# Patient Record
Sex: MALE | Race: WHITE | NOT HISPANIC OR LATINO
[De-identification: names, ages, dates, MRNs, and addresses within clinical notes are randomized per-mention and may not be internally consistent; named-entity substitution may affect disease eponyms.]

---

## 2017-02-11 ENCOUNTER — RX RENEWAL (OUTPATIENT)
Age: 67
End: 2017-02-11

## 2017-12-20 ENCOUNTER — APPOINTMENT (OUTPATIENT)
Dept: UROLOGY | Facility: CLINIC | Age: 67
End: 2017-12-20
Payer: COMMERCIAL

## 2017-12-20 VITALS
HEIGHT: 69 IN | HEART RATE: 75 BPM | TEMPERATURE: 98.4 F | DIASTOLIC BLOOD PRESSURE: 76 MMHG | SYSTOLIC BLOOD PRESSURE: 138 MMHG | OXYGEN SATURATION: 98 % | WEIGHT: 210 LBS | BODY MASS INDEX: 31.1 KG/M2

## 2017-12-20 PROCEDURE — 99213 OFFICE O/P EST LOW 20 MIN: CPT

## 2018-02-23 LAB
APPEARANCE: CLEAR
BACTERIA: NEGATIVE
BILIRUBIN URINE: NEGATIVE
BLOOD URINE: ABNORMAL
COLOR: YELLOW
CORE LAB FLUID CYTOLOGY: NORMAL
GLUCOSE QUALITATIVE U: NEGATIVE MG/DL
HYALINE CASTS: 1 /LPF
KETONES URINE: NEGATIVE
LEUKOCYTE ESTERASE URINE: NEGATIVE
MICROSCOPIC-UA: NORMAL
NITRITE URINE: NEGATIVE
PH URINE: 5.5
PROTEIN URINE: NEGATIVE MG/DL
PSA SERPL-MCNC: 0.35 NG/ML
RED BLOOD CELLS URINE: 2 /HPF
SPECIFIC GRAVITY URINE: 1.02
SQUAMOUS EPITHELIAL CELLS: 1 /HPF
UROBILINOGEN URINE: NEGATIVE MG/DL
WHITE BLOOD CELLS URINE: 0 /HPF

## 2018-06-27 ENCOUNTER — TRANSCRIPTION ENCOUNTER (OUTPATIENT)
Age: 68
End: 2018-06-27

## 2018-06-27 NOTE — ASU PATIENT PROFILE, ADULT - PMH
HTN (hypertension)    Hyperlipemia    Nephrolithiasis    OA (osteoarthritis)    CHEL on CPAP HTN (hypertension)    Hyperlipemia    Morbid obesity    Nephrolithiasis    OA (osteoarthritis)    CHEL on CPAP

## 2018-06-28 ENCOUNTER — OUTPATIENT (OUTPATIENT)
Dept: OUTPATIENT SERVICES | Facility: HOSPITAL | Age: 68
LOS: 1 days | End: 2018-06-28
Payer: COMMERCIAL

## 2018-06-28 ENCOUNTER — RESULT REVIEW (OUTPATIENT)
Age: 68
End: 2018-06-28

## 2018-06-28 VITALS
HEART RATE: 61 BPM | OXYGEN SATURATION: 94 % | DIASTOLIC BLOOD PRESSURE: 61 MMHG | SYSTOLIC BLOOD PRESSURE: 104 MMHG | RESPIRATION RATE: 12 BRPM

## 2018-06-28 VITALS
HEART RATE: 67 BPM | OXYGEN SATURATION: 96 % | SYSTOLIC BLOOD PRESSURE: 133 MMHG | RESPIRATION RATE: 12 BRPM | WEIGHT: 252.65 LBS | TEMPERATURE: 98 F | HEIGHT: 68 IN | DIASTOLIC BLOOD PRESSURE: 75 MMHG

## 2018-06-28 DIAGNOSIS — D48.7 NEOPLASM OF UNCERTAIN BEHAVIOR OF OTHER SPECIFIED SITES: ICD-10-CM

## 2018-06-28 DIAGNOSIS — Z98.89 OTHER SPECIFIED POSTPROCEDURAL STATES: Chronic | ICD-10-CM

## 2018-06-28 DIAGNOSIS — Z96.659 PRESENCE OF UNSPECIFIED ARTIFICIAL KNEE JOINT: Chronic | ICD-10-CM

## 2018-06-28 PROCEDURE — 88305 TISSUE EXAM BY PATHOLOGIST: CPT | Mod: 26

## 2018-06-28 PROCEDURE — 88305 TISSUE EXAM BY PATHOLOGIST: CPT

## 2018-06-28 PROCEDURE — 68115 EXC LES CONJUNCTIVA >1 CM: CPT | Mod: 50

## 2018-06-28 NOTE — ASU DISCHARGE PLAN (ADULT/PEDIATRIC). - SPECIAL INSTRUCTIONS
tobradex ophthalmic suspension one drop both eyes four times daily  keflex 500 mg po tid for 5 days  iced sterile compresses to lids while awake  elevate head of bed 20 degrees  report active bleeding  swelling or eye pain

## 2018-09-28 ENCOUNTER — APPOINTMENT (OUTPATIENT)
Dept: UROLOGY | Facility: CLINIC | Age: 68
End: 2018-09-28
Payer: COMMERCIAL

## 2018-09-28 VITALS
TEMPERATURE: 98.9 F | SYSTOLIC BLOOD PRESSURE: 131 MMHG | DIASTOLIC BLOOD PRESSURE: 68 MMHG | HEIGHT: 69 IN | RESPIRATION RATE: 16 BRPM | BODY MASS INDEX: 35.55 KG/M2 | WEIGHT: 240 LBS | OXYGEN SATURATION: 97 % | HEART RATE: 78 BPM

## 2018-09-28 DIAGNOSIS — N32.89 OTHER SPECIFIED DISORDERS OF BLADDER: ICD-10-CM

## 2018-09-28 PROCEDURE — 99213 OFFICE O/P EST LOW 20 MIN: CPT

## 2018-10-09 RX ORDER — VARDENAFIL HYDROCHLORIDE 20 MG/1
20 TABLET, FILM COATED ORAL
Qty: 6 | Refills: 11 | Status: DISCONTINUED | COMMUNITY
Start: 2017-02-11 | End: 2018-10-09

## 2018-10-17 ENCOUNTER — FORM ENCOUNTER (OUTPATIENT)
Age: 68
End: 2018-10-17

## 2018-10-18 ENCOUNTER — OUTPATIENT (OUTPATIENT)
Dept: OUTPATIENT SERVICES | Facility: HOSPITAL | Age: 68
LOS: 1 days | End: 2018-10-18
Payer: COMMERCIAL

## 2018-10-18 ENCOUNTER — APPOINTMENT (OUTPATIENT)
Dept: CT IMAGING | Facility: CLINIC | Age: 68
End: 2018-10-18
Payer: COMMERCIAL

## 2018-10-18 DIAGNOSIS — Z00.8 ENCOUNTER FOR OTHER GENERAL EXAMINATION: ICD-10-CM

## 2018-10-18 DIAGNOSIS — Z96.659 PRESENCE OF UNSPECIFIED ARTIFICIAL KNEE JOINT: Chronic | ICD-10-CM

## 2018-10-18 DIAGNOSIS — Z98.89 OTHER SPECIFIED POSTPROCEDURAL STATES: Chronic | ICD-10-CM

## 2018-10-18 PROCEDURE — 74178 CT ABD&PLV WO CNTR FLWD CNTR: CPT

## 2018-10-18 PROCEDURE — 82565 ASSAY OF CREATININE: CPT

## 2018-10-18 PROCEDURE — 74178 CT ABD&PLV WO CNTR FLWD CNTR: CPT | Mod: 26

## 2019-02-04 LAB
ANION GAP SERPL CALC-SCNC: 13 MMOL/L
APPEARANCE: CLEAR
BACTERIA: NEGATIVE
BILIRUBIN URINE: NEGATIVE
BLOOD URINE: NEGATIVE
BUN SERPL-MCNC: 23 MG/DL
CALCIUM SERPL-MCNC: 9.2 MG/DL
CHLORIDE SERPL-SCNC: 104 MMOL/L
CO2 SERPL-SCNC: 23 MMOL/L
COLOR: YELLOW
CREAT SERPL-MCNC: 0.94 MG/DL
GLUCOSE QUALITATIVE U: NEGATIVE MG/DL
KETONES URINE: NEGATIVE
LEUKOCYTE ESTERASE URINE: NEGATIVE
MICROSCOPIC-UA: NORMAL
NITRITE URINE: NEGATIVE
PH URINE: 5.5
POTASSIUM SERPL-SCNC: 4.2 MMOL/L
PROTEIN URINE: NEGATIVE MG/DL
PSA SERPL-MCNC: 0.32 NG/ML
RED BLOOD CELLS URINE: 2 /HPF
SODIUM SERPL-SCNC: 140 MMOL/L
SPECIFIC GRAVITY URINE: 1.03
SQUAMOUS EPITHELIAL CELLS: 0 /HPF
UROBILINOGEN URINE: NEGATIVE MG/DL
WHITE BLOOD CELLS URINE: 1 /HPF

## 2019-06-14 ENCOUNTER — MEDICATION RENEWAL (OUTPATIENT)
Age: 69
End: 2019-06-14

## 2019-09-15 ENCOUNTER — INPATIENT (INPATIENT)
Facility: HOSPITAL | Age: 69
LOS: 4 days | Discharge: HOME | DRG: 444 | End: 2019-09-20
Attending: HOSPITALIST | Admitting: HOSPITALIST
Payer: COMMERCIAL

## 2019-09-15 ENCOUNTER — EMERGENCY (EMERGENCY)
Facility: HOSPITAL | Age: 69
LOS: 0 days | Discharge: ANOTHER HOSPITAL | End: 2019-09-15
Attending: EMERGENCY MEDICINE
Payer: COMMERCIAL

## 2019-09-15 VITALS
OXYGEN SATURATION: 98 % | DIASTOLIC BLOOD PRESSURE: 61 MMHG | HEIGHT: 69 IN | RESPIRATION RATE: 18 BRPM | SYSTOLIC BLOOD PRESSURE: 122 MMHG | HEART RATE: 81 BPM | TEMPERATURE: 98 F | WEIGHT: 220.02 LBS

## 2019-09-15 VITALS
HEART RATE: 67 BPM | OXYGEN SATURATION: 98 % | TEMPERATURE: 99 F | RESPIRATION RATE: 18 BRPM | DIASTOLIC BLOOD PRESSURE: 53 MMHG | SYSTOLIC BLOOD PRESSURE: 118 MMHG

## 2019-09-15 VITALS
HEART RATE: 77 BPM | RESPIRATION RATE: 18 BRPM | WEIGHT: 220.02 LBS | OXYGEN SATURATION: 95 % | SYSTOLIC BLOOD PRESSURE: 137 MMHG | DIASTOLIC BLOOD PRESSURE: 80 MMHG | TEMPERATURE: 99 F | HEIGHT: 68 IN

## 2019-09-15 DIAGNOSIS — Z96.659 PRESENCE OF UNSPECIFIED ARTIFICIAL KNEE JOINT: Chronic | ICD-10-CM

## 2019-09-15 DIAGNOSIS — Z87.442 PERSONAL HISTORY OF URINARY CALCULI: ICD-10-CM

## 2019-09-15 DIAGNOSIS — I10 ESSENTIAL (PRIMARY) HYPERTENSION: ICD-10-CM

## 2019-09-15 DIAGNOSIS — K80.50 CALCULUS OF BILE DUCT WITHOUT CHOLANGITIS OR CHOLECYSTITIS WITHOUT OBSTRUCTION: ICD-10-CM

## 2019-09-15 DIAGNOSIS — Z79.899 OTHER LONG TERM (CURRENT) DRUG THERAPY: ICD-10-CM

## 2019-09-15 DIAGNOSIS — K80.20 CALCULUS OF GALLBLADDER WITHOUT CHOLECYSTITIS WITHOUT OBSTRUCTION: ICD-10-CM

## 2019-09-15 DIAGNOSIS — E78.5 HYPERLIPIDEMIA, UNSPECIFIED: ICD-10-CM

## 2019-09-15 DIAGNOSIS — E66.01 MORBID (SEVERE) OBESITY DUE TO EXCESS CALORIES: ICD-10-CM

## 2019-09-15 DIAGNOSIS — Z98.89 OTHER SPECIFIED POSTPROCEDURAL STATES: Chronic | ICD-10-CM

## 2019-09-15 DIAGNOSIS — R10.13 EPIGASTRIC PAIN: ICD-10-CM

## 2019-09-15 DIAGNOSIS — K80.51 CALCULUS OF BILE DUCT WITHOUT CHOLANGITIS OR CHOLECYSTITIS WITH OBSTRUCTION: ICD-10-CM

## 2019-09-15 LAB
ADD ON TEST-SPECIMEN IN LAB: SIGNIFICANT CHANGE UP
ALBUMIN SERPL ELPH-MCNC: 3.8 G/DL — SIGNIFICANT CHANGE UP (ref 3.3–5)
ALBUMIN SERPL ELPH-MCNC: 4 G/DL — SIGNIFICANT CHANGE UP (ref 3.3–5.2)
ALP SERPL-CCNC: 81 U/L — SIGNIFICANT CHANGE UP (ref 40–120)
ALP SERPL-CCNC: 85 U/L — SIGNIFICANT CHANGE UP (ref 40–120)
ALT FLD-CCNC: 37 U/L — SIGNIFICANT CHANGE UP (ref 12–78)
ALT FLD-CCNC: 79 U/L — HIGH
ANION GAP SERPL CALC-SCNC: 11 MMOL/L — SIGNIFICANT CHANGE UP (ref 5–17)
ANION GAP SERPL CALC-SCNC: 17 MMOL/L — SIGNIFICANT CHANGE UP (ref 5–17)
ANISOCYTOSIS BLD QL: SLIGHT — SIGNIFICANT CHANGE UP
APPEARANCE UR: CLEAR — SIGNIFICANT CHANGE UP
APTT BLD: 28.4 SEC — SIGNIFICANT CHANGE UP (ref 27.5–36.3)
APTT BLD: 29.3 SEC — SIGNIFICANT CHANGE UP (ref 27.5–36.3)
AST SERPL-CCNC: 124 U/L — HIGH
AST SERPL-CCNC: 27 U/L — SIGNIFICANT CHANGE UP (ref 15–37)
BASOPHILS # BLD AUTO: 0 K/UL — SIGNIFICANT CHANGE UP (ref 0–0.2)
BASOPHILS NFR BLD AUTO: 0 % — SIGNIFICANT CHANGE UP (ref 0–2)
BILIRUB SERPL-MCNC: 1.3 MG/DL — HIGH (ref 0.2–1.2)
BILIRUB SERPL-MCNC: 2.1 MG/DL — HIGH (ref 0.4–2)
BILIRUB UR-MCNC: NEGATIVE — SIGNIFICANT CHANGE UP
BLD GP AB SCN SERPL QL: SIGNIFICANT CHANGE UP
BUN SERPL-MCNC: 17 MG/DL — SIGNIFICANT CHANGE UP (ref 8–20)
BUN SERPL-MCNC: 18 MG/DL — SIGNIFICANT CHANGE UP (ref 7–23)
CALCIUM SERPL-MCNC: 8.8 MG/DL — SIGNIFICANT CHANGE UP (ref 8.6–10.2)
CALCIUM SERPL-MCNC: 8.9 MG/DL — SIGNIFICANT CHANGE UP (ref 8.5–10.1)
CHLORIDE SERPL-SCNC: 100 MMOL/L — SIGNIFICANT CHANGE UP (ref 98–107)
CHLORIDE SERPL-SCNC: 105 MMOL/L — SIGNIFICANT CHANGE UP (ref 96–108)
CO2 SERPL-SCNC: 23 MMOL/L — SIGNIFICANT CHANGE UP (ref 22–29)
CO2 SERPL-SCNC: 24 MMOL/L — SIGNIFICANT CHANGE UP (ref 22–31)
COLOR SPEC: YELLOW — SIGNIFICANT CHANGE UP
CREAT SERPL-MCNC: 0.9 MG/DL — SIGNIFICANT CHANGE UP (ref 0.5–1.3)
CREAT SERPL-MCNC: 1.11 MG/DL — SIGNIFICANT CHANGE UP (ref 0.5–1.3)
DIFF PNL FLD: ABNORMAL
EOSINOPHIL # BLD AUTO: 0 K/UL — SIGNIFICANT CHANGE UP (ref 0–0.5)
EOSINOPHIL NFR BLD AUTO: 0 % — SIGNIFICANT CHANGE UP (ref 0–6)
GIANT PLATELETS BLD QL SMEAR: PRESENT — SIGNIFICANT CHANGE UP
GLUCOSE SERPL-MCNC: 135 MG/DL — HIGH (ref 70–115)
GLUCOSE SERPL-MCNC: 171 MG/DL — HIGH (ref 70–99)
GLUCOSE UR QL: NEGATIVE MG/DL — SIGNIFICANT CHANGE UP
HCT VFR BLD CALC: 41.2 % — SIGNIFICANT CHANGE UP (ref 39–50)
HCT VFR BLD CALC: 42 % — SIGNIFICANT CHANGE UP (ref 39–50)
HGB BLD-MCNC: 13.8 G/DL — SIGNIFICANT CHANGE UP (ref 13–17)
HGB BLD-MCNC: 14.4 G/DL — SIGNIFICANT CHANGE UP (ref 13–17)
HYPOCHROMIA BLD QL: SLIGHT — SIGNIFICANT CHANGE UP
INR BLD: 0.92 RATIO — SIGNIFICANT CHANGE UP (ref 0.88–1.16)
INR BLD: 1.07 RATIO — SIGNIFICANT CHANGE UP (ref 0.88–1.16)
KETONES UR-MCNC: ABNORMAL
LEUKOCYTE ESTERASE UR-ACNC: NEGATIVE — SIGNIFICANT CHANGE UP
LIDOCAIN IGE QN: 236 U/L — HIGH (ref 22–51)
LYMPHOCYTES # BLD AUTO: 1.29 K/UL — SIGNIFICANT CHANGE UP (ref 1–3.3)
LYMPHOCYTES # BLD AUTO: 7.8 % — LOW (ref 13–44)
MACROCYTES BLD QL: SLIGHT — SIGNIFICANT CHANGE UP
MAGNESIUM SERPL-MCNC: 2.1 MG/DL — SIGNIFICANT CHANGE UP (ref 1.6–2.6)
MANUAL SMEAR VERIFICATION: SIGNIFICANT CHANGE UP
MCHC RBC-ENTMCNC: 30.9 PG — SIGNIFICANT CHANGE UP (ref 27–34)
MCHC RBC-ENTMCNC: 31.3 PG — SIGNIFICANT CHANGE UP (ref 27–34)
MCHC RBC-ENTMCNC: 33.5 GM/DL — SIGNIFICANT CHANGE UP (ref 32–36)
MCHC RBC-ENTMCNC: 34.3 GM/DL — SIGNIFICANT CHANGE UP (ref 32–36)
MCV RBC AUTO: 91.3 FL — SIGNIFICANT CHANGE UP (ref 80–100)
MCV RBC AUTO: 92.4 FL — SIGNIFICANT CHANGE UP (ref 80–100)
MONOCYTES # BLD AUTO: 1.44 K/UL — HIGH (ref 0–0.9)
MONOCYTES NFR BLD AUTO: 8.7 % — SIGNIFICANT CHANGE UP (ref 2–14)
NEUTROPHILS # BLD AUTO: 13.79 K/UL — HIGH (ref 1.8–7.4)
NEUTROPHILS NFR BLD AUTO: 78.3 % — HIGH (ref 43–77)
NEUTS BAND # BLD: 5.2 % — SIGNIFICANT CHANGE UP (ref 0–8)
NITRITE UR-MCNC: NEGATIVE — SIGNIFICANT CHANGE UP
PH UR: 5 — SIGNIFICANT CHANGE UP (ref 5–8)
PLAT MORPH BLD: NORMAL — SIGNIFICANT CHANGE UP
PLATELET # BLD AUTO: 197 K/UL — SIGNIFICANT CHANGE UP (ref 150–400)
PLATELET # BLD AUTO: 210 K/UL — SIGNIFICANT CHANGE UP (ref 150–400)
POIKILOCYTOSIS BLD QL AUTO: SLIGHT — SIGNIFICANT CHANGE UP
POTASSIUM SERPL-MCNC: 3.9 MMOL/L — SIGNIFICANT CHANGE UP (ref 3.5–5.3)
POTASSIUM SERPL-MCNC: 4.3 MMOL/L — SIGNIFICANT CHANGE UP (ref 3.5–5.3)
POTASSIUM SERPL-SCNC: 3.9 MMOL/L — SIGNIFICANT CHANGE UP (ref 3.5–5.3)
POTASSIUM SERPL-SCNC: 4.3 MMOL/L — SIGNIFICANT CHANGE UP (ref 3.5–5.3)
PROT SERPL-MCNC: 6.8 G/DL — SIGNIFICANT CHANGE UP (ref 6.6–8.7)
PROT SERPL-MCNC: 7.2 GM/DL — SIGNIFICANT CHANGE UP (ref 6–8.3)
PROT UR-MCNC: NEGATIVE MG/DL — SIGNIFICANT CHANGE UP
PROTHROM AB SERPL-ACNC: 10.2 SEC — SIGNIFICANT CHANGE UP (ref 10–12.9)
PROTHROM AB SERPL-ACNC: 12.3 SEC — SIGNIFICANT CHANGE UP (ref 10–12.9)
RBC # BLD: 4.46 M/UL — SIGNIFICANT CHANGE UP (ref 4.2–5.8)
RBC # BLD: 4.6 M/UL — SIGNIFICANT CHANGE UP (ref 4.2–5.8)
RBC # FLD: 14.1 % — SIGNIFICANT CHANGE UP (ref 10.3–14.5)
RBC # FLD: 14.2 % — SIGNIFICANT CHANGE UP (ref 10.3–14.5)
RBC BLD AUTO: ABNORMAL
SCHISTOCYTES BLD QL AUTO: SLIGHT — SIGNIFICANT CHANGE UP
SODIUM SERPL-SCNC: 140 MMOL/L — SIGNIFICANT CHANGE UP (ref 135–145)
SODIUM SERPL-SCNC: 140 MMOL/L — SIGNIFICANT CHANGE UP (ref 135–145)
SP GR SPEC: 1.02 — SIGNIFICANT CHANGE UP (ref 1.01–1.02)
TROPONIN I SERPL-MCNC: <0.015 NG/ML — SIGNIFICANT CHANGE UP (ref 0.01–0.04)
TROPONIN I SERPL-MCNC: <0.015 NG/ML — SIGNIFICANT CHANGE UP (ref 0.01–0.04)
UROBILINOGEN FLD QL: NEGATIVE MG/DL — SIGNIFICANT CHANGE UP
WBC # BLD: 14.55 K/UL — HIGH (ref 3.8–10.5)
WBC # BLD: 16.52 K/UL — HIGH (ref 3.8–10.5)
WBC # FLD AUTO: 14.55 K/UL — HIGH (ref 3.8–10.5)
WBC # FLD AUTO: 16.52 K/UL — HIGH (ref 3.8–10.5)

## 2019-09-15 PROCEDURE — 84484 ASSAY OF TROPONIN QUANT: CPT

## 2019-09-15 PROCEDURE — 99223 1ST HOSP IP/OBS HIGH 75: CPT

## 2019-09-15 PROCEDURE — 87086 URINE CULTURE/COLONY COUNT: CPT

## 2019-09-15 PROCEDURE — 99285 EMERGENCY DEPT VISIT HI MDM: CPT | Mod: 25

## 2019-09-15 PROCEDURE — 85610 PROTHROMBIN TIME: CPT

## 2019-09-15 PROCEDURE — 80053 COMPREHEN METABOLIC PANEL: CPT

## 2019-09-15 PROCEDURE — 96361 HYDRATE IV INFUSION ADD-ON: CPT

## 2019-09-15 PROCEDURE — 93010 ELECTROCARDIOGRAM REPORT: CPT

## 2019-09-15 PROCEDURE — 36415 COLL VENOUS BLD VENIPUNCTURE: CPT

## 2019-09-15 PROCEDURE — 96376 TX/PRO/DX INJ SAME DRUG ADON: CPT

## 2019-09-15 PROCEDURE — 93005 ELECTROCARDIOGRAM TRACING: CPT

## 2019-09-15 PROCEDURE — 71045 X-RAY EXAM CHEST 1 VIEW: CPT

## 2019-09-15 PROCEDURE — 76705 ECHO EXAM OF ABDOMEN: CPT

## 2019-09-15 PROCEDURE — 71045 X-RAY EXAM CHEST 1 VIEW: CPT | Mod: 26

## 2019-09-15 PROCEDURE — 99285 EMERGENCY DEPT VISIT HI MDM: CPT

## 2019-09-15 PROCEDURE — 87040 BLOOD CULTURE FOR BACTERIA: CPT

## 2019-09-15 PROCEDURE — 96375 TX/PRO/DX INJ NEW DRUG ADDON: CPT

## 2019-09-15 PROCEDURE — 96365 THER/PROPH/DIAG IV INF INIT: CPT

## 2019-09-15 PROCEDURE — 81001 URINALYSIS AUTO W/SCOPE: CPT

## 2019-09-15 PROCEDURE — 85730 THROMBOPLASTIN TIME PARTIAL: CPT

## 2019-09-15 PROCEDURE — 76705 ECHO EXAM OF ABDOMEN: CPT | Mod: 26

## 2019-09-15 PROCEDURE — 93010 ELECTROCARDIOGRAM REPORT: CPT | Mod: 77

## 2019-09-15 PROCEDURE — 83735 ASSAY OF MAGNESIUM: CPT

## 2019-09-15 PROCEDURE — 74176 CT ABD & PELVIS W/O CONTRAST: CPT | Mod: 26

## 2019-09-15 PROCEDURE — 85027 COMPLETE CBC AUTOMATED: CPT

## 2019-09-15 PROCEDURE — 74176 CT ABD & PELVIS W/O CONTRAST: CPT

## 2019-09-15 PROCEDURE — 83690 ASSAY OF LIPASE: CPT

## 2019-09-15 RX ORDER — ENOXAPARIN SODIUM 100 MG/ML
40 INJECTION SUBCUTANEOUS ONCE
Refills: 0 | Status: COMPLETED | OUTPATIENT
Start: 2019-09-15 | End: 2019-09-15

## 2019-09-15 RX ORDER — SODIUM CHLORIDE 9 MG/ML
1000 INJECTION, SOLUTION INTRAVENOUS
Refills: 0 | Status: DISCONTINUED | OUTPATIENT
Start: 2019-09-15 | End: 2019-09-16

## 2019-09-15 RX ORDER — MORPHINE SULFATE 50 MG/1
2 CAPSULE, EXTENDED RELEASE ORAL EVERY 4 HOURS
Refills: 0 | Status: DISCONTINUED | OUTPATIENT
Start: 2019-09-15 | End: 2019-09-19

## 2019-09-15 RX ORDER — ONDANSETRON 8 MG/1
4 TABLET, FILM COATED ORAL ONCE
Refills: 0 | Status: COMPLETED | OUTPATIENT
Start: 2019-09-15 | End: 2019-09-15

## 2019-09-15 RX ORDER — SODIUM CHLORIDE 9 MG/ML
500 INJECTION INTRAMUSCULAR; INTRAVENOUS; SUBCUTANEOUS ONCE
Refills: 0 | Status: COMPLETED | OUTPATIENT
Start: 2019-09-15 | End: 2019-09-15

## 2019-09-15 RX ORDER — FAMOTIDINE 10 MG/ML
20 INJECTION INTRAVENOUS ONCE
Refills: 0 | Status: COMPLETED | OUTPATIENT
Start: 2019-09-15 | End: 2019-09-15

## 2019-09-15 RX ORDER — MORPHINE SULFATE 50 MG/1
4 CAPSULE, EXTENDED RELEASE ORAL ONCE
Refills: 0 | Status: DISCONTINUED | OUTPATIENT
Start: 2019-09-15 | End: 2019-09-15

## 2019-09-15 RX ORDER — PANTOPRAZOLE SODIUM 20 MG/1
40 TABLET, DELAYED RELEASE ORAL
Refills: 0 | Status: DISCONTINUED | OUTPATIENT
Start: 2019-09-15 | End: 2019-09-19

## 2019-09-15 RX ORDER — METOCLOPRAMIDE HCL 10 MG
10 TABLET ORAL ONCE
Refills: 0 | Status: COMPLETED | OUTPATIENT
Start: 2019-09-15 | End: 2019-09-15

## 2019-09-15 RX ORDER — ONDANSETRON 8 MG/1
4 TABLET, FILM COATED ORAL EVERY 6 HOURS
Refills: 0 | Status: DISCONTINUED | OUTPATIENT
Start: 2019-09-15 | End: 2019-09-19

## 2019-09-15 RX ORDER — ONDANSETRON 8 MG/1
8 TABLET, FILM COATED ORAL ONCE
Refills: 0 | Status: COMPLETED | OUTPATIENT
Start: 2019-09-15 | End: 2019-09-15

## 2019-09-15 RX ORDER — MORPHINE SULFATE 50 MG/1
6 CAPSULE, EXTENDED RELEASE ORAL ONCE
Refills: 0 | Status: DISCONTINUED | OUTPATIENT
Start: 2019-09-15 | End: 2019-09-15

## 2019-09-15 RX ORDER — PIPERACILLIN AND TAZOBACTAM 4; .5 G/20ML; G/20ML
3.38 INJECTION, POWDER, LYOPHILIZED, FOR SOLUTION INTRAVENOUS ONCE
Refills: 0 | Status: COMPLETED | OUTPATIENT
Start: 2019-09-15 | End: 2019-09-15

## 2019-09-15 RX ADMIN — PIPERACILLIN AND TAZOBACTAM 200 GRAM(S): 4; .5 INJECTION, POWDER, LYOPHILIZED, FOR SOLUTION INTRAVENOUS at 11:20

## 2019-09-15 RX ADMIN — MORPHINE SULFATE 2 MILLIGRAM(S): 50 CAPSULE, EXTENDED RELEASE ORAL at 17:29

## 2019-09-15 RX ADMIN — ONDANSETRON 8 MILLIGRAM(S): 8 TABLET, FILM COATED ORAL at 13:39

## 2019-09-15 RX ADMIN — MORPHINE SULFATE 6 MILLIGRAM(S): 50 CAPSULE, EXTENDED RELEASE ORAL at 09:46

## 2019-09-15 RX ADMIN — ENOXAPARIN SODIUM 40 MILLIGRAM(S): 100 INJECTION SUBCUTANEOUS at 16:53

## 2019-09-15 RX ADMIN — SODIUM CHLORIDE 500 MILLILITER(S): 9 INJECTION INTRAMUSCULAR; INTRAVENOUS; SUBCUTANEOUS at 10:40

## 2019-09-15 RX ADMIN — SODIUM CHLORIDE 125 MILLILITER(S): 9 INJECTION, SOLUTION INTRAVENOUS at 16:54

## 2019-09-15 RX ADMIN — FAMOTIDINE 20 MILLIGRAM(S): 10 INJECTION INTRAVENOUS at 05:00

## 2019-09-15 RX ADMIN — ONDANSETRON 4 MILLIGRAM(S): 8 TABLET, FILM COATED ORAL at 17:29

## 2019-09-15 RX ADMIN — MORPHINE SULFATE 4 MILLIGRAM(S): 50 CAPSULE, EXTENDED RELEASE ORAL at 13:55

## 2019-09-15 RX ADMIN — ONDANSETRON 4 MILLIGRAM(S): 8 TABLET, FILM COATED ORAL at 09:46

## 2019-09-15 RX ADMIN — SODIUM CHLORIDE 500 MILLILITER(S): 9 INJECTION INTRAMUSCULAR; INTRAVENOUS; SUBCUTANEOUS at 11:20

## 2019-09-15 RX ADMIN — PIPERACILLIN AND TAZOBACTAM 3.38 GRAM(S): 4; .5 INJECTION, POWDER, LYOPHILIZED, FOR SOLUTION INTRAVENOUS at 12:02

## 2019-09-15 RX ADMIN — MORPHINE SULFATE 2 MILLIGRAM(S): 50 CAPSULE, EXTENDED RELEASE ORAL at 22:30

## 2019-09-15 RX ADMIN — ONDANSETRON 4 MILLIGRAM(S): 8 TABLET, FILM COATED ORAL at 05:00

## 2019-09-15 RX ADMIN — MORPHINE SULFATE 2 MILLIGRAM(S): 50 CAPSULE, EXTENDED RELEASE ORAL at 22:00

## 2019-09-15 RX ADMIN — MORPHINE SULFATE 6 MILLIGRAM(S): 50 CAPSULE, EXTENDED RELEASE ORAL at 10:01

## 2019-09-15 RX ADMIN — MORPHINE SULFATE 4 MILLIGRAM(S): 50 CAPSULE, EXTENDED RELEASE ORAL at 13:40

## 2019-09-15 RX ADMIN — Medication 10 MILLIGRAM(S): at 06:46

## 2019-09-15 NOTE — H&P ADULT - NSICDXPASTMEDICALHX_GEN_ALL_CORE_FT
PAST MEDICAL HISTORY:  HTN (hypertension)     Hyperlipemia     Morbid obesity     Nephrolithiasis     OA (osteoarthritis)     CHEL on CPAP

## 2019-09-15 NOTE — H&P ADULT - NSHPLABSRESULTS_GEN_ALL_CORE
13.8   16.52 )-----------( 197      ( 15 Sep 2019 14:14 )             41.2   09-15    140  |  100  |  17.0  ----------------------------<  135<H>  4.3   |  23.0  |  0.90    Ca    8.8      15 Sep 2019 14:14  Mg     2.1     09-15    TPro  6.8  /  Alb  4.0  /  TBili  2.1<H>  /  DBili  x   /  AST  124<H>  /  ALT  79<H>  /  AlkPhos  85  09-15      < from: CT Abdomen and Pelvis No Cont (09.15.19 @ 06:13) >      Cholelithiasis.    < end of copied text >

## 2019-09-15 NOTE — ED ADULT NURSE NOTE - OBJECTIVE STATEMENT
Pt was sent to the ED from [facility], [place] for ERCP, c/o abd pain 7/10

## 2019-09-15 NOTE — CONSULT NOTE ADULT - ASSESSMENT
67yo male transferred from OSH for evaluation of possible choledocholithiasis. Surgery consulted for possible lap cholecystectomy this admission following biliary clearance.     - agree w/ GI recs for MRCP.   - Surgery to continue to follow.

## 2019-09-15 NOTE — ED PROVIDER NOTE - CLINICAL SUMMARY MEDICAL DECISION MAKING FREE TEXT BOX
Pt p/w diffuse abdominal pain, vomiting and TTP.  DDx includes appendicitis vs cholecystitis vs ureterolithiasis vs SBO.  Plan for IV fluids, pain control, labs and CTAP

## 2019-09-15 NOTE — H&P ADULT - NSHPREVIEWOFSYSTEMS_GEN_ALL_CORE
69 yo male obese with h/o HTN tx ed from [facility]  for evaluation of possible choledocholithiasis. He is with epigastric pain started last night associated with nausea and vomiting . Pt had epigastric pain radiating to right upper quadrant and back severe , had went to  last night , CT of abd showed gallstones , worsening LFT's with suspected CBD stone . Pt is with epigastric pain 5/10 at present .

## 2019-09-15 NOTE — ED PROVIDER NOTE - OBJECTIVE STATEMENT
67 y/o M with h/o HTN, HLD, obesity, and nephrolithiasis p/w severe sudden onset of midepigastric abdominal pain radiating to the back and chest associated with nausea and dry heaving that began about 2 hours PTA. Pain is severe but he is unable to describe it.

## 2019-09-15 NOTE — ED ADULT TRIAGE NOTE - BMI (KG/M2)
Chart reviewed. Awating PFTS, pulmonary consult awaiting staffing. Myasthenic antibodies not drawn yet. Will continue to follow.    Elvia Bukrs MD FAAN  Department of Neurology  Pager [phone]         33.5

## 2019-09-15 NOTE — H&P ADULT - NSICDXFAMILYHX_GEN_ALL_CORE_FT
FAMILY HISTORY:  Family history of gallstones, mother   she  at age last 80's  FH: heart disease, dad  at age high 70's

## 2019-09-15 NOTE — ED ADULT NURSE NOTE - PMH
HTN (hypertension)    Hyperlipemia    Morbid obesity    Nephrolithiasis    OA (osteoarthritis)    CHEL on CPAP

## 2019-09-15 NOTE — H&P ADULT - NSICDXPASTSURGICALHX_GEN_ALL_CORE_FT
PAST SURGICAL HISTORY:  S/P arthroscopic knee surgery     S/P TKR (total knee replacement) bilateral

## 2019-09-15 NOTE — H&P ADULT - ASSESSMENT
69 yo male obese with HTN , nephrolithiasis , tx ed from  for suspected choledocholithiases , he has been having abd pain nausea since last night with vomiting per GI note reported US in the ED at [place]  showed CBD stone ? and gallstones   He is with leukocytosis , elevated LFT's     1- Abd pain / elevated LFT's cholethiasis   r/o CBD stone   MRCP   keep NPO , iv fluid   monitor wbc     2- HTN - resume home meds     3- CHEL on CIPAP

## 2019-09-15 NOTE — ED PROVIDER NOTE - CLINICAL SUMMARY MEDICAL DECISION MAKING FREE TEXT BOX
Pt with symptomatic cholelithiasis and likely choledocolithiasis, will admit for mrcp and likely ercp

## 2019-09-15 NOTE — ED PROVIDER NOTE - OBJECTIVE STATEMENT
68 year old male with PMh HTN, HLD, OA presents as transfer form  fro choledocolithiasis. pt states that yesterday he began to have a severe, constant, non-radiating epigastric abd pain. Associated nausea and vomiting, He went to , where he was found to choledocolithiasis and was transferred to [facility] for ERCP.

## 2019-09-15 NOTE — ED ADULT NURSE NOTE - NSIMPLEMENTINTERV_GEN_ALL_ED
Implemented All Fall Risk Interventions:  [place] to call system. Call bell, personal items and telephone within reach. Instruct patient to call for assistance. Room bathroom lighting operational. Non-slip footwear when patient is off stretcher. Physically safe environment: no spills, clutter or unnecessary equipment. Stretcher in lowest position, wheels locked, appropriate side rails in place. Provide visual cue, wrist band, yellow gown, etc. Monitor gait and stability. Monitor for mental status changes and reorient to person, place, and time. Review medications for side effects contributing to fall risk. Reinforce activity limits and safety measures with patient and family.

## 2019-09-16 DIAGNOSIS — K80.50 CALCULUS OF BILE DUCT WITHOUT CHOLANGITIS OR CHOLECYSTITIS WITHOUT OBSTRUCTION: ICD-10-CM

## 2019-09-16 LAB
ALBUMIN SERPL ELPH-MCNC: 3.5 G/DL — SIGNIFICANT CHANGE UP (ref 3.3–5.2)
ALBUMIN SERPL ELPH-MCNC: 3.5 G/DL — SIGNIFICANT CHANGE UP (ref 3.3–5.2)
ALP SERPL-CCNC: 70 U/L — SIGNIFICANT CHANGE UP (ref 40–120)
ALP SERPL-CCNC: 70 U/L — SIGNIFICANT CHANGE UP (ref 40–120)
ALT FLD-CCNC: 62 U/L — HIGH
ALT FLD-CCNC: 62 U/L — HIGH
ANION GAP SERPL CALC-SCNC: 14 MMOL/L — SIGNIFICANT CHANGE UP (ref 5–17)
ANION GAP SERPL CALC-SCNC: 14 MMOL/L — SIGNIFICANT CHANGE UP (ref 5–17)
AST SERPL-CCNC: 74 U/L — HIGH
AST SERPL-CCNC: 74 U/L — HIGH
BASOPHILS # BLD AUTO: 0.04 K/UL — SIGNIFICANT CHANGE UP (ref 0–0.2)
BASOPHILS NFR BLD AUTO: 0.2 % — SIGNIFICANT CHANGE UP (ref 0–2)
BILIRUB DIRECT SERPL-MCNC: 0.3 MG/DL — SIGNIFICANT CHANGE UP (ref 0–0.3)
BILIRUB INDIRECT FLD-MCNC: 3.1 MG/DL — HIGH (ref 0.2–1)
BILIRUB SERPL-MCNC: 3.4 MG/DL — HIGH (ref 0.4–2)
BILIRUB SERPL-MCNC: 3.4 MG/DL — HIGH (ref 0.4–2)
BUN SERPL-MCNC: 15 MG/DL — SIGNIFICANT CHANGE UP (ref 8–20)
BUN SERPL-MCNC: 15 MG/DL — SIGNIFICANT CHANGE UP (ref 8–20)
CALCIUM SERPL-MCNC: 8.7 MG/DL — SIGNIFICANT CHANGE UP (ref 8.6–10.2)
CALCIUM SERPL-MCNC: 8.7 MG/DL — SIGNIFICANT CHANGE UP (ref 8.6–10.2)
CHLORIDE SERPL-SCNC: 100 MMOL/L — SIGNIFICANT CHANGE UP (ref 98–107)
CHLORIDE SERPL-SCNC: 100 MMOL/L — SIGNIFICANT CHANGE UP (ref 98–107)
CO2 SERPL-SCNC: 24 MMOL/L — SIGNIFICANT CHANGE UP (ref 22–29)
CO2 SERPL-SCNC: 24 MMOL/L — SIGNIFICANT CHANGE UP (ref 22–29)
CREAT SERPL-MCNC: 0.9 MG/DL — SIGNIFICANT CHANGE UP (ref 0.5–1.3)
CREAT SERPL-MCNC: 0.9 MG/DL — SIGNIFICANT CHANGE UP (ref 0.5–1.3)
CULTURE RESULTS: NO GROWTH — SIGNIFICANT CHANGE UP
EOSINOPHIL # BLD AUTO: 0.01 K/UL — SIGNIFICANT CHANGE UP (ref 0–0.5)
EOSINOPHIL NFR BLD AUTO: 0.1 % — SIGNIFICANT CHANGE UP (ref 0–6)
GLUCOSE SERPL-MCNC: 116 MG/DL — HIGH (ref 70–115)
GLUCOSE SERPL-MCNC: 116 MG/DL — HIGH (ref 70–115)
HAV IGM SER-ACNC: SIGNIFICANT CHANGE UP
HBV CORE IGM SER-ACNC: SIGNIFICANT CHANGE UP
HBV SURFACE AG SER-ACNC: SIGNIFICANT CHANGE UP
HCT VFR BLD CALC: 39.6 % — SIGNIFICANT CHANGE UP (ref 39–50)
HCV AB S/CO SERPL IA: 0.06 S/CO — SIGNIFICANT CHANGE UP (ref 0–0.99)
HCV AB SERPL-IMP: SIGNIFICANT CHANGE UP
HGB BLD-MCNC: 13.3 G/DL — SIGNIFICANT CHANGE UP (ref 13–17)
IMM GRANULOCYTES NFR BLD AUTO: 0.8 % — SIGNIFICANT CHANGE UP (ref 0–1.5)
LACTATE BLDV-MCNC: 1.9 MMOL/L — SIGNIFICANT CHANGE UP (ref 0.5–2)
LIDOCAIN IGE QN: 27 U/L — SIGNIFICANT CHANGE UP (ref 22–51)
LYMPHOCYTES # BLD AUTO: 1.27 K/UL — SIGNIFICANT CHANGE UP (ref 1–3.3)
LYMPHOCYTES # BLD AUTO: 7.7 % — LOW (ref 13–44)
MCHC RBC-ENTMCNC: 30.8 PG — SIGNIFICANT CHANGE UP (ref 27–34)
MCHC RBC-ENTMCNC: 33.6 GM/DL — SIGNIFICANT CHANGE UP (ref 32–36)
MCV RBC AUTO: 91.7 FL — SIGNIFICANT CHANGE UP (ref 80–100)
MONOCYTES # BLD AUTO: 1.54 K/UL — HIGH (ref 0–0.9)
MONOCYTES NFR BLD AUTO: 9.4 % — SIGNIFICANT CHANGE UP (ref 2–14)
NEUTROPHILS # BLD AUTO: 13.42 K/UL — HIGH (ref 1.8–7.4)
NEUTROPHILS NFR BLD AUTO: 81.8 % — HIGH (ref 43–77)
PHOSPHATE SERPL-MCNC: 1.7 MG/DL — LOW (ref 2.4–4.7)
PLATELET # BLD AUTO: 171 K/UL — SIGNIFICANT CHANGE UP (ref 150–400)
POTASSIUM SERPL-MCNC: 3.7 MMOL/L — SIGNIFICANT CHANGE UP (ref 3.5–5.3)
POTASSIUM SERPL-MCNC: 3.7 MMOL/L — SIGNIFICANT CHANGE UP (ref 3.5–5.3)
POTASSIUM SERPL-SCNC: 3.7 MMOL/L — SIGNIFICANT CHANGE UP (ref 3.5–5.3)
POTASSIUM SERPL-SCNC: 3.7 MMOL/L — SIGNIFICANT CHANGE UP (ref 3.5–5.3)
PROT SERPL-MCNC: 6.4 G/DL — LOW (ref 6.6–8.7)
PROT SERPL-MCNC: 6.4 G/DL — LOW (ref 6.6–8.7)
RBC # BLD: 4.32 M/UL — SIGNIFICANT CHANGE UP (ref 4.2–5.8)
RBC # FLD: 14.5 % — SIGNIFICANT CHANGE UP (ref 10.3–14.5)
SODIUM SERPL-SCNC: 138 MMOL/L — SIGNIFICANT CHANGE UP (ref 135–145)
SODIUM SERPL-SCNC: 138 MMOL/L — SIGNIFICANT CHANGE UP (ref 135–145)
SPECIMEN SOURCE: SIGNIFICANT CHANGE UP
WBC # BLD: 16.33 K/UL — HIGH (ref 3.8–10.5)
WBC # FLD AUTO: 16.33 K/UL — HIGH (ref 3.8–10.5)

## 2019-09-16 PROCEDURE — 74181 MRI ABDOMEN W/O CONTRAST: CPT | Mod: 26

## 2019-09-16 PROCEDURE — 99233 SBSQ HOSP IP/OBS HIGH 50: CPT | Mod: 25

## 2019-09-16 PROCEDURE — 71045 X-RAY EXAM CHEST 1 VIEW: CPT | Mod: 26

## 2019-09-16 PROCEDURE — 99232 SBSQ HOSP IP/OBS MODERATE 35: CPT

## 2019-09-16 PROCEDURE — 99233 SBSQ HOSP IP/OBS HIGH 50: CPT

## 2019-09-16 RX ORDER — SODIUM CHLORIDE 9 MG/ML
1000 INJECTION, SOLUTION INTRAVENOUS
Refills: 0 | Status: DISCONTINUED | OUTPATIENT
Start: 2019-09-16 | End: 2019-09-18

## 2019-09-16 RX ORDER — ACETAMINOPHEN 500 MG
650 TABLET ORAL EVERY 6 HOURS
Refills: 0 | Status: DISCONTINUED | OUTPATIENT
Start: 2019-09-16 | End: 2019-09-19

## 2019-09-16 RX ORDER — METOPROLOL TARTRATE 50 MG
50 TABLET ORAL DAILY
Refills: 0 | Status: DISCONTINUED | OUTPATIENT
Start: 2019-09-16 | End: 2019-09-19

## 2019-09-16 RX ORDER — PIPERACILLIN AND TAZOBACTAM 4; .5 G/20ML; G/20ML
3.38 INJECTION, POWDER, LYOPHILIZED, FOR SOLUTION INTRAVENOUS ONCE
Refills: 0 | Status: COMPLETED | OUTPATIENT
Start: 2019-09-16 | End: 2019-09-16

## 2019-09-16 RX ORDER — HYDROCHLOROTHIAZIDE 25 MG
25 TABLET ORAL DAILY
Refills: 0 | Status: DISCONTINUED | OUTPATIENT
Start: 2019-09-16 | End: 2019-09-19

## 2019-09-16 RX ORDER — PIPERACILLIN AND TAZOBACTAM 4; .5 G/20ML; G/20ML
3.38 INJECTION, POWDER, LYOPHILIZED, FOR SOLUTION INTRAVENOUS EVERY 8 HOURS
Refills: 0 | Status: COMPLETED | OUTPATIENT
Start: 2019-09-16 | End: 2019-09-19

## 2019-09-16 RX ORDER — INDOMETHACIN 50 MG
100 CAPSULE ORAL ONCE
Refills: 0 | Status: DISCONTINUED | OUTPATIENT
Start: 2019-09-16 | End: 2019-09-19

## 2019-09-16 RX ORDER — ALLOPURINOL 300 MG
100 TABLET ORAL DAILY
Refills: 0 | Status: DISCONTINUED | OUTPATIENT
Start: 2019-09-16 | End: 2019-09-19

## 2019-09-16 RX ORDER — ATORVASTATIN CALCIUM 80 MG/1
10 TABLET, FILM COATED ORAL AT BEDTIME
Refills: 0 | Status: DISCONTINUED | OUTPATIENT
Start: 2019-09-16 | End: 2019-09-19

## 2019-09-16 RX ADMIN — Medication 100 MILLIGRAM(S): at 17:37

## 2019-09-16 RX ADMIN — SODIUM CHLORIDE 150 MILLILITER(S): 9 INJECTION, SOLUTION INTRAVENOUS at 16:10

## 2019-09-16 RX ADMIN — PIPERACILLIN AND TAZOBACTAM 25 GRAM(S): 4; .5 INJECTION, POWDER, LYOPHILIZED, FOR SOLUTION INTRAVENOUS at 17:37

## 2019-09-16 RX ADMIN — SODIUM CHLORIDE 125 MILLILITER(S): 9 INJECTION, SOLUTION INTRAVENOUS at 00:53

## 2019-09-16 RX ADMIN — PIPERACILLIN AND TAZOBACTAM 25 GRAM(S): 4; .5 INJECTION, POWDER, LYOPHILIZED, FOR SOLUTION INTRAVENOUS at 10:08

## 2019-09-16 RX ADMIN — PIPERACILLIN AND TAZOBACTAM 200 GRAM(S): 4; .5 INJECTION, POWDER, LYOPHILIZED, FOR SOLUTION INTRAVENOUS at 02:30

## 2019-09-16 RX ADMIN — Medication 650 MILLIGRAM(S): at 00:50

## 2019-09-16 RX ADMIN — ATORVASTATIN CALCIUM 10 MILLIGRAM(S): 80 TABLET, FILM COATED ORAL at 22:09

## 2019-09-16 RX ADMIN — Medication 650 MILLIGRAM(S): at 16:38

## 2019-09-16 RX ADMIN — Medication 25 MILLIGRAM(S): at 17:37

## 2019-09-16 RX ADMIN — Medication 50 MILLIGRAM(S): at 17:36

## 2019-09-16 RX ADMIN — Medication 650 MILLIGRAM(S): at 01:58

## 2019-09-16 RX ADMIN — SODIUM CHLORIDE 125 MILLILITER(S): 9 INJECTION, SOLUTION INTRAVENOUS at 10:08

## 2019-09-16 RX ADMIN — Medication 0.5 MILLIGRAM(S): at 14:08

## 2019-09-16 RX ADMIN — Medication 650 MILLIGRAM(S): at 16:08

## 2019-09-16 NOTE — CHART NOTE - NSCHARTNOTEFT_GEN_A_CORE
Asked to see pt for temp, surgery recommending abx    Noted to have T 102.2 Asked to see pt for temp, surgery recommending abx    Noted to have T 102.2 @2158, surgical service notified and evaluated patient who recommended antibiotics.    Pt seen @ 0011. Nursing also reports need to start O2 for SpO2 86% on room air.   C/o ongoing epigastric pain but denies chest pain, cough, dyspnea. No new c/o reported.     PHYSICAL EXAM:    Vital Signs Last 24 Hrs  T(C): 39 (15 Sep 2019 21:58), Max: 39 (15 Sep 2019 21:58)  T(F): 102.2 (15 Sep 2019 21:58), Max: 102.2 (15 Sep 2019 21:58)  HR: 85 (15 Sep 2019 21:58) (67 - 85)  BP: 102/50 (15 Sep 2019 21:58) (102/50 - 137/80)  BP(mean): --  RR: 18 (15 Sep 2019 21:58) (17 - 18)  SpO2: 93% (15 Sep 2019 21:58) (86% - 98%)    GENERAL: Pt on stretcher, HOB elevated  ENMT: PERRL, +EOMI.  NECK: soft, Supple, No JVD,   CHEST/LUNG: Clear to auscultation bilaterally, good aeration  HEART: S1S2+, Regular rate and rhythm; No murmurs.  ABDOMEN: Soft, , tender RUQ and epigastric Nondistended; Bowel sounds present.  SKIN: warm, dry  EXT: cap refill <3sec, strong distal pulses bilat  NEURO: AAOX3    MEDICATIONS  (STANDING):  lactated ringers. 1000 milliLiter(s) (125 mL/Hr) IV Continuous <Continuous>  pantoprazole    Tablet 40 milliGRAM(s) Oral before breakfast    MEDICATIONS  (PRN):  morphine  - Injectable 2 milliGRAM(s) IV Push every 4 hours PRN Moderate Pain (4 - 6)  ondansetron Injectable 4 milliGRAM(s) IV Push every 6 hours PRN Nausea and/or Vomiting    LABS:                        13.8   16.52 )-----------( 197      ( 15 Sep 2019 14:14 )             41.2     09-15    140  |  100  |  17.0  ----------------------------<  135<H>  4.3   |  23.0  |  0.90    Ca    8.8      15 Sep 2019 14:14  Mg     2.1     -15    TPro  6.8  /  Alb  4.0  /  TBili  2.1<H>  /  DBili  x   /  AST  124<H>  /  ALT  79<H>  /  AlkPhos  85  -15    PT/INR - ( 15 Sep 2019 14:14 )   PT: 12.3 sec;   INR: 1.07 ratio    PTT - ( 15 Sep 2019 14:14 )  PTT:28.4 sec    Urinalysis Basic - ( 15 Sep 2019 09:52 )    Color: Yellow / Appearance: Clear / S.025 / pH: x  Gluc: x / Ketone: Trace  / Bili: Negative / Urobili: Negative mg/dL   Blood: x / Protein: Negative mg/dL / Nitrite: Negative   Leuk Esterase: Negative / RBC: 0-5 /HPF / WBC 3-5   Sq Epi: x / Non Sq Epi: Few / Bacteria: Occasional    A/P  67 yo male obese with HTN , nephrolithiasis , tx ed from [facility] for suspected choledocholithiases , he has been having abd pain nausea since last night with vomiting per GI note reported US in the ED at [place]  showed CBD stone ? and gallstones   He is with leukocytosis , elevated LFT's     1-  fever and leukocytosis  - likely 2/2 cholangitis  - APAP for fever  - will check serum lactate, BCx2 (other sepsis labs completed)  - CXR  - continue current IVF pending lactate  - will start IV zosyn empirically  - Reviewed w Dr Barney    2- CHEL, hypoxia  - on CPAP 9cmH2O @ home per pt, will order here  - O2 PRN to maint SpO2 >92%

## 2019-09-16 NOTE — CHART NOTE - NSCHARTNOTEFT_GEN_A_CORE
ACS Resident was notified by RN that patient was febrile to 102.2. Evaluated patient at bedside. other vitals remain stable. abdominal exam was moderately tender in RUQ slightly worsened from earlier today.     - recommend primary team begin IV Abx  - plan for MRCP tomorrow  - Will continue to follow closely

## 2019-09-16 NOTE — PROGRESS NOTE ADULT - SUBJECTIVE AND OBJECTIVE BOX
Chief Complaint: This is a 68y old Male patient being seen for follow-up consultation for ?choledocholithiasis.    HPI / 24H events:  Patient still awaiting MRCP.  Febrile overnight to 102.2F.  Denies nausea, reports ongoing epigastric pain.    ROS: A 14-point review of systems was reviewed and was otherwise negative save what was reported in the HPI.    PAST MEDICAL/SURGICAL HISTORY:  Morbid obesity  Hyperlipemia  HTN (hypertension)  Nephrolithiasis  OA (osteoarthritis)  CHEL on CPAP  S/P arthroscopic knee surgery  S/P TKR (total knee replacement): bilateral    MEDICATIONS  (STANDING):  lactated ringers. 1000 milliLiter(s) (125 mL/Hr) IV Continuous <Continuous>  pantoprazole    Tablet 40 milliGRAM(s) Oral before breakfast  piperacillin/tazobactam IVPB.. 3.375 Gram(s) IV Intermittent every 8 hours    MEDICATIONS  (PRN):  acetaminophen   Tablet .. 650 milliGRAM(s) Oral every 6 hours PRN Temp greater or equal to 38C (100.4F)  morphine  - Injectable 2 milliGRAM(s) IV Push every 4 hours PRN Moderate Pain (4 - 6)  ondansetron Injectable 4 milliGRAM(s) IV Push every 6 hours PRN Nausea and/or Vomiting    T(C): 37.4 (09-16-19 @ 04:59), Max: 39 (09-15-19 @ 21:58)  HR: 79 (09-16-19 @ 04:59) (67 - 85)  BP: 110/60 (09-16-19 @ 04:59) (102/50 - 124/68)  RR: 18 (09-16-19 @ 04:59) (18 - 18)  SpO2: 96% (09-16-19 @ 04:59) (86% - 98%)    I&O's Summary                            13.8   16.52 )-----------( 197      ( 15 Sep 2019 14:14 )             41.2     09-15    140  |  100  |  17.0  ----------------------------<  135<H>  4.3   |  23.0  |  0.90    Ca    8.8      15 Sep 2019 14:14  Mg     2.1     09-15    TPro  6.8  /  Alb  4.0  /  TBili  2.1<H>  /  DBili  x   /  AST  124<H>  /  ALT  79<H>  /  AlkPhos  85  09-15    LIVER FUNCTIONS - ( 15 Sep 2019 14:14 )  Alb: 4.0 g/dL / Pro: 6.8 g/dL / ALK PHOS: 85 U/L / ALT: 79 U/L / AST: 124 U/L / GGT: x             Lipase, Serum: 236 U/L (09-15-19 @ 14:14)  Lipase, Serum: 52 U/L (09-15-19 @ 04:11)    PT/INR - ( 15 Sep 2019 14:14 )   PT: 12.3 sec;   INR: 1.07 ratio         PTT - ( 15 Sep 2019 14:14 )  PTT:28.4 sec

## 2019-09-16 NOTE — CHART NOTE - NSCHARTNOTEFT_GEN_A_CORE
MRCP result teviewed with radiologist     < from: MR MRCP No Cont (09.16.19 @ 15:46) >    RESSION:     Cholelithiasis with a dilated common bile duct measuring 1 cm with   multiple small common bile duct stones.    New gallbladder wall thickening suspicious for developing cholecystitis    New fluid surrounding the pancreatic head with additional ascites in the   right joey abdomen as described above, likely pancreatitis; correlation is   recommended pancreatic enzymes.    d/w GI attending Dr Sage ,ERCP is in am   d/w surgery team as well resident on call   pt is informed about MRCP result

## 2019-09-16 NOTE — PROGRESS NOTE ADULT - SUBJECTIVE AND OBJECTIVE BOX
HPI/OVERNIGHT EVENTS: Patient febrile to 102.2. No other events overnight. Patient planned for MRCP in AM.     MEDICATIONS  (STANDING):  lactated ringers. 1000 milliLiter(s) (125 mL/Hr) IV Continuous <Continuous>  pantoprazole    Tablet 40 milliGRAM(s) Oral before breakfast  piperacillin/tazobactam IVPB. 3.375 Gram(s) IV Intermittent once  piperacillin/tazobactam IVPB.. 3.375 Gram(s) IV Intermittent every 8 hours    MEDICATIONS  (PRN):  acetaminophen   Tablet .. 650 milliGRAM(s) Oral every 6 hours PRN Temp greater or equal to 38C (100.4F)  morphine  - Injectable 2 milliGRAM(s) IV Push every 4 hours PRN Moderate Pain (4 - 6)  ondansetron Injectable 4 milliGRAM(s) IV Push every 6 hours PRN Nausea and/or Vomiting      Vital Signs Last 24 Hrs  T(C): 39 (15 Sep 2019 21:58), Max: 39 (15 Sep 2019 21:58)  T(F): 102.2 (15 Sep 2019 21:58), Max: 102.2 (15 Sep 2019 21:58)  HR: 85 (15 Sep 2019 21:58) (67 - 85)  BP: 102/50 (15 Sep 2019 21:58) (102/50 - 137/80)  BP(mean): --  RR: 18 (15 Sep 2019 21:58) (17 - 18)  SpO2: 93% (15 Sep 2019 21:58) (86% - 98%)    Gen: well appearing male, NAD  Neuro: AOx3, EOMI, PERRLA, no gross deficit on exam  HEENT: No oral/mucosal lesions, no neck masses or lymphadenopathy  RESP: CTABL, nonlabored breathing  CVS: RRR,   GI: abd soft, mild epigastric tenderness, ND, no rebound/guarding. Diastasis recti  Extremities: 2+ peripheral pulses    I&O's Detail      LABS:                        13.8   16.52 )-----------( 197      ( 15 Sep 2019 14:14 )             41.2     09-15    140  |  100  |  17.0  ----------------------------<  135<H>  4.3   |  23.0  |  0.90    Ca    8.8      15 Sep 2019 14:14  Mg     2.1     09-15    TPro  6.8  /  Alb  4.0  /  TBili  2.1<H>  /  DBili  x   /  AST  124<H>  /  ALT  79<H>  /  AlkPhos  85  09-15    PT/INR - ( 15 Sep 2019 14:14 )   PT: 12.3 sec;   INR: 1.07 ratio         PTT - ( 15 Sep 2019 14:14 )  PTT:28.4 sec  Urinalysis Basic - ( 15 Sep 2019 09:52 )    Color: Yellow / Appearance: Clear / S.025 / pH: x  Gluc: x / Ketone: Trace  / Bili: Negative / Urobili: Negative mg/dL   Blood: x / Protein: Negative mg/dL / Nitrite: Negative   Leuk Esterase: Negative / RBC: 0-5 /HPF / WBC 3-5   Sq Epi: x / Non Sq Epi: Few / Bacteria: Occasional

## 2019-09-16 NOTE — PATIENT PROFILE ADULT - OVER THE PAST TWO WEEKS, HAVE YOU FELT LITTLE INTEREST OR PLEASURE IN DOING THINGS?
Closing encounter. New PCP is outside provider. Patient has not seen Dr. Aj since 11/29/16.    Jeimy ALMODOVAR Triage RN     no

## 2019-09-16 NOTE — PROGRESS NOTE ADULT - ASSESSMENT
69yo male transferred from OSH for evaluation of possible choledocholithiasis. Surgery consulted for possible lap cholecystectomy this admission following biliary clearance.     - agree w/ GI recs for MRCP.   - recommend IV ABx  - Surgery to continue to follow. 67yo male transferred from OSH for evaluation of possible choledocholithiasis. Surgery consulted for possible lap cholecystectomy this admission following biliary clearance.     - agree w/ GI recs for MRCP.   - recommend IV ABx  - will require cardiology clearance for eventual lap jose

## 2019-09-16 NOTE — PROGRESS NOTE ADULT - ASSESSMENT
67yo male transferred from [facility] for evaluation of possible choledocholithiasis.  seen by GI and surgery called to evaluate for cholecytectomy     1- Fever probable cholecystitis   blood cx done , on iv zosyn empirical iv abx     2- Choledocholithiasis  gi consulted   pain meds , NPO   MRCP today     3- CHEL pt can use his own CIPAP

## 2019-09-16 NOTE — PROGRESS NOTE ADULT - SUBJECTIVE AND OBJECTIVE BOX
Internal Medicine Hospitalist Progress Note    follow up for gallstone , fever , CBD stone ?   seen in am , he is feeling hungry   + abd pain controlled with pain meds , MRCP is pending   no nausea , no vomiting       ROS: as above, all remaining ROS are negative.       BACKGROUND:  MEDICATIONS  (STANDING):  lactated ringers. 1000 milliLiter(s) (125 mL/Hr) IV Continuous <Continuous>  pantoprazole    Tablet 40 milliGRAM(s) Oral before breakfast  piperacillin/tazobactam IVPB.. 3.375 Gram(s) IV Intermittent every 8 hours    MEDICATIONS  (PRN):  acetaminophen   Tablet .. 650 milliGRAM(s) Oral every 6 hours PRN Temp greater or equal to 38C (100.4F)  morphine  - Injectable 2 milliGRAM(s) IV Push every 4 hours PRN Moderate Pain (4 - 6)  ondansetron Injectable 4 milliGRAM(s) IV Push every 6 hours PRN Nausea and/or Vomiting    Allergies    No Known Allergies    Intolerances            VITALS:  Vital Signs Last 24 Hrs  T(C): 37.5 (16 Sep 2019 11:15), Max: 39 (15 Sep 2019 21:58)  T(F): 99.5 (16 Sep 2019 11:15), Max: 102.2 (15 Sep 2019 21:58)  HR: 88 (16 Sep 2019 11:15) (78 - 88)  BP: 122/71 (16 Sep 2019 11:15) (102/50 - 124/68)  BP(mean): --  RR: 18 (16 Sep 2019 11:15) (18 - 18)  SpO2: 94% (16 Sep 2019 11:15) (86% - 98%) Daily     Daily   CAPILLARY BLOOD GLUCOSE        I&O's Summary      PHYSICAL EXAM:      Constitutional: awake alert no distress     Neck: supple, no JVD     Respiratory: CTA bilateral     Cardiovascular: regular s1 /s2     Gastrointestinal: soft , +  tenderness in RUQ , epigastric area  BS positive     Extremities: no pretibial edeam     Vascular:    Neurological:    Skin:    Lymph Nodes:    Musculoskeletal:    Psychiatric:          LABS:                        13.8   16.52 )-----------( 197      ( 15 Sep 2019 14:14 )             41.2     09-15    140  |  100  |  17.0  ----------------------------<  135<H>  4.3   |  23.0  |  0.90    Ca    8.8      15 Sep 2019 14:14  Mg     2.1     09-15    TPro  6.8  /  Alb  4.0  /  TBili  2.1<H>  /  DBili  x   /  AST  124<H>  /  ALT  79<H>  /  AlkPhos  85  09-15    PT/INR - ( 15 Sep 2019 14:14 )   PT: 12.3 sec;   INR: 1.07 ratio         PTT - ( 15 Sep 2019 14:14 )  PTT:28.4 sec    Radiology : Internal Medicine Hospitalist Progress Note    follow up for gallstone , fever , CBD stone ?   seen in am , he is feeling hungry   + abd pain controlled with pain meds , MRCP is pending   no nausea , no vomiting       ROS: as above, all remaining ROS are negative.       BACKGROUND:  MEDICATIONS  (STANDING):  lactated ringers. 1000 milliLiter(s) (125 mL/Hr) IV Continuous <Continuous>  pantoprazole    Tablet 40 milliGRAM(s) Oral before breakfast  piperacillin/tazobactam IVPB.. 3.375 Gram(s) IV Intermittent every 8 hours    MEDICATIONS  (PRN):  acetaminophen   Tablet .. 650 milliGRAM(s) Oral every 6 hours PRN Temp greater or equal to 38C (100.4F)  morphine  - Injectable 2 milliGRAM(s) IV Push every 4 hours PRN Moderate Pain (4 - 6)  ondansetron Injectable 4 milliGRAM(s) IV Push every 6 hours PRN Nausea and/or Vomiting    Allergies    No Known Allergies    Intolerances            VITALS:  Vital Signs Last 24 Hrs  T(C): 37.5 (16 Sep 2019 11:15), Max: 39 (15 Sep 2019 21:58)  T(F): 99.5 (16 Sep 2019 11:15), Max: 102.2 (15 Sep 2019 21:58)  HR: 88 (16 Sep 2019 11:15) (78 - 88)  BP: 122/71 (16 Sep 2019 11:15) (102/50 - 124/68)  BP(mean): --  RR: 18 (16 Sep 2019 11:15) (18 - 18)  SpO2: 94% (16 Sep 2019 11:15) (86% - 98%) Daily     Daily   CAPILLARY BLOOD GLUCOSE        I&O's Summary      PHYSICAL EXAM:      Constitutional: awake alert no distress     Neck: supple, no JVD     Respiratory: CTA bilateral     Cardiovascular: regular s1 /s2     Gastrointestinal: soft , +  tenderness in RUQ , epigastric area  BS positive     Extremities: no pretibial edema     skin : normal color

## 2019-09-17 DIAGNOSIS — G47.33 OBSTRUCTIVE SLEEP APNEA (ADULT) (PEDIATRIC): ICD-10-CM

## 2019-09-17 DIAGNOSIS — R41.82 ALTERED MENTAL STATUS, UNSPECIFIED: ICD-10-CM

## 2019-09-17 LAB
GAS PNL BLDA: SIGNIFICANT CHANGE UP
GLUCOSE BLDC GLUCOMTR-MCNC: 132 MG/DL — HIGH (ref 70–99)
HCV AB S/CO SERPL IA: 0.06 S/CO — SIGNIFICANT CHANGE UP (ref 0–0.99)
HCV AB SERPL-IMP: SIGNIFICANT CHANGE UP

## 2019-09-17 PROCEDURE — 99222 1ST HOSP IP/OBS MODERATE 55: CPT

## 2019-09-17 PROCEDURE — 99232 SBSQ HOSP IP/OBS MODERATE 35: CPT

## 2019-09-17 PROCEDURE — 43264 ERCP REMOVE DUCT CALCULI: CPT

## 2019-09-17 PROCEDURE — 99233 SBSQ HOSP IP/OBS HIGH 50: CPT

## 2019-09-17 PROCEDURE — 71045 X-RAY EXAM CHEST 1 VIEW: CPT | Mod: 26

## 2019-09-17 PROCEDURE — 43262 ENDO CHOLANGIOPANCREATOGRAPH: CPT

## 2019-09-17 RX ORDER — ENOXAPARIN SODIUM 100 MG/ML
40 INJECTION SUBCUTANEOUS ONCE
Refills: 0 | Status: DISCONTINUED | OUTPATIENT
Start: 2019-09-17 | End: 2019-09-19

## 2019-09-17 RX ORDER — LACTOBACILLUS ACIDOPHILUS 100MM CELL
1 CAPSULE ORAL DAILY
Refills: 0 | Status: DISCONTINUED | OUTPATIENT
Start: 2019-09-17 | End: 2019-09-19

## 2019-09-17 RX ORDER — SODIUM CHLORIDE 9 MG/ML
1000 INJECTION, SOLUTION INTRAVENOUS
Refills: 0 | Status: DISCONTINUED | OUTPATIENT
Start: 2019-09-17 | End: 2019-09-17

## 2019-09-17 RX ORDER — ONDANSETRON 8 MG/1
4 TABLET, FILM COATED ORAL ONCE
Refills: 0 | Status: DISCONTINUED | OUTPATIENT
Start: 2019-09-17 | End: 2019-09-17

## 2019-09-17 RX ORDER — IBUPROFEN 200 MG
400 TABLET ORAL EVERY 6 HOURS
Refills: 0 | Status: DISCONTINUED | OUTPATIENT
Start: 2019-09-17 | End: 2019-09-19

## 2019-09-17 RX ORDER — SODIUM,POTASSIUM PHOSPHATES 278-250MG
1 POWDER IN PACKET (EA) ORAL THREE TIMES A DAY
Refills: 0 | Status: DISCONTINUED | OUTPATIENT
Start: 2019-09-17 | End: 2019-09-19

## 2019-09-17 RX ORDER — POTASSIUM PHOSPHATE, MONOBASIC POTASSIUM PHOSPHATE, DIBASIC 236; 224 MG/ML; MG/ML
15 INJECTION, SOLUTION INTRAVENOUS ONCE
Refills: 0 | Status: COMPLETED | OUTPATIENT
Start: 2019-09-17 | End: 2019-09-17

## 2019-09-17 RX ADMIN — ONDANSETRON 4 MILLIGRAM(S): 8 TABLET, FILM COATED ORAL at 23:17

## 2019-09-17 RX ADMIN — Medication 650 MILLIGRAM(S): at 08:14

## 2019-09-17 RX ADMIN — POTASSIUM PHOSPHATE, MONOBASIC POTASSIUM PHOSPHATE, DIBASIC 62.5 MILLIMOLE(S): 236; 224 INJECTION, SOLUTION INTRAVENOUS at 09:11

## 2019-09-17 RX ADMIN — PIPERACILLIN AND TAZOBACTAM 25 GRAM(S): 4; .5 INJECTION, POWDER, LYOPHILIZED, FOR SOLUTION INTRAVENOUS at 02:12

## 2019-09-17 RX ADMIN — Medication 25 MILLIGRAM(S): at 05:21

## 2019-09-17 RX ADMIN — Medication 650 MILLIGRAM(S): at 08:40

## 2019-09-17 RX ADMIN — Medication 1 PACKET(S): at 22:14

## 2019-09-17 RX ADMIN — PIPERACILLIN AND TAZOBACTAM 25 GRAM(S): 4; .5 INJECTION, POWDER, LYOPHILIZED, FOR SOLUTION INTRAVENOUS at 09:19

## 2019-09-17 RX ADMIN — Medication 400 MILLIGRAM(S): at 09:20

## 2019-09-17 RX ADMIN — Medication 50 MILLIGRAM(S): at 05:21

## 2019-09-17 RX ADMIN — ATORVASTATIN CALCIUM 10 MILLIGRAM(S): 80 TABLET, FILM COATED ORAL at 22:13

## 2019-09-17 RX ADMIN — Medication 400 MILLIGRAM(S): at 08:37

## 2019-09-17 RX ADMIN — PANTOPRAZOLE SODIUM 40 MILLIGRAM(S): 20 TABLET, DELAYED RELEASE ORAL at 05:21

## 2019-09-17 RX ADMIN — PIPERACILLIN AND TAZOBACTAM 25 GRAM(S): 4; .5 INJECTION, POWDER, LYOPHILIZED, FOR SOLUTION INTRAVENOUS at 19:35

## 2019-09-17 NOTE — CONSULT NOTE ADULT - SUBJECTIVE AND OBJECTIVE BOX
[facility]                                                        Office: [address] [place] [address]                                                       Telephone: [phone]. Fax:[phone]                                                              CARDIOLOGY CONSULTATION NOTE                                                                                             Consult requested by:  Dr. Mansfield    PCP Dr. Flakito Alejandra ([phone])    Primary Cardiologist. Dr. Ambrose/NP Freddy Thayer ([phone])    Reason for Consultation: Preop clearance    History obtained by: Patient and medical record     obtained: No    Chief complaint:    Patient is a 68y old  Male who presents with a chief complaint of tx from [facility] for gallstones / CBD stone (17 Sep 2019 14:28)      HPI:  67yo male with PMH Sleep apnea on Cpap, HTN, HLD, Gout transferred from [place] for ERCP and potential Cholecystectomy.  Patient evaluated s/p ERCP, which he had a difficult time being extubated and waking up from anesthesia, which he states he has had issues with in the past.  Follows closely with Cardio Dr. Ambrose. Per NP Freddy Thayer patient had cardiac clearance 19 for upcoming rotator cuff repair, nml echo 2018 EF 60-65% and valvulopathy, nml EST 2018 8min at 9mets.  Patient has good functional capacity and denies any cardiac complaints. Denies chest pain/pressure, palpitations, irregular and/or rapid heart beat, SOB, ROSS, syncope/near syncope, dizziness, orthopnea, PND, cough, edema, n/v/d, hematuria, or hematochezia.       ALLERGIES: Allergies    No Known Allergies    Intolerances          CURRENT MEDICATIONS:  MEDICATIONS  (STANDING):  allopurinol 100 milliGRAM(s) Oral daily  atorvastatin 10 milliGRAM(s) Oral at bedtime  enoxaparin Injectable 40 milliGRAM(s) SubCutaneous once  hydrochlorothiazide 25 milliGRAM(s) Oral daily  indomethacin Suppository 100 milliGRAM(s) Rectal once  lactated ringers. 1000 milliLiter(s) (150 mL/Hr) IV Continuous <Continuous>  lactated ringers. 1000 milliLiter(s) (100 mL/Hr) IV Continuous <Continuous>  lactobacillus acidophilus 1 Tablet(s) Oral daily  metoprolol succinate ER 50 milliGRAM(s) Oral daily  pantoprazole    Tablet 40 milliGRAM(s) Oral before breakfast  piperacillin/tazobactam IVPB.. 3.375 Gram(s) IV Intermittent every 8 hours      HOME MEDICATIONS:  Home Medications:  allopurinol 100 mg oral tablet: 1 tab(s) orally once a day, As Needed (16 Sep 2019 14:34)  hydroCHLOROthiazide 25 mg oral tablet: 1 tab(s) orally once a day (16 Sep 2019 14:34)  Metoprolol Succinate ER 50 mg oral tablet, extended release: 1 tab(s) orally once a day (16 Sep 2019 14:34)  pravastatin 10 mg oral tablet: 1 tab(s) orally once a day (16 Sep 2019 14:34)    PAST MEDICAL HISTORY  Morbid obesity  Hyperlipemia  HTN (hypertension)  Nephrolithiasis  OA (osteoarthritis)  CHEL on CPAP      PAST SURGICAL HISTORY  S/P arthroscopic knee surgery  S/P TKR (total knee replacement)      FAMILY HISTORY:  Family history of gallstones: mother   she  at age last 80&#x27;s  FH: heart disease: dad  at age high 70&#x27;s      SOCIAL HISTORY:       CIGARETTES:   denies    ALCOHOL  denies    DRUG ABUSE  denies      REVIEW OF SYSTEMS:  CONSTITUTIONAL:  as per HPI  HEENT:  Eyes:  No diplopia or blurred vision. ENT:  No earache, sore throat or runny nose.  CARDIOVASCULAR:  No pressure, squeezing, strangling, tightness, heaviness or aching about the chest, neck, axilla or epigastrium.  RESPIRATORY:  No cough, shortness of breath, PND or orthopnea.  GASTROINTESTINAL:  No nausea, vomiting or diarrhea.  GENITOURINARY:  No dysuria, frequency or urgency. No Blood in urine  MUSCULOSKELETAL:  no joint aches, no muscle pain  SKIN:  No change in skin, hair or nails.  NEUROLOGIC:  No paresthesias, fasciculations, seizures or weakness.  PSYCHIATRIC:  No disorder of thought or mood.  ENDOCRINE:  No heat or cold intolerance, polyuria or polydipsia.  HEMATOLOGICAL:  No easy bruising or bleeding.         Vital Signs Last 24 Hrs  T(C): 36.1 (19 @ 13:55), Max: 38.6 (19 @ 08:18)  T(F): 97 (19 @ 13:55), Max: 101.4 (19 @ 08:18)  HR: 68 (09-17-19 @ 15:15) (58 - 89)  BP: 119/93 (19 @ 15:15) (100/59 - 144/70)  BP(mean): --  RR: 22 (19 @ 15:15) (12 - 22)  SpO2: 94% (19 @ 15:15) (88% - 100%)    PHYSICAL EXAM:  Constitutional: Comfortable . No acute distress.   HEENT: Atraumatic and normcephalic , neck is supple . no JVD. Unremarkable  CNS: Alert and awake, Grossly nonfocal.  Lymph Nodes: Cervical : Not palpable.  Respiratory: Bilateral clear breath sounds.  Cardiovascular: Normal S1S2. . No murmur/rubs or gallop.  Gastrointestinal: Soft non-tender and non distended . +Bowel sounds.   Extremities: No leg edema.   Psychiatric: Calm . no agitation.  Skin: No skin rash.    Intake and output:     LABS:                        13.3   16.33 )-----------( 171      ( 16 Sep 2019 17:34 )             39.6         138  |  100  |  15.0  ----------------------------<  116<H>  3.7   |  24.0  |  0.90    Ca    8.7      16 Sep 2019 17:34  Phos  1.7         TPro  6.4<L>  /  Alb  3.5  /  TBili  3.4<H>  /  DBili  0.3  /  AST  74<H>  /  ALT  62<H>  /  AlkPhos  70        ;p-BNP=      LIVER FUNCTIONS - ( 16 Sep 2019 17:34 )  Alb: 3.5 g/dL / Pro: 6.4 g/dL / ALK PHOS: 70 U/L / ALT: 62 U/L / AST: 74 U/L / GGT: x           INTERPRETATION OF TELEMETRY: NSR 60s  ECG: NSR 86bpm, RBBB    RADIOLOGY & ADDITIONAL STUDIES/ECHO/CARDIAC CATH:   < from: MR MRCP No Cont (19 @ 15:46) >  IMPRESSION:     Cholelithiasis with a dilated common bile duct measuring 1 cm with   multiple small common bile duct stones.    New gallbladder wall thickening suspicious for developing cholecystitis    New fluid surrounding the pancreatic head with additional ascites in the   right hemiabdomen as described above, likely pancreatitis; correlation is   recommended pancreatic enzymes.    < end of copied text >    < from: CT Abdomen and Pelvis No Cont (09.15.19 @ 06:13) >  IMPRESSION:      No significant interval change since prior CT of 10/18/2018. No   hydronephrosis or urinary tract calculus. Bilateral perinephric   stranding, nonspecific.    Cholelithiasis.    < end of copied text >
PULMONARY CONSULT NOTE      SHWETA VELAZCOCAROLINA-[id number]    Patient is a 68y old  Male who presents with a chief complaint of tx from [facility] for gallstones / CBD stone (17 Sep 2019 15:38)    67yo male with PMH Sleep apnea on Cpap, HTN, HLD, Gout transferred from [place] for ERCP and potential Cholecystectomy.   At ERCP, patient did not wake easily but eventually, awaked and was extubated  The patient has known sleep apnea, uses CPAP nightly at 9 cm , he believes  Patient had earlier preop cardiac eval with repeat evaluation by Dr. Lion today    INTERVAL HPI/OVERNIGHT EVENTS:    MEDICATIONS  (STANDING):  allopurinol 100 milliGRAM(s) Oral daily  atorvastatin 10 milliGRAM(s) Oral at bedtime  enoxaparin Injectable 40 milliGRAM(s) SubCutaneous once  hydrochlorothiazide 25 milliGRAM(s) Oral daily  indomethacin Suppository 100 milliGRAM(s) Rectal once  lactated ringers. 1000 milliLiter(s) (150 mL/Hr) IV Continuous <Continuous>  lactated ringers. 1000 milliLiter(s) (100 mL/Hr) IV Continuous <Continuous>  lactobacillus acidophilus 1 Tablet(s) Oral daily  metoprolol succinate ER 50 milliGRAM(s) Oral daily  pantoprazole    Tablet 40 milliGRAM(s) Oral before breakfast  piperacillin/tazobactam IVPB.. 3.375 Gram(s) IV Intermittent every 8 hours      MEDICATIONS  (PRN):  acetaminophen   Tablet .. 650 milliGRAM(s) Oral every 6 hours PRN Temp greater or equal to 38C (100.4F)  ibuprofen  Tablet. 400 milliGRAM(s) Oral every 6 hours PRN Temp greater or equal to 38.5C (101.3F)  morphine  - Injectable 2 milliGRAM(s) IV Push every 4 hours PRN Moderate Pain (4 - 6)  ondansetron Injectable 4 milliGRAM(s) IV Push every 6 hours PRN Nausea and/or Vomiting  ondansetron Injectable 4 milliGRAM(s) IV Push once PRN Nausea and/or Vomiting      Allergies    No Known Allergies    Intolerances        PAST MEDICAL & SURGICAL HISTORY:  Morbid obesity  Hyperlipemia  HTN (hypertension)  Nephrolithiasis  OA (osteoarthritis)  CHEL on CPAP  S/P arthroscopic knee surgery  S/P TKR (total knee replacement): bilateral      FAMILY HISTORY:  Family history of gallstones: mother   she  at age last 80&#x27;s  FH: heart disease: dad  at age high 70&#x27;s      SOCIAL HISTORY  Smoking History:     REVIEW OF SYSTEMS:    CONSTITUTIONAL:  As per HPI.    HEENT:  Eyes:  No diplopia or blurred vision. ENT:  No earache, sore throat or runny nose.    CARDIOVASCULAR:  No pressure, squeezing, tightness, heaviness or aching about the chest; no palpitations.    RESPIRATORY:  No cough, shortness of breath, PND or orthopnea. Mild SOBOE    GASTROINTESTINAL:  No nausea, vomiting or diarrhea.    GENITOURINARY:  No dysuria, frequency or urgency.    MUSCULOSKELETAL:  No joint pains    SKIN:  No new lesions.    NEUROLOGIC:  No paresthesias, fasciculations, seizures or weakness.    PSYCHIATRIC:  No disorder of thought or mood.    ENDOCRINE:  No heat or cold intolerance, polyuria or polydipsia.    HEMATOLOGICAL:  No easy bruising or bleeding.     Vital Signs Last 24 Hrs  T(C): 36.1 (17 Sep 2019 13:55), Max: 38.6 (17 Sep 2019 08:18)  T(F): 97 (17 Sep 2019 13:55), Max: 101.4 (17 Sep 2019 08:18)  HR: 68 (17 Sep 2019 15:15) (64 - 89)  BP: 119/93 (17 Sep 2019 15:15) (100/59 - 141/74)  BP(mean): --  RR: 22 (17 Sep 2019 15:15) (12 - 22)  SpO2: 94% (17 Sep 2019 15:15) (89% - 100%)    PHYSICAL EXAMINATION:    GENERAL: The patient is a well-developed, mildly obese male, alert and_____in no apparent distress.     HEENT: Head is normocephalic and atraumatic. Extraocular muscles are intact. Mucous membranes are moist.     NECK: Supple.     LUNGS: Clear to auscultation without wheezing, rales, or rhonchi. Respirations unlabored    HEART: Regular rate and rhythm without murmur.    ABDOMEN: Soft, nontender, and nondistended.  No hepatosplenomegaly is noted.    EXTREMITIES: Without any cyanosis, clubbing, rash, lesions or edema.    NEUROLOGIC: Grossly intact.    SKIN: No ulceration or induration present.      LABS:                        13.3   16.33 )-----------( 171      ( 16 Sep 2019 17:34 )             39.6         138  |  100  |  15.0  ----------------------------<  116<H>  3.7   |  24.0  |  0.90    Ca    8.7      16 Sep 2019 17:34  Phos  1.7         TPro  6.4<L>  /  Alb  3.5  /  TBili  3.4<H>  /  DBili  0.3  /  AST  74<H>  /  ALT  62<H>  /  AlkPhos  70                          MICROBIOLOGY:    RADIOLOGY & ADDITIONAL STUDIES:< from: Xray Chest 1 View- PORTABLE-Urgent (19 @ 15:00) >  INTERPRETATION:  TIME OF EXAM: 2019 at 2:53 PM.    CLINICAL INFORMATION: Status post ERCP. Obstructive sleep apnea. Evaluate   for infection.    COMPARISON:  2019.    TECHNIQUE:   AP Portable chest x-ray. Lordotic and rotated.    INTERPRETATION:     Heart size and the mediastinum cannot be accurately evaluated on this   projection. No focal lung consolidation seen. Possible trace right   pleural effusion. No left pleural effusion. No pneumothorax.  There is osteoarthritic degenerative change of the spine.              IMPRESSION:  Possible trace right pleural effusion.    No focal lung consolidation seen.      < end of copied text >
Patient is a 68y old  Male who presents with a chief complaint of abdominal pain    HPI: 67 y/o male transfer from  for ? choledocholithiasis. He presented there earlier this AM with severe constant epigastric pain which he thought was secondary to nephrolithiasis which he has had in the past. While in  ED he had an abdominal sonogram that showed multiple gallstones with no dilated ducts and a fatty liver. CT there essentially saw the same and he was transferred her because on the Radiology report of the abdominal sonogram it stated that he had multiple CBD stones on the above CT scan which was not indicated by that CT report. The abdominal sonogram showed no CBD stones. Both the CT and sono showed multiple gallstones in the GB but neither study revealed CBD stones and the above mentioned CT report Impression was fatty liver and cholelithiasis w/o mention of CBD stones. Pt apparently transferred here based on the mention of multiple CBD stones on the CT report which was not actually the case. Pt is however in significant pain when seen here and has no prior h/o GB disease.      REVIEW OF SYSTEMS:  Constitutional: No fever, weight loss or fatigue  ENMT:  No difficulty hearing, tinnitus, vertigo; No sinus or throat pain  Respiratory: No cough, wheezing, chills or hemoptysis  Cardiovascular: No chest pain, palpitations, dizziness or leg swelling  Gastrointestinal: As per HPI. + N/V; No diarrhea or constipation. No melena or hematochezia.  Skin: No itching, burning, rashes or lesions   Musculoskeletal: No joint pain or swelling; No muscle, back or extremity pain  Patient has no cardiopulmonary, peripheral vascular, musculoskeletal, dermatological, neurological,    or psychological symptoms or complaints at this time.    PAST MEDICAL & SURGICAL HISTORY:  Morbid obesity  Hyperlipemia  HTN (hypertension)  Nephrolithiasis  OA (osteoarthritis)  CHEL on CPAP  S/P arthroscopic knee surgery  S/P TKR (total knee replacement): bilateral      FAMILY HISTORY: N/C for GI disease      SOCIAL HISTORY:  Smoking Status: [ ] Current, [ ] Former, [ x] Never  Pack Years: N/A. No ETOH or drug abuse history    MEDICATIONS:  MEDICATIONS  (STANDING):    MEDICATIONS  (PRN):      Allergies    No Known Allergies    Intolerances        Vital Signs Last 24 Hrs  T(C): 36.7 (15 Sep 2019 13:09), Max: 37.3 (15 Sep 2019 03:39)  T(F): 98.1 (15 Sep 2019 13:09), Max: 99.2 (15 Sep 2019 03:39)  HR: 81 (15 Sep 2019 13:09) (67 - 81)  BP: 122/61 (15 Sep 2019 13:09) (104/52 - 137/80)  BP(mean): --  RR: 18 (15 Sep 2019 13:09) (17 - 18)  SpO2: 98% (15 Sep 2019 13:09) (95% - 98%)        PHYSICAL EXAM:    General: Well developed; morbidly obese; in distress when seen.  HEENT: MMM, conjunctiva pink and sclera anicteric.  Lungs: Clear bilaterally  Cor: RRR S1, S2 only  Gastrointestinal: Abdomen: Soft, obese, significant epigastric tenderness non-distended; Normal bowel sounds; No rebound or guarding or HSM.  IVÁN: Not done. Not indicated.  Extremities: Normal range of motion, No clubbing, cyanosis or edema  Neurological: Alert and oriented x3  Skin: Warm and dry. No obvious rash      LABS:                        14.4   14.55 )-----------( 210      ( 15 Sep 2019 04:11 )             42.0     09-15    140  |  105  |  18  ----------------------------<  171<H>  3.9   |  24  |  1.11    Ca    8.9      15 Sep 2019 04:11  Mg     2.1     09-15    TPro  7.2  /  Alb  3.8  /  TBili  1.3<H>  /  DBili  x   /  AST  27  /  ALT  37  /  AlkPhos  81  09-15          RADIOLOGY & ADDITIONAL STUDIES:   CT and abdominal sonogram results from  noted.
Patient is a 68y old  Male who presents with a chief complaint of tx from [facility] for gallstones / CBD stone (17 Sep 2019 13:33)    BRIEF HOSPITAL COURSE: Pt is a 69 y/o w/ a PMHx of HTN, HLD, sleep apnea (CPAP at home) who was transferred from [facility] after abdominal US revealed possible choledocholithiasis. Pt underwent MRCP yesterday, which revealed CBD dilation w/ multiple small stones, developing cholecystitis, and likely pancreatitis.    Events last 24 hours: Pt underwent ERCP today. MICU consult called post procedure due to inability to extubate pt because he was not waking up. On my arrival to pt's bedside, pt had woken up and been extubated, on non-rebreather mask. Patient endorses throat pain from ETT.     PAST MEDICAL & SURGICAL HISTORY:  Morbid obesity  Hyperlipemia  HTN (hypertension)  Nephrolithiasis  OA (osteoarthritis)  CHEL on CPAP  S/P arthroscopic knee surgery  S/P TKR (total knee replacement): bilateral    Allergies  No Known Allergies      FAMILY HISTORY:  Family history of gallstones: mother   she  at age last 80&#x27;s  FH: heart disease: dad  at age high 70&#x27;s    Social History: Denies tobacco use.    Review of Systems:  All ROS negative except as appreciated above.        Vitals During Exam:   HR: 98  BP: 130s/80s  RR: 21  sPO2: 100% on non-rebreather    Physical Examination:    General: Middle aged obese male, lying in bed, No acute distress.      HEENT: Pupils 3mm, equal, reactive to light.  Symmetric. NRB in place.  No stridor appreciated.     PULM: Symmetrical thorax expansion upon respiration.  Clear to auscultation bilaterally, no significant sputum production.     CVS: Regular rate and rhythm, no murmurs, rubs, or gallops.    ABD: Soft, nondistended, nontender, normoactive bowel sounds, no masses.    EXT: No edema, nontender, DP pulses symmetrical    SKIN: Warm and well perfused, no rashes noted.    NEURO: Alert, oriented, interactive, nonfocal.      Medications:  piperacillin/tazobactam IVPB.. 3.375 Gram(s) IV Intermittent every 8 hours  hydrochlorothiazide 25 milliGRAM(s) Oral daily  metoprolol succinate ER 50 milliGRAM(s) Oral daily  acetaminophen   Tablet .. 650 milliGRAM(s) Oral every 6 hours PRN  ibuprofen  Tablet. 400 milliGRAM(s) Oral every 6 hours PRN  indomethacin Suppository 100 milliGRAM(s) Rectal once  morphine  - Injectable 2 milliGRAM(s) IV Push every 4 hours PRN  ondansetron Injectable 4 milliGRAM(s) IV Push every 6 hours PRN  ondansetron Injectable 4 milliGRAM(s) IV Push once PRN  enoxaparin Injectable 40 milliGRAM(s) SubCutaneous once  pantoprazole    Tablet 40 milliGRAM(s) Oral before breakfast  allopurinol 100 milliGRAM(s) Oral daily  atorvastatin 10 milliGRAM(s) Oral at bedtime  lactated ringers. 1000 milliLiter(s) IV Continuous <Continuous>  lactated ringers. 1000 milliLiter(s) IV Continuous <Continuous>  lactobacillus acidophilus 1 Tablet(s) Oral daily    Mode: AC/ CMV (Assist Control/ Continuous Mandatory Ventilation)  RR (machine): 12  TV (machine): 600  FiO2: 100  PEEP: 5  MAP: 11  PIP: 28    ICU Vital Signs Last 24 Hrs  T(C): 36.8 (17 Sep 2019 09:30), Max: 38.6 (17 Sep 2019 08:18)  T(F): 98.3 (17 Sep 2019 09:30), Max: 101.4 (17 Sep 2019 08:18)  HR: 68 (17 Sep 2019 13:58) (58 - 89)  BP: 136/85 (17 Sep 2019 08:18) (136/85 - 144/70)  BP(mean): --  ABP: --  ABP(mean): --  RR: 18 (17 Sep 2019 08:18) (16 - 18)  SpO2: 95% (17 Sep 2019 13:58) (88% - 95%)    Vital Signs Last 24 Hrs  T(C): 36.8 (17 Sep 2019 09:30), Max: 38.6 (17 Sep 2019 08:18)  T(F): 98.3 (17 Sep 2019 09:30), Max: 101.4 (17 Sep 2019 08:18)  HR: 68 (17 Sep 2019 13:58) (58 - 89)  BP: 136/85 (17 Sep 2019 08:18) (136/85 - 144/70)  BP(mean): --  RR: 18 (17 Sep 2019 08:18) (16 - 18)  SpO2: 95% (17 Sep 2019 13:58) (88% - 95%)    I&O's Detail    LABS:                        13.3   16.33 )-----------( 171      ( 16 Sep 2019 17:34 )             39.6         138  |  100  |  15.0  ----------------------------<  116<H>  3.7   |  24.0  |  0.90    Ca    8.7      16 Sep 2019 17:34  Phos  1.7         TPro  6.4<L>  /  Alb  3.5  /  TBili  3.4<H>  /  DBili  0.3  /  AST  74<H>  /  ALT  62<H>  /  AlkPhos  70      CAPILLARY BLOOD GLUCOSE    POCT Blood Glucose.: 132 mg/dL (17 Sep 2019 13:56)    CULTURES:  Culture Results:   No growth to date. (09-15 @ 11:18)  Culture Results:   No growth to date. (09-15 @ 11:06)  Culture Results:   No growth (09-15 @ 09:52)    RADIOLOGY:   < from: MR MRCP No Cont (19 @ 15:46) >  EXAM:  MR MRCP                          PROCEDURE DATE:  2019      INTERPRETATION:  CLINICAL INFORMATION: Choledocholithiasis.    COMPARISON: CT abdomen/pelvis and abdominal ultrasound 9/15/2019    PROCEDURE:   MRI of the abdomen was performed without intravenous contrast.  IV Contrast: None. .  MRCP was performed.    FINDINGS:    LOWER CHEST: New small bilateral pleural effusions. Opacity in the right   lower lobe, likely atelectasis.    LIVER: Fatty infiltration.   BILE DUCTS: Dilated common bile duct measuring 1 cm with multiple common   bile duct stones layering along the posterior aspect of the downstream   common bile duct.  GALLBLADDER: Cholelithiasis with additional layering low T2 signal   material in the gallbladder, likely sludge. The gallbladder is distended   with new mild wall thickening  SPLEEN: Within normal limits.  PANCREAS: New fluid/ascites surrounding the pancreatic head, proximal   duodenum, and at the inferior aspect of the gallbladder and the right   paracolic gutter. There is pancreas divisum. Main pancreatic duct normal   in caliber.  ADRENALS: Left adrenal gland thickening. Unremarkable right adrenal gland.  KIDNEYS/URETERS: Normal size. 2 cm left renal cyst and 1 cm right renal   cyst. No hydronephrosis.    VISUALIZED PORTIONS:    BOWEL: Within normal limits.   PERITONEUM: Small amount of abdominal ascites at the inferior aspect of   the gallbladder at the right paracolic gutter, new since 9/15/2019.  VESSELS: Abdominal aorta normal in caliber.  RETROPERITONEUM/LYMPH NODES: No lymphadenopathy.    ABDOMINAL WALL: Within normal limits.  BONES: High T2 signal lesions in the T12 and L1 vertebral bodies, likely   hemangiomas.  OTHER: Bladder wall appears trabeculated on the larger field of view T2   weighted sequence.    IMPRESSION:     Cholelithiasis with a dilated common bile duct measuring 1 cm with   multiple small common bile duct stones.    New gallbladder wall thickening suspicious for developing cholecystitis    New fluid surrounding the pancreatic head with additional ascites in the   right hemiabdomen as described above, likely pancreatitis; correlation is   recommended pancreatic enzymes.    The findings were discussed with Dr. Maya at 4:00 PM on 2019.    PHIL MCLAIN   This document has been electronically signed. Sep 16 2019  4:07PM      < end of copied text >    SUPPLEMENTAL O2: 4 L NC  LINES: Peripheral   IVF: LR 1000 mL at 100 mL/hr  VINSON: None  PPx: N  CONTACT: None
Trauma Surgery Consult Note    HPI:   68yMale w/ PMH obesity, CHEL, and HTN transferred from [facility] on 09-15-19 for evaluation of choledocholithiasis. Pt states that last night he began having severe epigastric pain radiating to his back associated w/ NBNB emesis. Pt sx did not improve w/ OTC meds so he sought care at , where RUQ u/s showed CBD 6mm and leukocytosis to 15. Due to concern for choledocho, pt was transferred to [facility]. Pt bilirubin noted to increase to 2.3 and he continued to have leukocytosis. GI consulted who recommended surgical consult. Pt has not had intraabdominal surgery before. He is not on any blood thinners. He has never had abdominal pain like this in the past. He has had recent colonoscopy, states there were no concerning findings.     PMH:  Morbid obesity [Active]  Hyperlipemia [Active]  HTN (hypertension) [Active]  Nephrolithiasis [Active]  OA (osteoarthritis) [Active]  CHEL on CPAP [Active]    PSH:  S/P arthroscopic knee surgery  S/P TKR (total knee replacement): bilateral    Home Medications:  allopurinol  hctz    ALL:  NKDA      FMH:  Denies family history of gastrointestinal malignancy     SOC Hx:   Denies etoh, tobacco, or drug use       Physical Exam:   Gen: well appearing male, NAD  Neuro: AOx3, EOMI, PERRLA, no gross deficit on exam  HEENT: No oral/mucosal lesions, no neck masses or lymphadenopathy  RESP: CTABL, nonlabored breathing  CVS: RRR,   GI: abd soft, mild epigastric tenderness, ND, no rebound/guarding. Diastasis recti  Extremities: 2+ peripheral pulses

## 2019-09-17 NOTE — CONSULT NOTE ADULT - CONSULT REASON
Preop clearance
CHEL
Epigastric pain / Cholelithiasis ? Choledocholithiasis
Pt unable to be extubated after ERCP
choledocholithiasis

## 2019-09-17 NOTE — CHART NOTE - NSCHARTNOTEFT_GEN_A_CORE
Called by Dr Ramires after ERCP , pt was not waking up after ERCP given anesthesia   was intubated and brought to PACU by anestesiologist, saw the patient on the vent sedated   vitals were  stable   d/w anesthesiologist , ICU team called to evaluate     wife informed by me re course and updated  her current condition   Pt then started to wake up , able to follow commands , coughing '  extubated by anestesiologist , on VM , he is awake alert now to place events , moves all extremities     , will get CXR and stat ABG   called wife again and updated her about his current condition   will monitor tx to step down unit /4brk  likely pending ABG

## 2019-09-17 NOTE — PROGRESS NOTE ADULT - SUBJECTIVE AND OBJECTIVE BOX
HPI/OVERNIGHT EVENTS:  Febrile overnight to 101.1 F with lower PO2 sats at 88-89%. No other events overnight. Yesterday MRCP showed CBD dilation w multiple small stones, developing cholecystitis, and likely pancreatitis. Plan for ERCP today. Denies nausea, vomiting, chest pain, shortness of breath, or any new or concerning symptoms.     MEDICATIONS  (STANDING):  allopurinol 100 milliGRAM(s) Oral daily  atorvastatin 10 milliGRAM(s) Oral at bedtime  hydrochlorothiazide 25 milliGRAM(s) Oral daily  indomethacin Suppository 100 milliGRAM(s) Rectal once  lactated ringers. 1000 milliLiter(s) (150 mL/Hr) IV Continuous <Continuous>  metoprolol succinate ER 50 milliGRAM(s) Oral daily  pantoprazole    Tablet 40 milliGRAM(s) Oral before breakfast  piperacillin/tazobactam IVPB.. 3.375 Gram(s) IV Intermittent every 8 hours    MEDICATIONS  (PRN):  acetaminophen   Tablet .. 650 milliGRAM(s) Oral every 6 hours PRN Temp greater or equal to 38C (100.4F)  morphine  - Injectable 2 milliGRAM(s) IV Push every 4 hours PRN Moderate Pain (4 - 6)  ondansetron Injectable 4 milliGRAM(s) IV Push every 6 hours PRN Nausea and/or Vomiting      Vital Signs Last 24 Hrs  T(C): 37.2 (17 Sep 2019 00:03), Max: 38.4 (16 Sep 2019 15:59)  T(F): 99 (17 Sep 2019 00:03), Max: 101.1 (16 Sep 2019 15:59)  HR: 88 (17 Sep 2019 00:03) (58 - 88)  BP: 141/74 (17 Sep 2019 00:03) (108/66 - 144/70)  BP(mean): --  RR: 16 (17 Sep 2019 00:03) (16 - 18)  SpO2: 89% (17 Sep 2019 00:03) (88% - 96%)    Gen: well appearing male, laying in bed in NAD  Neuro: AOx3, EOMI  HEENT: No oral/mucosal lesions, no neck masses or lymphadenopathy  RESP: CTABL, nonlabored breathing  CVS: RRR,   GI: abd soft, mild epigastric tenderness, ND, no rebound/guarding. Diastasis recti  Extremities: 2+ peripheral pulses      I&O's Detail      LABS:                        13.3   16.33 )-----------( 171      ( 16 Sep 2019 17:34 )             39.6     09-16    138  |  100  |  15.0  ----------------------------<  116<H>  3.7   |  24.0  |  0.90    Ca    8.7      16 Sep 2019 17:34  Phos  1.7       Mg     2.1     09-15    TPro  6.4<L>  /  Alb  3.5  /  TBili  3.4<H>  /  DBili  0.3  /  AST  74<H>  /  ALT  62<H>  /  AlkPhos  70  -    PT/INR - ( 15 Sep 2019 14:14 )   PT: 12.3 sec;   INR: 1.07 ratio         PTT - ( 15 Sep 2019 14:14 )  PTT:28.4 sec  Urinalysis Basic - ( 15 Sep 2019 09:52 )    Color: Yellow / Appearance: Clear / S.025 / pH: x  Gluc: x / Ketone: Trace  / Bili: Negative / Urobili: Negative mg/dL   Blood: x / Protein: Negative mg/dL / Nitrite: Negative   Leuk Esterase: Negative / RBC: 0-5 /HPF / WBC 3-5   Sq Epi: x / Non Sq Epi: Few / Bacteria: Occasional

## 2019-09-17 NOTE — CONSULT NOTE ADULT - ATTENDING COMMENTS
Patient seen and examined by me.  I have discussed my recommendation with the PA which are outlined above.  Will sign off.    Please reconsult if you have any questions.

## 2019-09-17 NOTE — CONSULT NOTE ADULT - REASON FOR ADMISSION
tx from [facility] for gallstones / CBD stone
tx from [facility] for gallstones / CBD stone
tx from [facility] for gallstones / CBD stone

## 2019-09-17 NOTE — PROGRESS NOTE ADULT - SUBJECTIVE AND OBJECTIVE BOX
Internal Medicine Hospitalist Progress Note    follow up for gallstone , fever , CBD stone/ cholecystitis    seen in am , he is feeling better , + fever this morning , no nausea   ERCP performed after my visit this morning , result reviewed   + abd pain controlled with pain meds      Vital Signs Last 24 Hrs  T(C): 36.8 (17 Sep 2019 09:30), Max: 38.6 (17 Sep 2019 08:18)  T(F): 98.3 (17 Sep 2019 09:30), Max: 101.4 (17 Sep 2019 08:18)  HR: 88 (17 Sep 2019 09:30) (58 - 89)  BP: 136/85 (17 Sep 2019 08:18) (136/85 - 144/70)  BP(mean): --  RR: 18 (17 Sep 2019 08:18) (16 - 18)  SpO2: 93% (17 Sep 2019 08:18) (88% - 93%)    I&O's Summary      PHYSICAL EXAM:      Constitutional: awake alert no distress     Neck: supple, no JVD     Respiratory: CTA bilateral     Cardiovascular: regular s1 /s2     Gastrointestinal: soft , +  tenderness in RUQ , epigastric area  BS positive     Extremities: no pretibial edema                             13.3   16.33 )-----------( 171      ( 16 Sep 2019 17:34 )             39.6   09-16    138  |  100  |  15.0  ----------------------------<  116<H>  3.7   |  24.0  |  0.90    Ca    8.7      16 Sep 2019 17:34  Phos  1.7     09-16    TPro  6.4<L>  /  Alb  3.5  /  TBili  3.4<H>  /  DBili  0.3  /  AST  74<H>  /  ALT  62<H>  /  AlkPhos  70  09-16

## 2019-09-17 NOTE — BRIEF OPERATIVE NOTE - COMMENTS
Tolerated well. Tolerated well.  Sedated post procedure;  therefore not immediately extubated.   Transferred to PACU on Vent, as per anesthesia recommendations.

## 2019-09-17 NOTE — CHART NOTE - NSCHARTNOTEFT_GEN_A_CORE
ACS/Trauma team contacted regarding patient's inability to be extubated s/p ERCP. Patient seen and examined in PACU with wife at bedside. No evidence of increased work of breathing or accessory muscle use. Speaking limited by presence of face mask. Found extubated with VS as following: /71 HR 69 RR22 O2 97% on face mask. Wife concerning regarding impending lap jose tomorrow (9/18) because "if he is having trouble waking up from smaller procedure how will he do tomorrow with a larger procedure"? Informed by ACS/Trauma that primary team had contacted cardiology for pre-op risk stratification prior to procedure. Bedside nurse reports to having seen cardiology examine the patient prior to ACS/Trauma team arrival. Team will follow. ACS/Trauma team contacted regarding patient's inability to be extubated s/p ERCP. Patient seen and examined in PACU with wife at bedside. No evidence of increased work of breathing or accessory muscle use. Speaking limited by presence of face mask. Per report, patient was intubated for brenda. 15min while in PACU. Found extubated with VS as following: /71 HR 69 RR22 O2 97% on face mask. Wife concerning regarding impending lap jose tomorrow (9/18) because "if he is having trouble waking up from smaller procedure how will he do tomorrow with a larger procedure"? Informed by ACS/Trauma that primary team had contacted cardiology for pre-op risk stratification prior to procedure. Bedside nurse reports to having seen cardiology examine the patient prior to ACS/Trauma team arrival. Team will follow.

## 2019-09-17 NOTE — CONSULT NOTE ADULT - PROBLEM SELECTOR RECOMMENDATION 9
Possible choledocholithiasis. MRCP ordered to r/o choledocholithiasis. Surgery Consult for eventual cholecystectomy. NPO. IVF. Admit.
AMS/acute respiratory failure post procedure    PLAN:  Patient seen in PACU, extubated, on NRB transitioned to 4L NC, tolerating well, no respiratory distress, A&Ox3.  Post op ams likely 2/2 anesthesia.  At this time, pt does not require ICU level of care. Would continue care as per primary team and surgery. Would recommend end tidal CO2 monitoring on 4 Paul if concerned for acute respiratory failure. Please reconsult as needed.

## 2019-09-17 NOTE — CONSULT NOTE ADULT - ASSESSMENT
Pt is a 67 y/o w/ a PMHx of HTN, HLD, sleep apnea (CPAP at home) who was transferred from [facility] after abdominal US revealed possible choledocholithiasis. Pt underwent MRCP yesterday, which revealed CBD dilation w/ multiple small stones, developing cholecystitis, and likely pancreatitis. Pt underwent ERCP today. MICU consult called post procedure due to inability to extubate pt because he was not waking up. On my arrival to pt's bedside, pt had woken up and been extubated, on non-rebreather mask. Switched to 4 L NC. Patient seen and examined in PACU, vitals/chart/medications reviewed case discussed with MICU attending Dr. Barney.  At this time patient does not require MICU admission, recommendations below.

## 2019-09-17 NOTE — CONSULT NOTE ADULT - ASSESSMENT
A/P:  69yo male with PMH Sleep apnea on Cpap, HTN, HLD, Gout transferred from [place] for ERCP and potential Cholecystectomy.  Patient evaluated s/p ERCP, which he had a difficult time being extubated and waking up from anesthesia, which he states he has had issues with in the past.  Follows closely with Cardio Dr. Ambrose. Per NP Freddy Medranorogelio patient had cardiac clearance 9/4/19 for upcoming rotator cuff repair, nml echo 2018 EF 60-65% and valvulopathy, nml EST 2018 8min at 9mets.  Patient has good functional capacity and denies any cardiac complaints.     1. Preop clearance  - Pre-operative cardiovascular risk evaluation and management. No cardiac symptoms. Non-ischemic ECG. METs > 4.  RCRI (revised cardiac risk index score) < 1%. Geiger perioperative cardiac risk score <1%.  Benefits of surgery outweigh the risk. No further cardiac work up is needed.  Further cardiac work up will not change risk of the patient.  No active chest pain, acute coronary syndrome, decompensated CHF, significant valvular abnormality, or unstable arrhythmia.  Patient is currently optimized from a cardiac standpoint therefore no absolute cardiac contraindication to proceeding with procedure. A/P:  67yo male with PMH Sleep apnea on Cpap, HTN, HLD, Gout transferred from [place] for ERCP and potential Cholecystectomy.  Patient evaluated s/p ERCP, which he had a difficult time being extubated and waking up from anesthesia, which he states he has had issues with in the past.  Follows closely with Cardio Dr. Ambrose. Per NP Freddy Medranorogelio patient had cardiac clearance 9/4/19 for upcoming rotator cuff repair, nml echo 2018 EF 60-65% and valvulopathy, nml EST 2018 8min at 9mets.  Patient has good functional capacity and denies any cardiac complaints.     1. Preop clearance  - Pre-operative cardiovascular risk evaluation and management. No cardiac symptoms. Non-ischemic ECG. METs > 4.  RCRI (revised cardiac risk index score) < 1%. Geiger perioperative cardiac risk score <1%.  Benefits of surgery outweigh the risk. No further cardiac work up is needed.  Further cardiac work up will not change risk of the patient.  No active chest pain, acute coronary syndrome, decompensated CHF, significant valvular abnormality, or unstable arrhythmia.  Patient is currently optimized from a cardiac standpoint therefore no absolute cardiac contraindication to proceeding with procedure.

## 2019-09-17 NOTE — CONSULT NOTE ADULT - ASSESSMENT
67 yo male with CHEL on nightly CPAP had difficulty in awakening after ERCP today  Eventually extubated successfully with no stridor or respiratory difficulty  Patient has biliary disease and cholecystectomy is contemplated    Patient had cardiac clearance for procedure    At this time, he is awake and alert  No dyspnea  Would definitely utilize nocturnal nasal CPAP at 9 cm for tonight and postoperative period    At this time, I do not see any pulmonary contraindication to proceeding with indicated surgery. Utilize nocturnal CPAP  May elect to extubate to BiPap initially post surgery as per anesthesiology.

## 2019-09-17 NOTE — PROGRESS NOTE ADULT - ASSESSMENT
67yo male transferred from [facility] for evaluation of possible choledocholithiasis. Surgery consulted for possible lap cholecystectomy this admission following biliary clearance.     - ERCP today   - recommend c/w IV ABx  - will require cardiology clearance for eventual lap jose

## 2019-09-18 ENCOUNTER — TRANSCRIPTION ENCOUNTER (OUTPATIENT)
Age: 69
End: 2019-09-18

## 2019-09-18 LAB
ALBUMIN SERPL ELPH-MCNC: 3.1 G/DL — LOW (ref 3.3–5.2)
ALBUMIN SERPL ELPH-MCNC: 3.4 G/DL — SIGNIFICANT CHANGE UP (ref 3.3–5.2)
ALP SERPL-CCNC: 127 U/L — HIGH (ref 40–120)
ALP SERPL-CCNC: 152 U/L — HIGH (ref 40–120)
ALT FLD-CCNC: 97 U/L — HIGH
ALT FLD-CCNC: 98 U/L — HIGH
ANION GAP SERPL CALC-SCNC: 13 MMOL/L — SIGNIFICANT CHANGE UP (ref 5–17)
ANION GAP SERPL CALC-SCNC: 16 MMOL/L — SIGNIFICANT CHANGE UP (ref 5–17)
AST SERPL-CCNC: 119 U/L — HIGH
AST SERPL-CCNC: 90 U/L — HIGH
BASOPHILS # BLD AUTO: 0 K/UL — SIGNIFICANT CHANGE UP (ref 0–0.2)
BASOPHILS NFR BLD AUTO: 0 % — SIGNIFICANT CHANGE UP (ref 0–2)
BILIRUB SERPL-MCNC: 3.5 MG/DL — HIGH (ref 0.4–2)
BILIRUB SERPL-MCNC: 4.6 MG/DL — HIGH (ref 0.4–2)
BUN SERPL-MCNC: 18 MG/DL — SIGNIFICANT CHANGE UP (ref 8–20)
BUN SERPL-MCNC: 22 MG/DL — HIGH (ref 8–20)
CALCIUM SERPL-MCNC: 8.9 MG/DL — SIGNIFICANT CHANGE UP (ref 8.6–10.2)
CALCIUM SERPL-MCNC: 9.2 MG/DL — SIGNIFICANT CHANGE UP (ref 8.6–10.2)
CHLORIDE SERPL-SCNC: 100 MMOL/L — SIGNIFICANT CHANGE UP (ref 98–107)
CHLORIDE SERPL-SCNC: 102 MMOL/L — SIGNIFICANT CHANGE UP (ref 98–107)
CO2 SERPL-SCNC: 23 MMOL/L — SIGNIFICANT CHANGE UP (ref 22–29)
CO2 SERPL-SCNC: 24 MMOL/L — SIGNIFICANT CHANGE UP (ref 22–29)
CREAT SERPL-MCNC: 0.64 MG/DL — SIGNIFICANT CHANGE UP (ref 0.5–1.3)
CREAT SERPL-MCNC: 1.1 MG/DL — SIGNIFICANT CHANGE UP (ref 0.5–1.3)
EOSINOPHIL # BLD AUTO: 0.01 K/UL — SIGNIFICANT CHANGE UP (ref 0–0.5)
EOSINOPHIL NFR BLD AUTO: 0.2 % — SIGNIFICANT CHANGE UP (ref 0–6)
GLUCOSE BLDC GLUCOMTR-MCNC: 98 MG/DL — SIGNIFICANT CHANGE UP (ref 70–99)
GLUCOSE SERPL-MCNC: 144 MG/DL — HIGH (ref 70–115)
GLUCOSE SERPL-MCNC: 98 MG/DL — SIGNIFICANT CHANGE UP (ref 70–115)
HCT VFR BLD CALC: 37.7 % — LOW (ref 39–50)
HCT VFR BLD CALC: 40.7 % — SIGNIFICANT CHANGE UP (ref 39–50)
HGB BLD-MCNC: 12.7 G/DL — LOW (ref 13–17)
HGB BLD-MCNC: 13.7 G/DL — SIGNIFICANT CHANGE UP (ref 13–17)
IMM GRANULOCYTES NFR BLD AUTO: 0.8 % — SIGNIFICANT CHANGE UP (ref 0–1.5)
INR BLD: 1.25 RATIO — HIGH (ref 0.88–1.16)
LACTATE BLDV-MCNC: 3.9 MMOL/L — HIGH (ref 0.5–2)
LYMPHOCYTES # BLD AUTO: 0.62 K/UL — LOW (ref 1–3.3)
LYMPHOCYTES # BLD AUTO: 12.1 % — LOW (ref 13–44)
MCHC RBC-ENTMCNC: 30.6 PG — SIGNIFICANT CHANGE UP (ref 27–34)
MCHC RBC-ENTMCNC: 30.6 PG — SIGNIFICANT CHANGE UP (ref 27–34)
MCHC RBC-ENTMCNC: 33.7 GM/DL — SIGNIFICANT CHANGE UP (ref 32–36)
MCHC RBC-ENTMCNC: 33.7 GM/DL — SIGNIFICANT CHANGE UP (ref 32–36)
MCV RBC AUTO: 90.8 FL — SIGNIFICANT CHANGE UP (ref 80–100)
MCV RBC AUTO: 90.8 FL — SIGNIFICANT CHANGE UP (ref 80–100)
MONOCYTES # BLD AUTO: 0.15 K/UL — SIGNIFICANT CHANGE UP (ref 0–0.9)
MONOCYTES NFR BLD AUTO: 2.9 % — SIGNIFICANT CHANGE UP (ref 2–14)
NEUTROPHILS # BLD AUTO: 4.31 K/UL — SIGNIFICANT CHANGE UP (ref 1.8–7.4)
NEUTROPHILS NFR BLD AUTO: 84 % — HIGH (ref 43–77)
PLATELET # BLD AUTO: 153 K/UL — SIGNIFICANT CHANGE UP (ref 150–400)
PLATELET # BLD AUTO: 211 K/UL — SIGNIFICANT CHANGE UP (ref 150–400)
POTASSIUM SERPL-MCNC: 3.6 MMOL/L — SIGNIFICANT CHANGE UP (ref 3.5–5.3)
POTASSIUM SERPL-MCNC: 4.2 MMOL/L — SIGNIFICANT CHANGE UP (ref 3.5–5.3)
POTASSIUM SERPL-SCNC: 3.6 MMOL/L — SIGNIFICANT CHANGE UP (ref 3.5–5.3)
POTASSIUM SERPL-SCNC: 4.2 MMOL/L — SIGNIFICANT CHANGE UP (ref 3.5–5.3)
PROT SERPL-MCNC: 6.3 G/DL — LOW (ref 6.6–8.7)
PROT SERPL-MCNC: 7 G/DL — SIGNIFICANT CHANGE UP (ref 6.6–8.7)
PROTHROM AB SERPL-ACNC: 14.5 SEC — HIGH (ref 10–12.9)
RBC # BLD: 4.15 M/UL — LOW (ref 4.2–5.8)
RBC # BLD: 4.48 M/UL — SIGNIFICANT CHANGE UP (ref 4.2–5.8)
RBC # FLD: 14.2 % — SIGNIFICANT CHANGE UP (ref 10.3–14.5)
RBC # FLD: 14.2 % — SIGNIFICANT CHANGE UP (ref 10.3–14.5)
SODIUM SERPL-SCNC: 138 MMOL/L — SIGNIFICANT CHANGE UP (ref 135–145)
SODIUM SERPL-SCNC: 140 MMOL/L — SIGNIFICANT CHANGE UP (ref 135–145)
WBC # BLD: 11.18 K/UL — HIGH (ref 3.8–10.5)
WBC # BLD: 5.13 K/UL — SIGNIFICANT CHANGE UP (ref 3.8–10.5)
WBC # FLD AUTO: 11.18 K/UL — HIGH (ref 3.8–10.5)
WBC # FLD AUTO: 5.13 K/UL — SIGNIFICANT CHANGE UP (ref 3.8–10.5)

## 2019-09-18 PROCEDURE — 99232 SBSQ HOSP IP/OBS MODERATE 35: CPT | Mod: 57

## 2019-09-18 PROCEDURE — 99232 SBSQ HOSP IP/OBS MODERATE 35: CPT

## 2019-09-18 PROCEDURE — 99233 SBSQ HOSP IP/OBS HIGH 50: CPT

## 2019-09-18 RX ORDER — VANCOMYCIN HCL 1 G
VIAL (EA) INTRAVENOUS
Refills: 0 | Status: DISCONTINUED | OUTPATIENT
Start: 2019-09-18 | End: 2019-09-18

## 2019-09-18 RX ORDER — VANCOMYCIN HCL 1 G
VIAL (EA) INTRAVENOUS
Refills: 0 | Status: DISCONTINUED | OUTPATIENT
Start: 2019-09-18 | End: 2019-09-19

## 2019-09-18 RX ORDER — VANCOMYCIN HCL 1 G
1000 VIAL (EA) INTRAVENOUS EVERY 8 HOURS
Refills: 0 | Status: DISCONTINUED | OUTPATIENT
Start: 2019-09-19 | End: 2019-09-19

## 2019-09-18 RX ORDER — PIPERACILLIN AND TAZOBACTAM 4; .5 G/20ML; G/20ML
3.38 INJECTION, POWDER, LYOPHILIZED, FOR SOLUTION INTRAVENOUS ONCE
Refills: 0 | Status: DISCONTINUED | OUTPATIENT
Start: 2019-09-18 | End: 2019-09-19

## 2019-09-18 RX ORDER — ENOXAPARIN SODIUM 100 MG/ML
40 INJECTION SUBCUTANEOUS ONCE
Refills: 0 | Status: COMPLETED | OUTPATIENT
Start: 2019-09-18 | End: 2019-09-18

## 2019-09-18 RX ORDER — PIPERACILLIN AND TAZOBACTAM 4; .5 G/20ML; G/20ML
3.38 INJECTION, POWDER, LYOPHILIZED, FOR SOLUTION INTRAVENOUS EVERY 6 HOURS
Refills: 0 | Status: DISCONTINUED | OUTPATIENT
Start: 2019-09-18 | End: 2019-09-19

## 2019-09-18 RX ORDER — ACETAMINOPHEN 500 MG
1000 TABLET ORAL ONCE
Refills: 0 | Status: COMPLETED | OUTPATIENT
Start: 2019-09-18 | End: 2019-09-18

## 2019-09-18 RX ORDER — SODIUM CHLORIDE 9 MG/ML
500 INJECTION INTRAMUSCULAR; INTRAVENOUS; SUBCUTANEOUS ONCE
Refills: 0 | Status: COMPLETED | OUTPATIENT
Start: 2019-09-18 | End: 2019-09-18

## 2019-09-18 RX ORDER — VANCOMYCIN HCL 1 G
1000 VIAL (EA) INTRAVENOUS ONCE
Refills: 0 | Status: COMPLETED | OUTPATIENT
Start: 2019-09-18 | End: 2019-09-18

## 2019-09-18 RX ADMIN — SODIUM CHLORIDE 500 MILLILITER(S): 9 INJECTION INTRAMUSCULAR; INTRAVENOUS; SUBCUTANEOUS at 21:47

## 2019-09-18 RX ADMIN — ATORVASTATIN CALCIUM 10 MILLIGRAM(S): 80 TABLET, FILM COATED ORAL at 21:51

## 2019-09-18 RX ADMIN — Medication 1000 MILLIGRAM(S): at 21:10

## 2019-09-18 RX ADMIN — Medication 50 MILLIGRAM(S): at 09:30

## 2019-09-18 RX ADMIN — Medication 1 PACKET(S): at 22:57

## 2019-09-18 RX ADMIN — PIPERACILLIN AND TAZOBACTAM 25 GRAM(S): 4; .5 INJECTION, POWDER, LYOPHILIZED, FOR SOLUTION INTRAVENOUS at 09:30

## 2019-09-18 RX ADMIN — ENOXAPARIN SODIUM 40 MILLIGRAM(S): 100 INJECTION SUBCUTANEOUS at 16:16

## 2019-09-18 RX ADMIN — Medication 1 TABLET(S): at 11:11

## 2019-09-18 RX ADMIN — PIPERACILLIN AND TAZOBACTAM 25 GRAM(S): 4; .5 INJECTION, POWDER, LYOPHILIZED, FOR SOLUTION INTRAVENOUS at 17:38

## 2019-09-18 RX ADMIN — Medication 250 MILLIGRAM(S): at 20:24

## 2019-09-18 RX ADMIN — Medication 25 MILLIGRAM(S): at 06:24

## 2019-09-18 RX ADMIN — PANTOPRAZOLE SODIUM 40 MILLIGRAM(S): 20 TABLET, DELAYED RELEASE ORAL at 06:24

## 2019-09-18 RX ADMIN — Medication 1 PACKET(S): at 13:54

## 2019-09-18 RX ADMIN — PIPERACILLIN AND TAZOBACTAM 25 GRAM(S): 4; .5 INJECTION, POWDER, LYOPHILIZED, FOR SOLUTION INTRAVENOUS at 03:41

## 2019-09-18 RX ADMIN — Medication 400 MILLIGRAM(S): at 20:10

## 2019-09-18 RX ADMIN — Medication 100 MILLIGRAM(S): at 11:11

## 2019-09-18 NOTE — SEPSIS NOTE - ASSESSMENT
69 yo male obese with CHEL on nocturnal CIPAP post ERCP 9/17/19 c/o fever (102.3), chills, tachycardia HR 93, and HTN /96. Rapid response called , later upgraded to sepsis. Patient is a 68 year old male transfered from [facility] for cholelithiasis and  m CBD dilation r/o CBD stone for ERCP. He was seen by GI , spiked fever  on the night of admission ,started on empirical iv zosyn . day 2 had MRCP positive for CBD dilation , possible cholecytstitis , he was evaluated by surgery team, s/p ERCP 9/17 stone extracted 69 yo male obese with CHEL on nocturnal CIPAP post ERCP 9/17/19 c/o fever (102.3), chills, tachycardia HR 93, and HTN /96. Rapid response called , later upgraded to sepsis. Patient was transfered from [facility] for cholelithiasis and CBD dilation r/o CBD stone for ERCP. He was seen by GI , spiked fever  on the night of admission ,started on empirical IV Zosyn, day 2 post MRCP positive for CBD dilation , possible cholecytstitis , he was evaluated by surgery team, s/p ERCP 9/17 stone extracted    #Fever  -Tylenol 1000mg IVP once  -Zosyn 3.375mg IV once  -Lactate, CBC, CMP,UA, PT/PTT/INR, Blood cultures  -No IV fluids pending lactate result  -Vanco 1gm IVPB every 8hr    # Tachycardia: resolved w/o intervention. Stable    #Hypertension: Resolved     Treatment planiscussed with Dr Farfan

## 2019-09-18 NOTE — SEPSIS NOTE - ADDITIONAL PE
Abdomen: nontender, soft, non-distended. Active bowel sounds  Extremities: No pedal edema, no calf tenderness

## 2019-09-18 NOTE — CHART NOTE - NSCHARTNOTEFT_GEN_A_CORE
Post Sepsis Re-evaluation Note    CC: Sepsis  HPI:  67 yo male obese with CHEL on nocturnal CIPAP post ERCP 9/17/19, s/p sepsis.   Patient seen and examined by the bedside. Appears comfortable lying in bed with   C-PAP device on. In no acute distress with unlabored breathing. Patient denies SOB, chills/rigors,   chest pain, nausea, vomiting, abdominal pain, headache, or visual changes.    ICU Vital Signs Last 24 Hrs  T(C): 36.4 (19 Sep 2019 00:20), Max: 39.1 (18 Sep 2019 19:43)  T(F): 97.5 (19 Sep 2019 00:20), Max: 102.3 (18 Sep 2019 19:43)  HR: 73 (19 Sep 2019 00:20) (57 - 93)  BP: 111/64 (19 Sep 2019 00:20) (103/67 - 200/96)  BP(mean): 130 (18 Sep 2019 19:43) (81 - 130)  ABP: --  ABP(mean): --  RR: 18 (19 Sep 2019 00:20) (14 - 28)  SpO2: 98% (19 Sep 2019 00:20) (91% - 98%)      LABS:             13.7   5.13  )-----------( 211      ( 18 Sep 2019 20:28 )             40.7     09-18    140  |  100  |  22.0<H>  ----------------------------<  98  3.6   |  24.0  |  1.10    Ca    9.2      18 Sep 2019 20:28    TPro  7.0  /  Alb  3.4  /  TBili  3.5<H>  /  DBili  x   /  AST  90<H>  /  ALT  98<H>  /  AlkPhos  152<H>  09-18    Lactate, Blood: 2.4: TYPE:(C=Critical, N=Notification, A=Abnormal) N    Urinalysis (09.15.19 @ 09:52)    pH Urine: 5.0    Glucose Qualitative, Urine: Negative mg/dL    Blood, Urine: Small    Color: Yellow    Urine Appearance: Clear    Bilirubin: Negative    Ketone - Urine: Trace    Specific Gravity: 1.025    Protein, Urine: Negative mg/dL    Urobilinogen: Negative mg/dL    Nitrite: Negative    Leukocyte Esterase Concentration: Negative    Blood Cultures pending    MEDICATIONS  (STANDING):  allopurinol 100 milliGRAM(s) Oral daily  atorvastatin 10 milliGRAM(s) Oral at bedtime  enoxaparin Injectable 40 milliGRAM(s) SubCutaneous once  hydrochlorothiazide 25 milliGRAM(s) Oral daily  indomethacin Suppository 100 milliGRAM(s) Rectal once  lactobacillus acidophilus 1 Tablet(s) Oral daily  metoprolol succinate ER 50 milliGRAM(s) Oral daily  pantoprazole    Tablet 40 milliGRAM(s) Oral before breakfast  piperacillin/tazobactam IVPB. 3.375 Gram(s) IV Intermittent once  piperacillin/tazobactam IVPB.. 3.375 Gram(s) IV Intermittent every 6 hours  piperacillin/tazobactam IVPB.. 3.375 Gram(s) IV Intermittent every 8 hours  potassium phosphate / sodium phosphate powder 1 Packet(s) Oral three times a day  vancomycin  IVPB      vancomycin  IVPB 1000 milliGRAM(s) IV Intermittent every 8 hours    MEDICATIONS  (PRN):  acetaminophen   Tablet .. 650 milliGRAM(s) Oral every 6 hours PRN Temp greater or equal to 38C (100.4F)  ibuprofen  Tablet. 400 milliGRAM(s) Oral every 6 hours PRN Temp greater or equal to 38.5C (101.3F)  morphine  - Injectable 2 milliGRAM(s) IV Push every 4 hours PRN Moderate Pain (4 - 6)  ondansetron Injectable 4 milliGRAM(s) IV Push every 6 hours PRN Nausea and/or Vomiting    ASSESSMENT/PLAN  67 yo male obese with CHEL on nocturnal CIPAP post ERCP 9/17/19, s/p sepsis with /76.   on re-evaluation 2hrs post sepsis work-up/management.     #Fever/Chills/Rigors:   Patient improved with response to sepsis management. /65, HR81, RR18, T100.5, O2Sat 93% on supplemental O2 3LNC. UA neg. Lactate 2.4 improved.  No recurrence of earlier mentioned symptoms. Pt symptoms likely secondary to Bacteremia. Fever improved and trending down.   UA neg. Denieschills/rigors.  -Cont antibiotics  -F/U blood cultures    #HTN: resolved, /65  -NS 500ml bolus due to large decline in [id number] --> 107.    #Tachycardia: Resolved> Cont to monitor    Will continue to monitor.

## 2019-09-18 NOTE — PHARMACY COMMUNICATION NOTE - PROVIDER CONTACTED
called FRANCISCO Jackson  , placed message, waiting for call back, order suspended as patient is on zosyn since 9/16/19 , RN is aware.

## 2019-09-18 NOTE — PROGRESS NOTE ADULT - PROBLEM SELECTOR PLAN 1
resolved s/p ERCP with sphincterotomy and clearrance of duct. Nothing further to suggest from GI standpoint other than cholcystectomy. Will see only prn your request.
Possible choledocholithiasis. MRCP ordered to r/o choledocholithiasis. Surgery Consult for eventual cholecystectomy.  NPO.  IVF.

## 2019-09-18 NOTE — SEPSIS NOTE - NSSUBJFT_GEN_A_CORE
CC: Fever, chills, Tachycardia, Hypertension    67 yo male obese with CHEL on nocturnal CIPAP post ERCP 9/17/19, now with fever (102.3), chills with rigor, tachycardia HR 93, and HTN /96.   Rapid response called, later elevated to sepsis.

## 2019-09-18 NOTE — SEPSIS NOTE - RS GEN PE MLT RESP DETAILS PC
good air movement/no rhonchi/clear to auscultation bilaterally/respirations non-labored/no intercostal retractions/no rales/no wheezes/airway patent

## 2019-09-18 NOTE — PROGRESS NOTE ADULT - SUBJECTIVE AND OBJECTIVE BOX
INTERVAL HPI/OVERNIGHT EVENTS:    SUBJECTIVE: Patient with delayed extubation following ERCP and brought to ICU for further monitoring. No acute events over night. Patient seen at bedside and found in no acute distress. Patient denies pain at present. Denies n/v, sob, chest pain, fevers/chills.       MEDICATIONS  (STANDING):  allopurinol 100 milliGRAM(s) Oral daily  atorvastatin 10 milliGRAM(s) Oral at bedtime  enoxaparin Injectable 40 milliGRAM(s) SubCutaneous once  hydrochlorothiazide 25 milliGRAM(s) Oral daily  indomethacin Suppository 100 milliGRAM(s) Rectal once  lactated ringers. 1000 milliLiter(s) (150 mL/Hr) IV Continuous <Continuous>  lactobacillus acidophilus 1 Tablet(s) Oral daily  metoprolol succinate ER 50 milliGRAM(s) Oral daily  pantoprazole    Tablet 40 milliGRAM(s) Oral before breakfast  piperacillin/tazobactam IVPB.. 3.375 Gram(s) IV Intermittent every 8 hours  potassium phosphate / sodium phosphate powder 1 Packet(s) Oral three times a day    MEDICATIONS  (PRN):  acetaminophen   Tablet .. 650 milliGRAM(s) Oral every 6 hours PRN Temp greater or equal to 38C (100.4F)  ibuprofen  Tablet. 400 milliGRAM(s) Oral every 6 hours PRN Temp greater or equal to 38.5C (101.3F)  morphine  - Injectable 2 milliGRAM(s) IV Push every 4 hours PRN Moderate Pain (4 - 6)  ondansetron Injectable 4 milliGRAM(s) IV Push every 6 hours PRN Nausea and/or Vomiting      Vital Signs Last 24 Hrs  T(C): 36.7 (18 Sep 2019 04:44), Max: 38.6 (17 Sep 2019 08:18)  T(F): 98.1 (18 Sep 2019 04:44), Max: 101.4 (17 Sep 2019 08:18)  HR: 58 (18 Sep 2019 05:00) (56 - 89)  BP: 125/75 (18 Sep 2019 05:00) (100/59 - 165/70)  BP(mean): 95 (18 Sep 2019 05:00) (82 - 110)  RR: 16 (18 Sep 2019 05:00) (12 - 33)  SpO2: 91% (18 Sep 2019 05:00) (89% - 100%)    PE  Gen: patient resting comfortably in bed. no acute distress  Pulm: on bipap machine. no increased wob or use of accessory muscles. patient on bipap at baseline while sleeping  Abd: obese, soft, non-tender, no palpable masses. no discolorations    I&O's Detail    17 Sep 2019 07:01  -  18 Sep 2019 05:23  --------------------------------------------------------  IN:    lactated ringers.: 1500 mL    Oral Fluid: 600 mL    Solution: 150 mL  Total IN: 2250 mL    OUT:    Voided: 1990 mL  Total OUT: 1990 mL    Total NET: 260 mL          LABS:                        12.7   11.18 )-----------( 153      ( 18 Sep 2019 04:01 )             37.7     09-18    138  |  102  |  18.0  ----------------------------<  144<H>  4.2   |  23.0  |  0.64    Ca    8.9      18 Sep 2019 04:01  Phos  1.7     09-16    TPro  6.3<L>  /  Alb  3.1<L>  /  TBili  4.6<H>  /  DBili  x   /  AST  119<H>  /  ALT  97<H>  /  AlkPhos  127<H>  09-18

## 2019-09-18 NOTE — PROGRESS NOTE ADULT - SUBJECTIVE AND OBJECTIVE BOX
Internal Medicine Hospitalist Progress Note    follow up for gallstone , fever , CBD stone/ cholecystitis    seen twice today in am and now   feels better , he is anxious upset taht he did not have surgery today   surgery team visited him earlier explained to him , no room avaliable in the OR   he denies pain , no nausea , comfortable , no distress       Vital Signs Last 24 Hrs  T(C): 37.4 (18 Sep 2019 16:30), Max: 37.4 (18 Sep 2019 16:30)  T(F): 99.4 (18 Sep 2019 16:30), Max: 99.4 (18 Sep 2019 16:30)  HR: 70 (18 Sep 2019 16:00) (56 - 74)  BP: 116/67 (18 Sep 2019 16:00) (116/62 - 165/70)  BP(mean): 87 (18 Sep 2019 16:00) (81 - 110)  RR: 18 (18 Sep 2019 16:00) (12 - 33)  SpO2: 93% (18 Sep 2019 16:00) (89% - 97%)  PHYSICAL EXAM:      Constitutional: awake alert no distress     Neck: supple, no JVD     Respiratory: CTA bilateral , decreased BS on the right bases    Cardiovascular: regular s1 /s2     Gastrointestinal: soft , no  tenderness in RUQ     Extremities: no pretibial edema                                      12.7   11.18 )-----------( 153      ( 18 Sep 2019 04:01 )             37.7   09-18    138  |  102  |  18.0  ----------------------------<  144<H>  4.2   |  23.0  |  0.64    Ca    8.9      18 Sep 2019 04:01  Phos  1.7     09-16    TPro  6.3<L>  /  Alb  3.1<L>  /  TBili  4.6<H>  /  DBili  x   /  AST  119<H>  /  ALT  97<H>  /  AlkPhos  127<H>  09-18    TPro  6.4<L>  /  Alb  3.5  /  TBili  3.4<H>  /  DBili  0.3  /  AST  74<H>  /  ALT  62<H>  /  AlkPhos  70  09-16

## 2019-09-18 NOTE — PROGRESS NOTE ADULT - ASSESSMENT
69 yo male obese with CHEL on nocturnal CIPAP transfered from [facility] for cholelithiasis and  m CBD dilation r/o CBD stone for ERCP. He was seen by GI , spiked fever  on the night of admission , started on empirical iv zosyn  . day 2 had MRCP positive for CBD dilation , possible cholecytstitis , he was evaluated by surgery team ,.s/p ERCP 9/17 stone extracted during procedure recovery he did not wake up per anestesiologist had to be intubated sedated , eventually became awake and succesfully extubated , seen by cardiology and pulmonary preop optimized   he was monitored in SDU/ICU setting overnight , fever improved  , cholectectomy per surgery tomorrow     1- Choledocholithiasis with cholecystitis  s/p ERCP CBD stone extracted   surgery follow up for cholecytectomy likely tomorrow   continue iv zosyn , pain meds as needed   NPO after MN     2- CHEL on CIPAP at night     3- Hypophosphatemia   replaced   repeat lytes in am   4- HTN - controlled cont current medication     pt is medically optimized for surgery at present

## 2019-09-18 NOTE — PROGRESS NOTE ADULT - ASSESSMENT
A/P: 67yo male transferred from [facility] for evaluation of possible choledocholithiasis. Surgery consulted for possible lap cholecystectomy this admission following biliary clearance.     - plan for lap jose this admission  -will need cariology clearance preop  - recommend c/w IV ABx

## 2019-09-18 NOTE — PROGRESS NOTE ADULT - SUBJECTIVE AND OBJECTIVE BOX
Pt seen and examined f/u for cholecystitis and CGD stones    Yesterday patient underwent ERCP with 12mm sphincterotomy and removed of CBD stones with clearance of duct. Post ERCP it took longer than usual for patient to be extubated. Nevertheless he was extubated and this AM just notes some residual upper abdominal soreness but no significant pain, nausea or vomiting.. LFTs up slightly today which could be due tp some ampullary edema and gallbladder inflammation. He is angry about anestheisa yesterday yet he still wants the same anesthiologist for the cholcystectomy.    REVIEW OF SYSTEMS:    CONSTITUTIONAL: No fever, weight loss, or fatigue  EYES: No eye pain, visual disturbances, or discharge  ENMT:  No difficulty hearing, tinnitus, vertigo; No sinus or throat pain  RESPIRATORY: No cough, wheezing, chills or hemoptysis; No shortness of breath  CARDIOVASCULAR: No chest pain, palpitations, dizziness, or leg swelling  GASTROINTESTINAL: as above    MEDICATIONS:  MEDICATIONS  (STANDING):  allopurinol 100 milliGRAM(s) Oral daily  atorvastatin 10 milliGRAM(s) Oral at bedtime  enoxaparin Injectable 40 milliGRAM(s) SubCutaneous once  hydrochlorothiazide 25 milliGRAM(s) Oral daily  indomethacin Suppository 100 milliGRAM(s) Rectal once  lactated ringers. 1000 milliLiter(s) (150 mL/Hr) IV Continuous <Continuous>  lactobacillus acidophilus 1 Tablet(s) Oral daily  metoprolol succinate ER 50 milliGRAM(s) Oral daily  pantoprazole    Tablet 40 milliGRAM(s) Oral before breakfast  piperacillin/tazobactam IVPB.. 3.375 Gram(s) IV Intermittent every 8 hours  potassium phosphate / sodium phosphate powder 1 Packet(s) Oral three times a day    MEDICATIONS  (PRN):  acetaminophen   Tablet .. 650 milliGRAM(s) Oral every 6 hours PRN Temp greater or equal to 38C (100.4F)  ibuprofen  Tablet. 400 milliGRAM(s) Oral every 6 hours PRN Temp greater or equal to 38.5C (101.3F)  morphine  - Injectable 2 milliGRAM(s) IV Push every 4 hours PRN Moderate Pain (4 - 6)  ondansetron Injectable 4 milliGRAM(s) IV Push every 6 hours PRN Nausea and/or Vomiting      Allergies    No Known Allergies    Intolerances        Vital Signs Last 24 Hrs  T(C): 36.7 (18 Sep 2019 07:11), Max: 37 (18 Sep 2019 00:17)  T(F): 98.1 (18 Sep 2019 07:11), Max: 98.6 (18 Sep 2019 00:17)  HR: 58 (18 Sep 2019 07:11) (56 - 88)  BP: 135/72 (18 Sep 2019 07:11) (100/59 - 165/70)  BP(mean): 102 (18 Sep 2019 06:00) (82 - 110)  RR: 22 (18 Sep 2019 07:11) (12 - 33)  SpO2: 93% (18 Sep 2019 07:11) (89% - 100%)    09-17 @ 07:01  -  09-18 @ 07:00  --------------------------------------------------------  IN: 2600 mL / OUT: 1990 mL / NET: 610 mL        PHYSICAL EXAM:    General: obese male; in no acute distress  HEENT: MMM, conjunctiva and sclera clear  Gastrointestinal:Abdomen: Soft non-tender non-distended but somewhat protuberant. Normal bowel sounds; No hepatosplenomegaly  Extremities: no cyanosis, clubbing or edema.  Skin: Warm and dry. No obvious rash    LABS:      CBC Full  -  ( 18 Sep 2019 04:01 )  WBC Count : 11.18 K/uL  RBC Count : 4.15 M/uL  Hemoglobin : 12.7 g/dL  Hematocrit : 37.7 %  Platelet Count - Automated : 153 K/uL  Mean Cell Volume : 90.8 fl  Mean Cell Hemoglobin : 30.6 pg  Mean Cell Hemoglobin Concentration : 33.7 gm/dL  Auto Neutrophil # : x  Auto Lymphocyte # : x  Auto Monocyte # : x  Auto Eosinophil # : x  Auto Basophil # : x  Auto Neutrophil % : x  Auto Lymphocyte % : x  Auto Monocyte % : x  Auto Eosinophil % : x  Auto Basophil % : x    09-18    138  |  102  |  18.0  ----------------------------<  144<H>  4.2   |  23.0  |  0.64    Ca    8.9      18 Sep 2019 04:01  Phos  1.7     09-16    TPro  6.3<L>  /  Alb  3.1<L>  /  TBili  4.6<H>  /  DBili  x   /  AST  119<H>  /  ALT  97<H>  /  AlkPhos  127<H>  09-18

## 2019-09-19 ENCOUNTER — RESULT REVIEW (OUTPATIENT)
Age: 69
End: 2019-09-19

## 2019-09-19 LAB
ALBUMIN SERPL ELPH-MCNC: 3.1 G/DL — LOW (ref 3.3–5.2)
ALP SERPL-CCNC: 134 U/L — HIGH (ref 40–120)
ALT FLD-CCNC: 76 U/L — HIGH
ANION GAP SERPL CALC-SCNC: 14 MMOL/L — SIGNIFICANT CHANGE UP (ref 5–17)
APPEARANCE UR: CLEAR — SIGNIFICANT CHANGE UP
AST SERPL-CCNC: 54 U/L — HIGH
BASOPHILS # BLD AUTO: 0.02 K/UL — SIGNIFICANT CHANGE UP (ref 0–0.2)
BASOPHILS NFR BLD AUTO: 0.2 % — SIGNIFICANT CHANGE UP (ref 0–2)
BILIRUB DIRECT SERPL-MCNC: 0.7 MG/DL — HIGH (ref 0–0.3)
BILIRUB INDIRECT FLD-MCNC: 1.9 MG/DL — HIGH (ref 0.2–1)
BILIRUB SERPL-MCNC: 2.6 MG/DL — HIGH (ref 0.4–2)
BILIRUB UR-MCNC: NEGATIVE — SIGNIFICANT CHANGE UP
BLD GP AB SCN SERPL QL: SIGNIFICANT CHANGE UP
BUN SERPL-MCNC: 21 MG/DL — HIGH (ref 8–20)
CALCIUM SERPL-MCNC: 8.7 MG/DL — SIGNIFICANT CHANGE UP (ref 8.6–10.2)
CHLORIDE SERPL-SCNC: 103 MMOL/L — SIGNIFICANT CHANGE UP (ref 98–107)
CO2 SERPL-SCNC: 26 MMOL/L — SIGNIFICANT CHANGE UP (ref 22–29)
COLOR SPEC: YELLOW — SIGNIFICANT CHANGE UP
CREAT SERPL-MCNC: 1.05 MG/DL — SIGNIFICANT CHANGE UP (ref 0.5–1.3)
DIFF PNL FLD: ABNORMAL
EOSINOPHIL # BLD AUTO: 0.06 K/UL — SIGNIFICANT CHANGE UP (ref 0–0.5)
EOSINOPHIL NFR BLD AUTO: 0.5 % — SIGNIFICANT CHANGE UP (ref 0–6)
EPI CELLS # UR: SIGNIFICANT CHANGE UP
GLUCOSE SERPL-MCNC: 112 MG/DL — SIGNIFICANT CHANGE UP (ref 70–115)
GLUCOSE UR QL: NEGATIVE MG/DL — SIGNIFICANT CHANGE UP
HCT VFR BLD CALC: 36.5 % — LOW (ref 39–50)
HGB BLD-MCNC: 12.3 G/DL — LOW (ref 13–17)
IMM GRANULOCYTES NFR BLD AUTO: 0.9 % — SIGNIFICANT CHANGE UP (ref 0–1.5)
INR BLD: 1.24 RATIO — HIGH (ref 0.88–1.16)
KETONES UR-MCNC: NEGATIVE — SIGNIFICANT CHANGE UP
LACTATE BLDV-MCNC: 1.2 MMOL/L — SIGNIFICANT CHANGE UP (ref 0.5–2)
LACTATE SERPL-SCNC: 2.4 MMOL/L — HIGH (ref 0.5–2)
LEUKOCYTE ESTERASE UR-ACNC: NEGATIVE — SIGNIFICANT CHANGE UP
LYMPHOCYTES # BLD AUTO: 1.36 K/UL — SIGNIFICANT CHANGE UP (ref 1–3.3)
LYMPHOCYTES # BLD AUTO: 11.7 % — LOW (ref 13–44)
MAGNESIUM SERPL-MCNC: 1.9 MG/DL — SIGNIFICANT CHANGE UP (ref 1.6–2.6)
MCHC RBC-ENTMCNC: 30.5 PG — SIGNIFICANT CHANGE UP (ref 27–34)
MCHC RBC-ENTMCNC: 33.7 GM/DL — SIGNIFICANT CHANGE UP (ref 32–36)
MCV RBC AUTO: 90.6 FL — SIGNIFICANT CHANGE UP (ref 80–100)
MONOCYTES # BLD AUTO: 0.84 K/UL — SIGNIFICANT CHANGE UP (ref 0–0.9)
MONOCYTES NFR BLD AUTO: 7.3 % — SIGNIFICANT CHANGE UP (ref 2–14)
NEUTROPHILS # BLD AUTO: 9.2 K/UL — HIGH (ref 1.8–7.4)
NEUTROPHILS NFR BLD AUTO: 79.4 % — HIGH (ref 43–77)
NITRITE UR-MCNC: NEGATIVE — SIGNIFICANT CHANGE UP
PH UR: 6.5 — SIGNIFICANT CHANGE UP (ref 5–8)
PHOSPHATE SERPL-MCNC: 3.5 MG/DL — SIGNIFICANT CHANGE UP (ref 2.4–4.7)
PLATELET # BLD AUTO: 198 K/UL — SIGNIFICANT CHANGE UP (ref 150–400)
POTASSIUM SERPL-MCNC: 3.9 MMOL/L — SIGNIFICANT CHANGE UP (ref 3.5–5.3)
POTASSIUM SERPL-SCNC: 3.9 MMOL/L — SIGNIFICANT CHANGE UP (ref 3.5–5.3)
PROT SERPL-MCNC: 6.5 G/DL — LOW (ref 6.6–8.7)
PROT UR-MCNC: 30 MG/DL
PROTHROM AB SERPL-ACNC: 14.4 SEC — HIGH (ref 10–12.9)
RBC # BLD: 4.03 M/UL — LOW (ref 4.2–5.8)
RBC # FLD: 14.6 % — HIGH (ref 10.3–14.5)
RBC CASTS # UR COMP ASSIST: SIGNIFICANT CHANGE UP /HPF (ref 0–4)
SODIUM SERPL-SCNC: 143 MMOL/L — SIGNIFICANT CHANGE UP (ref 135–145)
SP GR SPEC: 1.01 — SIGNIFICANT CHANGE UP (ref 1.01–1.02)
UROBILINOGEN FLD QL: 8 MG/DL
WBC # BLD: 11.58 K/UL — HIGH (ref 3.8–10.5)
WBC # FLD AUTO: 11.58 K/UL — HIGH (ref 3.8–10.5)
WBC UR QL: NEGATIVE — SIGNIFICANT CHANGE UP

## 2019-09-19 PROCEDURE — 71045 X-RAY EXAM CHEST 1 VIEW: CPT | Mod: 26

## 2019-09-19 PROCEDURE — 47562 LAPAROSCOPIC CHOLECYSTECTOMY: CPT | Mod: 22

## 2019-09-19 PROCEDURE — 99232 SBSQ HOSP IP/OBS MODERATE 35: CPT

## 2019-09-19 PROCEDURE — 88304 TISSUE EXAM BY PATHOLOGIST: CPT | Mod: 26

## 2019-09-19 RX ORDER — VANCOMYCIN HCL 1 G
1000 VIAL (EA) INTRAVENOUS EVERY 12 HOURS
Refills: 0 | Status: DISCONTINUED | OUTPATIENT
Start: 2019-09-19 | End: 2019-09-20

## 2019-09-19 RX ORDER — IBUPROFEN 200 MG
400 TABLET ORAL EVERY 6 HOURS
Refills: 0 | Status: DISCONTINUED | OUTPATIENT
Start: 2019-09-19 | End: 2019-09-19

## 2019-09-19 RX ORDER — ACETAMINOPHEN 500 MG
650 TABLET ORAL EVERY 6 HOURS
Refills: 0 | Status: DISCONTINUED | OUTPATIENT
Start: 2019-09-19 | End: 2019-09-19

## 2019-09-19 RX ORDER — SODIUM,POTASSIUM PHOSPHATES 278-250MG
1 POWDER IN PACKET (EA) ORAL THREE TIMES A DAY
Refills: 0 | Status: DISCONTINUED | OUTPATIENT
Start: 2019-09-19 | End: 2019-09-20

## 2019-09-19 RX ORDER — ENOXAPARIN SODIUM 100 MG/ML
40 INJECTION SUBCUTANEOUS ONCE
Refills: 0 | Status: DISCONTINUED | OUTPATIENT
Start: 2019-09-19 | End: 2019-09-19

## 2019-09-19 RX ORDER — ONDANSETRON 8 MG/1
4 TABLET, FILM COATED ORAL ONCE
Refills: 0 | Status: DISCONTINUED | OUTPATIENT
Start: 2019-09-19 | End: 2019-09-19

## 2019-09-19 RX ORDER — HYDROCHLOROTHIAZIDE 25 MG
25 TABLET ORAL DAILY
Refills: 0 | Status: DISCONTINUED | OUTPATIENT
Start: 2019-09-19 | End: 2019-09-19

## 2019-09-19 RX ORDER — ACETAMINOPHEN 500 MG
650 TABLET ORAL EVERY 6 HOURS
Refills: 0 | Status: DISCONTINUED | OUTPATIENT
Start: 2019-09-19 | End: 2019-09-20

## 2019-09-19 RX ORDER — PIPERACILLIN AND TAZOBACTAM 4; .5 G/20ML; G/20ML
3.38 INJECTION, POWDER, LYOPHILIZED, FOR SOLUTION INTRAVENOUS EVERY 8 HOURS
Refills: 0 | Status: DISCONTINUED | OUTPATIENT
Start: 2019-09-19 | End: 2019-09-19

## 2019-09-19 RX ORDER — DEXAMETHASONE 0.5 MG/5ML
8 ELIXIR ORAL ONCE
Refills: 0 | Status: DISCONTINUED | OUTPATIENT
Start: 2019-09-19 | End: 2019-09-19

## 2019-09-19 RX ORDER — METOPROLOL TARTRATE 50 MG
50 TABLET ORAL DAILY
Refills: 0 | Status: DISCONTINUED | OUTPATIENT
Start: 2019-09-19 | End: 2019-09-19

## 2019-09-19 RX ORDER — ENOXAPARIN SODIUM 100 MG/ML
40 INJECTION SUBCUTANEOUS ONCE
Refills: 0 | Status: COMPLETED | OUTPATIENT
Start: 2019-09-19 | End: 2019-09-19

## 2019-09-19 RX ORDER — SODIUM CHLORIDE 9 MG/ML
1000 INJECTION, SOLUTION INTRAVENOUS
Refills: 0 | Status: DISCONTINUED | OUTPATIENT
Start: 2019-09-19 | End: 2019-09-19

## 2019-09-19 RX ORDER — METOPROLOL TARTRATE 50 MG
50 TABLET ORAL DAILY
Refills: 0 | Status: DISCONTINUED | OUTPATIENT
Start: 2019-09-19 | End: 2019-09-20

## 2019-09-19 RX ORDER — HYDROCHLOROTHIAZIDE 25 MG
25 TABLET ORAL DAILY
Refills: 0 | Status: DISCONTINUED | OUTPATIENT
Start: 2019-09-19 | End: 2019-09-20

## 2019-09-19 RX ORDER — LACTOBACILLUS ACIDOPHILUS 100MM CELL
1 CAPSULE ORAL DAILY
Refills: 0 | Status: DISCONTINUED | OUTPATIENT
Start: 2019-09-19 | End: 2019-09-20

## 2019-09-19 RX ORDER — FENTANYL CITRATE 50 UG/ML
25 INJECTION INTRAVENOUS
Refills: 0 | Status: DISCONTINUED | OUTPATIENT
Start: 2019-09-19 | End: 2019-09-19

## 2019-09-19 RX ORDER — PIPERACILLIN AND TAZOBACTAM 4; .5 G/20ML; G/20ML
3.38 INJECTION, POWDER, LYOPHILIZED, FOR SOLUTION INTRAVENOUS EVERY 8 HOURS
Refills: 0 | Status: DISCONTINUED | OUTPATIENT
Start: 2019-09-19 | End: 2019-09-20

## 2019-09-19 RX ORDER — PANTOPRAZOLE SODIUM 20 MG/1
40 TABLET, DELAYED RELEASE ORAL
Refills: 0 | Status: DISCONTINUED | OUTPATIENT
Start: 2019-09-19 | End: 2019-09-20

## 2019-09-19 RX ORDER — ATORVASTATIN CALCIUM 80 MG/1
10 TABLET, FILM COATED ORAL AT BEDTIME
Refills: 0 | Status: DISCONTINUED | OUTPATIENT
Start: 2019-09-19 | End: 2019-09-19

## 2019-09-19 RX ORDER — ALLOPURINOL 300 MG
100 TABLET ORAL DAILY
Refills: 0 | Status: DISCONTINUED | OUTPATIENT
Start: 2019-09-19 | End: 2019-09-20

## 2019-09-19 RX ORDER — LACTOBACILLUS ACIDOPHILUS 100MM CELL
1 CAPSULE ORAL DAILY
Refills: 0 | Status: DISCONTINUED | OUTPATIENT
Start: 2019-09-19 | End: 2019-09-19

## 2019-09-19 RX ORDER — SODIUM,POTASSIUM PHOSPHATES 278-250MG
1 POWDER IN PACKET (EA) ORAL THREE TIMES A DAY
Refills: 0 | Status: DISCONTINUED | OUTPATIENT
Start: 2019-09-19 | End: 2019-09-19

## 2019-09-19 RX ORDER — ATORVASTATIN CALCIUM 80 MG/1
10 TABLET, FILM COATED ORAL AT BEDTIME
Refills: 0 | Status: DISCONTINUED | OUTPATIENT
Start: 2019-09-19 | End: 2019-09-20

## 2019-09-19 RX ORDER — IBUPROFEN 200 MG
400 TABLET ORAL EVERY 6 HOURS
Refills: 0 | Status: DISCONTINUED | OUTPATIENT
Start: 2019-09-19 | End: 2019-09-20

## 2019-09-19 RX ADMIN — Medication 1 PACKET(S): at 06:48

## 2019-09-19 RX ADMIN — FENTANYL CITRATE 25 MICROGRAM(S): 50 INJECTION INTRAVENOUS at 15:25

## 2019-09-19 RX ADMIN — Medication 50 MILLIGRAM(S): at 06:40

## 2019-09-19 RX ADMIN — ATORVASTATIN CALCIUM 10 MILLIGRAM(S): 80 TABLET, FILM COATED ORAL at 22:21

## 2019-09-19 RX ADMIN — ENOXAPARIN SODIUM 40 MILLIGRAM(S): 100 INJECTION SUBCUTANEOUS at 22:22

## 2019-09-19 RX ADMIN — PIPERACILLIN AND TAZOBACTAM 25 GRAM(S): 4; .5 INJECTION, POWDER, LYOPHILIZED, FOR SOLUTION INTRAVENOUS at 02:48

## 2019-09-19 RX ADMIN — SODIUM CHLORIDE 125 MILLILITER(S): 9 INJECTION, SOLUTION INTRAVENOUS at 08:45

## 2019-09-19 RX ADMIN — PIPERACILLIN AND TAZOBACTAM 25 GRAM(S): 4; .5 INJECTION, POWDER, LYOPHILIZED, FOR SOLUTION INTRAVENOUS at 14:00

## 2019-09-19 RX ADMIN — SODIUM CHLORIDE 125 MILLILITER(S): 9 INJECTION, SOLUTION INTRAVENOUS at 17:33

## 2019-09-19 RX ADMIN — Medication 250 MILLIGRAM(S): at 14:00

## 2019-09-19 RX ADMIN — PANTOPRAZOLE SODIUM 40 MILLIGRAM(S): 20 TABLET, DELAYED RELEASE ORAL at 06:40

## 2019-09-19 RX ADMIN — Medication 250 MILLIGRAM(S): at 06:41

## 2019-09-19 RX ADMIN — Medication 1 PACKET(S): at 23:15

## 2019-09-19 RX ADMIN — Medication 650 MILLIGRAM(S): at 20:22

## 2019-09-19 RX ADMIN — Medication 25 MILLIGRAM(S): at 06:40

## 2019-09-19 RX ADMIN — Medication 650 MILLIGRAM(S): at 21:30

## 2019-09-19 RX ADMIN — PIPERACILLIN AND TAZOBACTAM 25 GRAM(S): 4; .5 INJECTION, POWDER, LYOPHILIZED, FOR SOLUTION INTRAVENOUS at 22:21

## 2019-09-19 NOTE — PROGRESS NOTE ADULT - ASSESSMENT
69 yo male obese with CHEL on nocturnal CIPAP transfered from [facility] for cholelithiasis and  m CBD dilation r/o CBD stone for ERCP. He was seen by GI , spiked fever  on the night of admission , started on empirical iv zosyn  . day 2 had MRCP positive for CBD dilation , possible cholecytstitis , he was evaluated by surgery team ,.s/p ERCP 9/17 stone extracted during procedure recovery he did not wake up per anestesiologist had to be intubated sedated , eventually became awake and succesfully extubated , seen by cardiology and pulmonary preop optimized   he was monitored in SDU/ICU setting overnight , respiratory status remains  stable after ERCP  , for cholecystectomy     1- Choledocholithiasis with acute cholecystitis  s/p ERCP CBD stone extracted   surgery follow up for cholecytectomy today   now in PACU   continue iv zosyn , pain meds as needed   diet per surgery team     2- CHEL on CIPAP at night     3- Hypophosphatemia   replaced , resolved   repeat lytes reviewed     4- HTN - controlled cont current medication     cont iv fluid   trend wbc   home in 1-2 days if stable

## 2019-09-19 NOTE — PROGRESS NOTE ADULT - SUBJECTIVE AND OBJECTIVE BOX
HPI/OVERNIGHT EVENTS:No acute events overnight. Denies fever, chills, nausea, vomiting, chest pain, SOB, dizziness, abd pain or any other concerning symptoms. Patient pre-opped for OR today.    Vital Signs Last 24 Hrs  T(C): 36.4 (19 Sep 2019 00:20), Max: 39.1 (18 Sep 2019 19:43)  T(F): 97.5 (19 Sep 2019 00:20), Max: 102.3 (18 Sep 2019 19:43)  HR: 73 (19 Sep 2019 00:20) (56 - 93)  BP: 111/64 (19 Sep 2019 00:20) (103/67 - 200/96)  BP(mean): 130 (18 Sep 2019 19:43) (81 - 130)  RR: 18 (19 Sep 2019 00:20) (14 - 28)  SpO2: 98% (19 Sep 2019 00:20) (91% - 98%)    I&O's Detail    17 Sep 2019 07:01  -  18 Sep 2019 07:00  --------------------------------------------------------  IN:    lactated ringers.: 1950 mL    Oral Fluid: 600 mL    Solution: 200 mL  Total IN: 2750 mL    OUT:    Voided: 1990 mL  Total OUT: 1990 mL    Total NET: 760 mL      18 Sep 2019 07:01  -  19 Sep 2019 01:58  --------------------------------------------------------  IN:    lactated ringers.: 1200 mL    Oral Fluid: 120 mL    Solution: 125 mL  Total IN: 1445 mL    OUT:    Voided: 1225 mL  Total OUT: 1225 mL    Total NET: 220 mL    Gen: patient resting comfortably in bed. no acute distress  Pulm: on bipap machine. no increased wob or use of accessory muscles. patient on bipap at baseline while sleeping  Abd: obese, soft, non-tender, no palpable masses. no discolorations    LABS:                        13.7   5.13  )-----------( 211      ( 18 Sep 2019 20:28 )             40.7     09-18    140  |  100  |  22.0<H>  ----------------------------<  98  3.6   |  24.0  |  1.10    Ca    9.2      18 Sep 2019 20:28    TPro  7.0  /  Alb  3.4  /  TBili  3.5<H>  /  DBili  x   /  AST  90<H>  /  ALT  98<H>  /  AlkPhos  152<H>  09-18    PT/INR - ( 18 Sep 2019 20:28 )   PT: 14.5 sec;   INR: 1.25 ratio               MEDICATIONS  (STANDING):  allopurinol 100 milliGRAM(s) Oral daily  atorvastatin 10 milliGRAM(s) Oral at bedtime  enoxaparin Injectable 40 milliGRAM(s) SubCutaneous once  hydrochlorothiazide 25 milliGRAM(s) Oral daily  indomethacin Suppository 100 milliGRAM(s) Rectal once  lactobacillus acidophilus 1 Tablet(s) Oral daily  metoprolol succinate ER 50 milliGRAM(s) Oral daily  pantoprazole    Tablet 40 milliGRAM(s) Oral before breakfast  piperacillin/tazobactam IVPB. 3.375 Gram(s) IV Intermittent once  piperacillin/tazobactam IVPB.. 3.375 Gram(s) IV Intermittent every 6 hours  piperacillin/tazobactam IVPB.. 3.375 Gram(s) IV Intermittent every 8 hours  potassium phosphate / sodium phosphate powder 1 Packet(s) Oral three times a day  vancomycin  IVPB      vancomycin  IVPB 1000 milliGRAM(s) IV Intermittent every 8 hours    MEDICATIONS  (PRN):  acetaminophen   Tablet .. 650 milliGRAM(s) Oral every 6 hours PRN Temp greater or equal to 38C (100.4F)  ibuprofen  Tablet. 400 milliGRAM(s) Oral every 6 hours PRN Temp greater or equal to 38.5C (101.3F)  morphine  - Injectable 2 milliGRAM(s) IV Push every 4 hours PRN Moderate Pain (4 - 6)  ondansetron Injectable 4 milliGRAM(s) IV Push every 6 hours PRN Nausea and/or Vomiting

## 2019-09-19 NOTE — BRIEF OPERATIVE NOTE - NSICDXBRIEFPOSTOP_GEN_ALL_CORE_FT
POST-OP DIAGNOSIS:  Choledocholithiasis with obstruction 17-Sep-2019 13:25:33  Aryan Piña
POST-OP DIAGNOSIS:  Choledocholithiasis with obstruction 17-Sep-2019 13:25:33  Aryan Piña

## 2019-09-19 NOTE — BRIEF OPERATIVE NOTE - NSICDXBRIEFPREOP_GEN_ALL_CORE_FT
PRE-OP DIAGNOSIS:  Calculus bile duct w/obstruction and w/o cholangitis or cholecystitis 17-Sep-2019 13:25:01  Ayana, Aryan  Abnormal LFTs 17-Sep-2019 13:24:23  Aryan Piña  Choledocholithiasis with obstruction 17-Sep-2019 13:24:08  Aryan Piña
PRE-OP DIAGNOSIS:  Choledocholithiasis with obstruction 17-Sep-2019 13:24:08  Aryan Piña

## 2019-09-19 NOTE — PROGRESS NOTE ADULT - ASSESSMENT
69yo male transferred from OSH for evaluation of possible choledocholithiasis. Surgery consulted for possible lap cholecystectomy this admission following biliary clearance.     - plan for lap jose this admission, likely 9/19  - recommend c/w IV ABx

## 2019-09-19 NOTE — BRIEF OPERATIVE NOTE - NSICDXBRIEFPROCEDURE_GEN_ALL_CORE_FT
PROCEDURES:  ERCP, with calculus removal 17-Sep-2019 13:23:44  Aryan Piña  ERCP, with sphincterotomy 17-Sep-2019 13:23:14  Aryan Piña
PROCEDURES:  Laparoscopic cholecystectomy 19-Sep-2019 14:59:34  Barbi King

## 2019-09-19 NOTE — BRIEF OPERATIVE NOTE - OPERATION/FINDINGS
Normal papilla.  CBD canulated.  Multiple small and medium sized CBD stones.  ES 12 mm performed.  All CBD stones removed.  CBD cleared.
Pus filled chronically inflamed gallbladder. Critical view of safety achieved prior to clipping cystic duct and cystic artery. Cystic duct reinforced with Endoloop. 3L irrigation given stone spillage requiring patient retrieval and irrigation. Hemostasis achieved prior to closure.

## 2019-09-19 NOTE — PROGRESS NOTE ADULT - SUBJECTIVE AND OBJECTIVE BOX
Internal Medicine Hospitalist Progress Note    follow up for gallstone , cholecystitis  seen in am before surgery , pt was anxious, abd pain improved , no shortness of breath   he had fever last night          Vital Signs Last 24 Hrs  T(C): 37.5 (19 Sep 2019 11:37), Max: 39.1 (18 Sep 2019 19:40)  T(F): 99.5 (19 Sep 2019 11:37), Max: 102.3 (18 Sep 2019 19:40)  HR: 71 (19 Sep 2019 11:37) (64 - 93)  BP: 142/72 (19 Sep 2019 11:37) (103/67 - 200/96)  BP(mean): 130 (18 Sep 2019 19:43) (81 - 130)  RR: 16 (19 Sep 2019 11:37) (16 - 28)  SpO2: 95% (19 Sep 2019 11:37) (92% - 98%)  PHYSICAL EXAM:      Constitutional: awake alert no distress     Respiratory: CTA bilateral , decreased BS on the right base no wheezing , no rales     Cardiovascular: regular s1 /s2     Gastrointestinal: soft , no  tenderness in RUQ, obese , BS positive     Extremities: no pretibial edema   Skin : normal color no rash                             12.3   11.58 )-----------( 198      ( 19 Sep 2019 08:23 )             36.5   09-19    143  |  103  |  21.0<H>  ----------------------------<  112  3.9   |  26.0  |  1.05    Ca    8.7      19 Sep 2019 08:23  Phos  3.5     09-19  Mg     1.9     09-19    TPro  6.5<L>  /  Alb  3.1<L>  /  TBili  2.6<H>  /  DBili  0.7<H>  /  AST  54<H>  /  ALT  76<H>  /  AlkPhos  134<H>  09-19                                       12.7   11.18 )-----------( 153      ( 18 Sep 2019 04:01 )             37.7

## 2019-09-19 NOTE — CHART NOTE - NSCHARTNOTEFT_GEN_A_CORE
Post Operative Check    Patient is post op from a Laparoscopic cholecystectomy and is stable. He reports abdominal pain which is appropriate and controlled with medication. Had some nausea though resolved.  Tolerated some light snacks and juice. Denies nausea, vomiting, fever, chills, chest pain, or shortness of breath. IS pulling to 1000.    Vitals    T(C): 36.8 (09-19-19 @ 23:42), Max: 37.5 (09-19-19 @ 11:37)  HR: 64 (09-19-19 @ 23:42) (61 - 78)  BP: 122/75 (09-19-19 @ 23:42) (119/69 - 142/72)  RR: 20 (09-19-19 @ 23:42) (10 - 20)  SpO2: 93% (09-19-19 @ 23:42) (91% - 98%)  Wt(kg): --      09-18 @ 07:01  -  09-19 @ 07:00  --------------------------------------------------------  IN:    lactated ringers.: 1200 mL    Oral Fluid: 120 mL    Solution: 125 mL  Total IN: 1445 mL    OUT:    Voided: 1225 mL  Total OUT: 1225 mL    Total NET: 220 mL      09-19 @ 07:01  -  09-20 @ 02:36  --------------------------------------------------------  IN:  Total IN: 0 mL    OUT:    Voided: 500 mL  Total OUT: 500 mL    Total NET: -500 mL          Labs                        12.3   11.58 )-----------( 198      ( 19 Sep 2019 08:23 )             36.5       CBC Full  -  ( 19 Sep 2019 08:23 )  WBC Count : 11.58 K/uL  Hemoglobin : 12.3 g/dL  Hematocrit : 36.5 %  Platelet Count - Automated : 198 K/uL  Mean Cell Volume : 90.6 fl  Mean Cell Hemoglobin : 30.5 pg  Mean Cell Hemoglobin Concentration : 33.7 gm/dL  Auto Neutrophil # : 9.20 K/uL  Auto Lymphocyte # : 1.36 K/uL  Auto Monocyte # : 0.84 K/uL  Auto Eosinophil # : 0.06 K/uL  Auto Basophil # : 0.02 K/uL  Auto Neutrophil % : 79.4 %  Auto Lymphocyte % : 11.7 %  Auto Monocyte % : 7.3 %  Auto Eosinophil % : 0.5 %  Auto Basophil % : 0.2 %      Physical Exam  General: Laying in bed in NAD  Cards: RRR S1S2  Resp: CTAB, unlabored breathing  Abdomen: Soft, slightly distended, non-tender to light palpation. Incision dressings clean dry and intact.       Patient is a 68y old Male s/p lap cholecystectomy. He is in some pain though it is fairly controlled with pain medication. Otherwise stable.    - Regular diet  - F/u BCx from code sepsis on 9/18  - Pain control  - Encourage OOB and IS

## 2019-09-19 NOTE — BRIEF OPERATIVE NOTE - COMMENTS
1. Wound Class IV   2. Continue IV ABx for at least 24hrs while awaiting blood cultures collected during code sepsis pending to determine duration of IV ABx   3. Extubated with no complications 1. Wound Class IV .  2. Continue IV ABx for at least 24hrs while awaiting blood cultures collected during code sepsis pending to determine duration of IV ABx   3. Extubated with no complications

## 2019-09-20 ENCOUNTER — TRANSCRIPTION ENCOUNTER (OUTPATIENT)
Age: 69
End: 2019-09-20

## 2019-09-20 VITALS
SYSTOLIC BLOOD PRESSURE: 124 MMHG | DIASTOLIC BLOOD PRESSURE: 72 MMHG | RESPIRATION RATE: 18 BRPM | HEART RATE: 73 BPM | TEMPERATURE: 99 F | OXYGEN SATURATION: 92 %

## 2019-09-20 LAB
CULTURE RESULTS: NO GROWTH — SIGNIFICANT CHANGE UP
CULTURE RESULTS: SIGNIFICANT CHANGE UP
CULTURE RESULTS: SIGNIFICANT CHANGE UP
SPECIMEN SOURCE: SIGNIFICANT CHANGE UP
VANCOMYCIN TROUGH SERPL-MCNC: <4 UG/ML — LOW (ref 10–20)

## 2019-09-20 PROCEDURE — 83735 ASSAY OF MAGNESIUM: CPT

## 2019-09-20 PROCEDURE — 86901 BLOOD TYPING SEROLOGIC RH(D): CPT

## 2019-09-20 PROCEDURE — 80048 BASIC METABOLIC PNL TOTAL CA: CPT

## 2019-09-20 PROCEDURE — 85014 HEMATOCRIT: CPT

## 2019-09-20 PROCEDURE — 99285 EMERGENCY DEPT VISIT HI MDM: CPT | Mod: 25

## 2019-09-20 PROCEDURE — 93005 ELECTROCARDIOGRAM TRACING: CPT

## 2019-09-20 PROCEDURE — 83690 ASSAY OF LIPASE: CPT

## 2019-09-20 PROCEDURE — 88304 TISSUE EXAM BY PATHOLOGIST: CPT

## 2019-09-20 PROCEDURE — C1769: CPT

## 2019-09-20 PROCEDURE — 99024 POSTOP FOLLOW-UP VISIT: CPT

## 2019-09-20 PROCEDURE — 80076 HEPATIC FUNCTION PANEL: CPT

## 2019-09-20 PROCEDURE — 87086 URINE CULTURE/COLONY COUNT: CPT

## 2019-09-20 PROCEDURE — 36415 COLL VENOUS BLD VENIPUNCTURE: CPT

## 2019-09-20 PROCEDURE — 82248 BILIRUBIN DIRECT: CPT

## 2019-09-20 PROCEDURE — 82330 ASSAY OF CALCIUM: CPT

## 2019-09-20 PROCEDURE — 85730 THROMBOPLASTIN TIME PARTIAL: CPT

## 2019-09-20 PROCEDURE — 84295 ASSAY OF SERUM SODIUM: CPT

## 2019-09-20 PROCEDURE — 80202 ASSAY OF VANCOMYCIN: CPT

## 2019-09-20 PROCEDURE — 86803 HEPATITIS C AB TEST: CPT

## 2019-09-20 PROCEDURE — 84132 ASSAY OF SERUM POTASSIUM: CPT

## 2019-09-20 PROCEDURE — 80074 ACUTE HEPATITIS PANEL: CPT

## 2019-09-20 PROCEDURE — 96375 TX/PRO/DX INJ NEW DRUG ADDON: CPT

## 2019-09-20 PROCEDURE — 85027 COMPLETE CBC AUTOMATED: CPT

## 2019-09-20 PROCEDURE — 82947 ASSAY GLUCOSE BLOOD QUANT: CPT

## 2019-09-20 PROCEDURE — 82803 BLOOD GASES ANY COMBINATION: CPT

## 2019-09-20 PROCEDURE — 86850 RBC ANTIBODY SCREEN: CPT

## 2019-09-20 PROCEDURE — 81001 URINALYSIS AUTO W/SCOPE: CPT

## 2019-09-20 PROCEDURE — 99232 SBSQ HOSP IP/OBS MODERATE 35: CPT

## 2019-09-20 PROCEDURE — 74330 X-RAY BILE/PANC ENDOSCOPY: CPT

## 2019-09-20 PROCEDURE — 82435 ASSAY OF BLOOD CHLORIDE: CPT

## 2019-09-20 PROCEDURE — C1773: CPT

## 2019-09-20 PROCEDURE — 87040 BLOOD CULTURE FOR BACTERIA: CPT

## 2019-09-20 PROCEDURE — 84100 ASSAY OF PHOSPHORUS: CPT

## 2019-09-20 PROCEDURE — 80053 COMPREHEN METABOLIC PANEL: CPT

## 2019-09-20 PROCEDURE — 83605 ASSAY OF LACTIC ACID: CPT

## 2019-09-20 PROCEDURE — 85610 PROTHROMBIN TIME: CPT

## 2019-09-20 PROCEDURE — 71045 X-RAY EXAM CHEST 1 VIEW: CPT

## 2019-09-20 PROCEDURE — 82962 GLUCOSE BLOOD TEST: CPT

## 2019-09-20 PROCEDURE — 96374 THER/PROPH/DIAG INJ IV PUSH: CPT

## 2019-09-20 PROCEDURE — 86900 BLOOD TYPING SEROLOGIC ABO: CPT

## 2019-09-20 PROCEDURE — 74181 MRI ABDOMEN W/O CONTRAST: CPT

## 2019-09-20 RX ORDER — TRAMADOL HYDROCHLORIDE 50 MG/1
1 TABLET ORAL
Qty: 3 | Refills: 0
Start: 2019-09-20 | End: 2019-09-20

## 2019-09-20 RX ORDER — ENOXAPARIN SODIUM 100 MG/ML
40 INJECTION SUBCUTANEOUS DAILY
Refills: 0 | Status: DISCONTINUED | OUTPATIENT
Start: 2019-09-20 | End: 2019-09-20

## 2019-09-20 RX ADMIN — Medication 400 MILLIGRAM(S): at 00:02

## 2019-09-20 RX ADMIN — Medication 400 MILLIGRAM(S): at 09:05

## 2019-09-20 RX ADMIN — Medication 25 MILLIGRAM(S): at 05:50

## 2019-09-20 RX ADMIN — PANTOPRAZOLE SODIUM 40 MILLIGRAM(S): 20 TABLET, DELAYED RELEASE ORAL at 05:50

## 2019-09-20 RX ADMIN — PIPERACILLIN AND TAZOBACTAM 25 GRAM(S): 4; .5 INJECTION, POWDER, LYOPHILIZED, FOR SOLUTION INTRAVENOUS at 14:27

## 2019-09-20 RX ADMIN — Medication 1 PACKET(S): at 05:51

## 2019-09-20 RX ADMIN — Medication 250 MILLIGRAM(S): at 11:10

## 2019-09-20 RX ADMIN — Medication 100 MILLIGRAM(S): at 14:27

## 2019-09-20 RX ADMIN — Medication 1 PACKET(S): at 14:28

## 2019-09-20 RX ADMIN — Medication 1 TABLET(S): at 14:27

## 2019-09-20 RX ADMIN — Medication 400 MILLIGRAM(S): at 08:24

## 2019-09-20 RX ADMIN — PIPERACILLIN AND TAZOBACTAM 25 GRAM(S): 4; .5 INJECTION, POWDER, LYOPHILIZED, FOR SOLUTION INTRAVENOUS at 05:51

## 2019-09-20 RX ADMIN — Medication 50 MILLIGRAM(S): at 05:50

## 2019-09-20 NOTE — PROGRESS NOTE ADULT - REASON FOR ADMISSION
tx from [facility] for gallstones / CBD stone
tx from [facility] for gallstones / CBD stone
tx from [facility] for gallstones / CBD stone
transfer  from [facility] for gallstones / CBD stone
tx from  for gallstones / CBD stone
tx from [facility] for gallstones / CBD stone
tx from [facility] for gallstones / CBD stone
tx from [facility] for gallstones / CBD stone
tx from [facility] for gallstones / CBD stone
tx from [facility] for gallstones / CBD stone
tx from [facility] for gallstones / CBD stone
tx from [facility] for gallstones / CBD stone

## 2019-09-20 NOTE — DISCHARGE NOTE PROVIDER - NSDCACTIVITY_GEN_ALL_CORE
No heavy lifting/straining/Showering allowed/Walking - Indoors allowed/No restrictions/Stairs allowed/Walking - Outdoors allowed

## 2019-09-20 NOTE — PROGRESS NOTE ADULT - ASSESSMENT
Patient is a 68y old Male s/p lap cholecystectomy.Pain fairly controlled with pain medication. Stable    - Regular diet  - F/u BCx from code sepsis on 9/18  - Pain control  - Encourage OOB and IS

## 2019-09-20 NOTE — DIETITIAN INITIAL EVALUATION ADULT. - OTHER INFO
Pt admitted with calculus of bile duct now s/p laparoscopic cholecystectomy. Pt reports trying to follow a healthful diet at home, confirmed with diet recall, however reports binging in between lunch and dinner. Discussed adding protein and fiber to breakfast and lunch to feel aguilar longer. Diet advanced to DASH/TLC this AM, so far tolerated well. Pt was provided verbal and written nutrition education for dietary recommendations s/p cholecystomy. Pt was receptive to the education and understanding of the materials provided.

## 2019-09-20 NOTE — PROGRESS NOTE ADULT - PROVIDER SPECIALTY LIST ADULT
Hospitalist
Surgery
Gastroenterology
Gastroenterology

## 2019-09-20 NOTE — PROGRESS NOTE ADULT - ATTENDING COMMENTS
I have seen and examined the patient.   charly icteric, complaining of epigastric pain.  on exam obese, dressing c/d/i, no peritoneal signs.  tolerating diet.  continue abx until bacteremia has been ruled out.  send surveillance blood cultures today.  OOB ambulate

## 2019-09-20 NOTE — PROGRESS NOTE ADULT - SUBJECTIVE AND OBJECTIVE BOX
Internal Medicine Hospitalist Progress Note    follow up for gallstone , cholecystitis s/p cholecystectomy post op day #1     seen this morning , lying in the bed ,he is feeling better , abd pain improving   tolerating diet , denies SOB , no chest pain   encourage to use incentive spirometry and ambulation          Vital Signs Last 24 Hrs  T(C): 37 (20 Sep 2019 15:50), Max: 37.3 (20 Sep 2019 08:44)  T(F): 98.6 (20 Sep 2019 15:50), Max: 99.2 (20 Sep 2019 08:44)  HR: 73 (20 Sep 2019 15:50) (63 - 73)  BP: 124/72 (20 Sep 2019 15:50) (120/69 - 131/82)  BP(mean): --  RR: 18 (20 Sep 2019 15:50) (18 - 20)  SpO2: 92% (20 Sep 2019 15:50) (92% - 95%)  PHYSICAL EXAM:      Constitutional: awake alert , lying in the bed     Respiratory: CTA bilateral  anteriorly , no wheezing     Cardiovascular: regular s1 /s2     Gastrointestinal: soft , +   tenderness in RUQ epigastric area , BS hyperactive , mild distention      Extremities: no pretibial edema     Skin : normal color no rash                           12.3   11.58 )-----------( 198      ( 19 Sep 2019 08:23 )             36.5   09-19    143  |  103  |  21.0<H>  ----------------------------<  112  3.9   |  26.0  |  1.05    Ca    8.7      19 Sep 2019 08:23  Phos  3.5     09-19  Mg     1.9     09-19    TPro  6.5<L>  /  Alb  3.1<L>  /  TBili  2.6<H>  /  DBili  0.7<H>  /  AST  54<H>  /  ALT  76<H>  /  AlkPhos  134<H>  09-19

## 2019-09-20 NOTE — DISCHARGE NOTE PROVIDER - CARE PROVIDER_API CALL
Acute care surgery clinic,   [address] [place] [address], [address] [place] [address]  Phone: [phone]  Fax: (   )    -  Follow Up Time:

## 2019-09-20 NOTE — DISCHARGE NOTE PROVIDER - NSDCFUADDAPPT_GEN_ALL_CORE_FT
Please call in 1-2 weeks to make follow-up appointment with surgery clinic    Please follow-up with your primary care doctor for all other inquiries

## 2019-09-20 NOTE — DISCHARGE NOTE PROVIDER - PROVIDER TOKENS
FREE:[LAST:[Acute care surgery clinic],PHONE:[phone]],FAX:[phone]   [phone]    -],ADDRESS:[[address] [place] [address], [address] [place] [address]]]

## 2019-09-20 NOTE — PROGRESS NOTE ADULT - ASSESSMENT
67 yo male obese with CHEL on nocturnal CIPAP transfered from [facility] for cholelithiasis and  m CBD dilation r/o CBD stone for ERCP. He was seen by GI , spiked fever  on the night of admission , started on empirical iv zosyn  . day 2 had MRCP positive for CBD dilation , possible cholecytstitis , he was evaluated by surgery team ,.s/p ERCP 9/17 stone extracted during procedure recovery he did not wake up per anestesiologist had to be intubated sedated , eventually became awake and succesfully extubated , seen by cardiology and pulmonary preop optimized   he was monitored in SDU/ICU setting overnight , respiratory status remains  stable after ERCP  , for cholecystectomy     1- Choledocholithiasis with acute cholecystitis  sepsis resolved , ERCP prior surgery for CBD stone   s/p cholecytectomy  pus filled inflamed gallbladder per report   cont iv abx    blood cx from 9/16 no growth   follow up blood cx from 9/18 code sepsis   discharge home once juliette by surgery ,  discuss with surgery team re abx therapy  need after discharge   icterus is improving   trend LFTs and wbc     2- CHEL on CIPAP at night     3- Hypophosphatemia   replaced , resolved     4- HTN - controlled cont current medication       DVT prophylaxis , discharge once cleared by surgery

## 2019-09-20 NOTE — DISCHARGE NOTE PROVIDER - HOSPITAL COURSE
HPI: 68yMale w/ PMH obesity, CHEL, and HTN transferred from [facility] on 09-15-19 for evaluation of choledocholithiasis. Pt states that last night he began having severe epigastric pain radiating to his back associated w/ NBNB emesis. Pt sx did not improve w/ OTC meds so he sought care at , where RUQ u/s showed CBD 6mm and leukocytosis to 15. Due to concern for choledocho, pt was transferred to [facility]. Pt bilirubin noted to increase to 2.3 and he continued to have leukocytosis. GI consulted who recommended surgical consult. Pt has not had intraabdominal surgery before. He is not on any blood thinners. He has never had abdominal pain like this in the past. He has had recent colonoscopy, states there were no concerning findings.         Hospital course: Patient was taken by GI for ERCP w/sphincterotomy on 9/17, post-procedurally patient had difficulty waking up, however was successfully extubated. Patient was then taken to the OR on 9/19 for lap jose, which was uncomplicated. Patient did well post-op. Pain controlled, tolerating diet.         Please call to make follow-up appointment with ACS/Trauma clinic.         Can take over the counter tylenol and ibuprofen for mild to moderate pain, tramadol for severe pain.

## 2019-09-20 NOTE — DISCHARGE NOTE NURSING/CASE MANAGEMENT/SOCIAL WORK - PATIENT PORTAL LINK FT
You can access the FollowMyHealth Patient Portal offered by [facility] by registering at the following website: [email or web address]. By joining [facility]’s FollowMyHealth portal, you will also be able to view your health information using other applications (apps) compatible with our system.

## 2019-09-20 NOTE — PROGRESS NOTE ADULT - SUBJECTIVE AND OBJECTIVE BOX
HPI/OVERNIGHT EVENTS:  No acute overnight events. S/p lap cholecystectomy yesterday, which tolerated well. Some appropriate abdominal pain, controlled on medication. Denies fever, chills, chest pain, shortness of breath.    MEDICATIONS  (STANDING):  allopurinol 100 milliGRAM(s) Oral daily  atorvastatin 10 milliGRAM(s) Oral at bedtime  hydrochlorothiazide 25 milliGRAM(s) Oral daily  lactobacillus acidophilus 1 Tablet(s) Oral daily  metoprolol succinate ER 50 milliGRAM(s) Oral daily  pantoprazole    Tablet 40 milliGRAM(s) Oral before breakfast  piperacillin/tazobactam IVPB.. 3.375 Gram(s) IV Intermittent every 8 hours  potassium phosphate / sodium phosphate powder 1 Packet(s) Oral three times a day  vancomycin  IVPB 1000 milliGRAM(s) IV Intermittent every 12 hours    MEDICATIONS  (PRN):  acetaminophen   Tablet .. 650 milliGRAM(s) Oral every 6 hours PRN Temp greater or equal to 38C (100.4F), Moderate Pain (4 - 6)  ibuprofen  Tablet. 400 milliGRAM(s) Oral every 6 hours PRN Moderate Pain (4 - 6)      Vital Signs Last 24 Hrs  T(C): 36.8 (19 Sep 2019 23:42), Max: 37.5 (19 Sep 2019 11:37)  T(F): 98.3 (19 Sep 2019 23:42), Max: 99.5 (19 Sep 2019 11:37)  HR: 64 (19 Sep 2019 23:42) (61 - 78)  BP: 122/75 (19 Sep 2019 23:42) (119/69 - 142/72)  BP(mean): --  RR: 20 (19 Sep 2019 23:42) (10 - 20)  SpO2: 93% (19 Sep 2019 23:42) (91% - 98%)    Constitutional: patient laying in bed, in no acute distress  HEENT: EOMI, no active drainage or redness  Neck: Full ROM without pain  Respiratory: respirations are unlabored, no accessory muscle use, no conversational dyspnea  Cardiovascular: regular rate & rhythm  Gastrointestinal: Abdomen soft, appropriately tender, slightly distended. Incision dressings clean dry and intact.  Neurological: no gross sensory / motor / coordination deficits  Musculoskeletal: no limitation of movement      I&O's Detail    18 Sep 2019 07:01  -  19 Sep 2019 07:00  --------------------------------------------------------  IN:    lactated ringers.: 1200 mL    Oral Fluid: 120 mL    Solution: 125 mL  Total IN: 1445 mL    OUT:    Voided: 1225 mL  Total OUT: 1225 mL    Total NET: 220 mL      19 Sep 2019 07:01  -  20 Sep 2019 02:57  --------------------------------------------------------  IN:  Total IN: 0 mL    OUT:    Voided: 500 mL  Total OUT: 500 mL    Total NET: -500 mL          LABS:                        12.3   11.58 )-----------( 198      ( 19 Sep 2019 08:23 )             36.5         143  |  103  |  21.0<H>  ----------------------------<  112  3.9   |  26.0  |  1.05    Ca    8.7      19 Sep 2019 08:23  Phos  3.5       Mg     1.9         TPro  6.5<L>  /  Alb  3.1<L>  /  TBili  2.6<H>  /  DBili  0.7<H>  /  AST  54<H>  /  ALT  76<H>  /  AlkPhos  134<H>      PT/INR - ( 19 Sep 2019 08:23 )   PT: 14.4 sec;   INR: 1.24 ratio           Urinalysis Basic - ( 19 Sep 2019 03:21 )    Color: Yellow / Appearance: Clear / S.010 / pH: x  Gluc: x / Ketone: Negative  / Bili: Negative / Urobili: 8 mg/dL   Blood: x / Protein: 30 mg/dL / Nitrite: Negative   Leuk Esterase: Negative / RBC: 0-2 /HPF / WBC Negative   Sq Epi: x / Non Sq Epi: Occasional / Bacteria: x

## 2019-09-21 LAB
CULTURE RESULTS: SIGNIFICANT CHANGE UP
CULTURE RESULTS: SIGNIFICANT CHANGE UP
SPECIMEN SOURCE: SIGNIFICANT CHANGE UP
SPECIMEN SOURCE: SIGNIFICANT CHANGE UP

## 2019-09-25 LAB — SURGICAL PATHOLOGY STUDY: SIGNIFICANT CHANGE UP

## 2019-10-01 ENCOUNTER — APPOINTMENT (OUTPATIENT)
Dept: TRAUMA SURGERY | Facility: CLINIC | Age: 69
End: 2019-10-01
Payer: COMMERCIAL

## 2019-10-01 VITALS
HEIGHT: 69 IN | WEIGHT: 239 LBS | BODY MASS INDEX: 35.4 KG/M2 | TEMPERATURE: 98.5 F | DIASTOLIC BLOOD PRESSURE: 78 MMHG | HEART RATE: 78 BPM | OXYGEN SATURATION: 93 % | SYSTOLIC BLOOD PRESSURE: 124 MMHG

## 2019-10-01 DIAGNOSIS — K80.40 CALCULUS OF BILE DUCT WITH CHOLECYSTITIS, UNSPECIFIED, W/OUT OBSTRUCTION: ICD-10-CM

## 2019-10-01 PROCEDURE — 99024 POSTOP FOLLOW-UP VISIT: CPT

## 2019-10-01 NOTE — PLAN
[FreeTextEntry1] : Adequate evolution after cholecystectomy.\par tolerating diet, \par discussed adjustment of intestine to  cholecystectomy\par path discussed , all questions answered to his satisfaction\par RTO PRN

## 2019-10-01 NOTE — HISTORY OF PRESENT ILLNESS
[de-identified] : Lap cholecystectomy for acute cholecystis and choledocholithiasis the need ERCP pre op.\par complaining of bloating with cereal and fatty foods\par tolerating diet, having BM, \par

## 2019-12-19 ENCOUNTER — APPOINTMENT (OUTPATIENT)
Dept: UROLOGY | Facility: CLINIC | Age: 69
End: 2019-12-19
Payer: COMMERCIAL

## 2019-12-19 VITALS
HEART RATE: 68 BPM | WEIGHT: 220 LBS | BODY MASS INDEX: 32.58 KG/M2 | DIASTOLIC BLOOD PRESSURE: 77 MMHG | SYSTOLIC BLOOD PRESSURE: 114 MMHG | HEIGHT: 69 IN | OXYGEN SATURATION: 95 %

## 2019-12-19 DIAGNOSIS — N28.1 CYST OF KIDNEY, ACQUIRED: ICD-10-CM

## 2019-12-19 DIAGNOSIS — Z87.442 PERSONAL HISTORY OF URINARY CALCULI: ICD-10-CM

## 2019-12-19 PROCEDURE — 99213 OFFICE O/P EST LOW 20 MIN: CPT

## 2019-12-19 NOTE — PHYSICAL EXAM
[General Appearance - Well Developed] : well developed [Normal Appearance] : normal appearance [General Appearance - Well Nourished] : well nourished [Well Groomed] : well groomed [General Appearance - In No Acute Distress] : no acute distress

## 2019-12-19 NOTE — HISTORY OF PRESENT ILLNESS
[FreeTextEntry1] : This patient is here for a checkup. He had labs done and all his fine. Sonogram was done but the results are not yet available per

## 2020-01-21 ENCOUNTER — EMERGENCY (EMERGENCY)
Facility: HOSPITAL | Age: 70
LOS: 0 days | Discharge: HOME | End: 2020-01-21
Attending: EMERGENCY MEDICINE
Payer: COMMERCIAL

## 2020-01-21 VITALS
HEART RATE: 82 BPM | HEIGHT: 69 IN | WEIGHT: 220.02 LBS | DIASTOLIC BLOOD PRESSURE: 84 MMHG | SYSTOLIC BLOOD PRESSURE: 167 MMHG | OXYGEN SATURATION: 95 % | RESPIRATION RATE: 18 BRPM | TEMPERATURE: 97 F

## 2020-01-21 VITALS
HEART RATE: 72 BPM | DIASTOLIC BLOOD PRESSURE: 72 MMHG | RESPIRATION RATE: 17 BRPM | TEMPERATURE: 98 F | SYSTOLIC BLOOD PRESSURE: 127 MMHG | OXYGEN SATURATION: 95 %

## 2020-01-21 DIAGNOSIS — Z99.89 DEPENDENCE ON OTHER ENABLING MACHINES AND DEVICES: ICD-10-CM

## 2020-01-21 DIAGNOSIS — E78.5 HYPERLIPIDEMIA, UNSPECIFIED: ICD-10-CM

## 2020-01-21 DIAGNOSIS — Z96.659 PRESENCE OF UNSPECIFIED ARTIFICIAL KNEE JOINT: Chronic | ICD-10-CM

## 2020-01-21 DIAGNOSIS — G47.33 OBSTRUCTIVE SLEEP APNEA (ADULT) (PEDIATRIC): ICD-10-CM

## 2020-01-21 DIAGNOSIS — Z98.89 OTHER SPECIFIED POSTPROCEDURAL STATES: Chronic | ICD-10-CM

## 2020-01-21 DIAGNOSIS — Z87.442 PERSONAL HISTORY OF URINARY CALCULI: ICD-10-CM

## 2020-01-21 DIAGNOSIS — M19.90 UNSPECIFIED OSTEOARTHRITIS, UNSPECIFIED SITE: ICD-10-CM

## 2020-01-21 DIAGNOSIS — G25.3 MYOCLONUS: ICD-10-CM

## 2020-01-21 DIAGNOSIS — I10 ESSENTIAL (PRIMARY) HYPERTENSION: ICD-10-CM

## 2020-01-21 DIAGNOSIS — E66.01 MORBID (SEVERE) OBESITY DUE TO EXCESS CALORIES: ICD-10-CM

## 2020-01-21 LAB
ANION GAP SERPL CALC-SCNC: 6 MMOL/L — SIGNIFICANT CHANGE UP (ref 5–17)
BASOPHILS # BLD AUTO: 0.02 K/UL — SIGNIFICANT CHANGE UP (ref 0–0.2)
BASOPHILS NFR BLD AUTO: 0.2 % — SIGNIFICANT CHANGE UP (ref 0–2)
BUN SERPL-MCNC: 20 MG/DL — SIGNIFICANT CHANGE UP (ref 7–23)
CALCIUM SERPL-MCNC: 9.4 MG/DL — SIGNIFICANT CHANGE UP (ref 8.5–10.1)
CHLORIDE SERPL-SCNC: 103 MMOL/L — SIGNIFICANT CHANGE UP (ref 96–108)
CO2 SERPL-SCNC: 30 MMOL/L — SIGNIFICANT CHANGE UP (ref 22–31)
CREAT SERPL-MCNC: 0.94 MG/DL — SIGNIFICANT CHANGE UP (ref 0.5–1.3)
EOSINOPHIL # BLD AUTO: 0.43 K/UL — SIGNIFICANT CHANGE UP (ref 0–0.5)
EOSINOPHIL NFR BLD AUTO: 5.1 % — SIGNIFICANT CHANGE UP (ref 0–6)
GLUCOSE SERPL-MCNC: 107 MG/DL — HIGH (ref 70–99)
HCT VFR BLD CALC: 44.6 % — SIGNIFICANT CHANGE UP (ref 39–50)
HGB BLD-MCNC: 14.7 G/DL — SIGNIFICANT CHANGE UP (ref 13–17)
IMM GRANULOCYTES NFR BLD AUTO: 0.5 % — SIGNIFICANT CHANGE UP (ref 0–1.5)
LYMPHOCYTES # BLD AUTO: 2.85 K/UL — SIGNIFICANT CHANGE UP (ref 1–3.3)
LYMPHOCYTES # BLD AUTO: 34.1 % — SIGNIFICANT CHANGE UP (ref 13–44)
MAGNESIUM SERPL-MCNC: 2.2 MG/DL — SIGNIFICANT CHANGE UP (ref 1.6–2.6)
MCHC RBC-ENTMCNC: 29.6 PG — SIGNIFICANT CHANGE UP (ref 27–34)
MCHC RBC-ENTMCNC: 33 GM/DL — SIGNIFICANT CHANGE UP (ref 32–36)
MCV RBC AUTO: 89.9 FL — SIGNIFICANT CHANGE UP (ref 80–100)
MONOCYTES # BLD AUTO: 0.89 K/UL — SIGNIFICANT CHANGE UP (ref 0–0.9)
MONOCYTES NFR BLD AUTO: 10.6 % — SIGNIFICANT CHANGE UP (ref 2–14)
NEUTROPHILS # BLD AUTO: 4.14 K/UL — SIGNIFICANT CHANGE UP (ref 1.8–7.4)
NEUTROPHILS NFR BLD AUTO: 49.5 % — SIGNIFICANT CHANGE UP (ref 43–77)
PLATELET # BLD AUTO: 231 K/UL — SIGNIFICANT CHANGE UP (ref 150–400)
POTASSIUM SERPL-MCNC: 4 MMOL/L — SIGNIFICANT CHANGE UP (ref 3.5–5.3)
POTASSIUM SERPL-SCNC: 4 MMOL/L — SIGNIFICANT CHANGE UP (ref 3.5–5.3)
RBC # BLD: 4.96 M/UL — SIGNIFICANT CHANGE UP (ref 4.2–5.8)
RBC # FLD: 14.5 % — SIGNIFICANT CHANGE UP (ref 10.3–14.5)
SODIUM SERPL-SCNC: 139 MMOL/L — SIGNIFICANT CHANGE UP (ref 135–145)
WBC # BLD: 8.37 K/UL — SIGNIFICANT CHANGE UP (ref 3.8–10.5)
WBC # FLD AUTO: 8.37 K/UL — SIGNIFICANT CHANGE UP (ref 3.8–10.5)

## 2020-01-21 PROCEDURE — 70450 CT HEAD/BRAIN W/O DYE: CPT | Mod: 26

## 2020-01-21 PROCEDURE — 85025 COMPLETE CBC W/AUTO DIFF WBC: CPT

## 2020-01-21 PROCEDURE — 36415 COLL VENOUS BLD VENIPUNCTURE: CPT

## 2020-01-21 PROCEDURE — 70450 CT HEAD/BRAIN W/O DYE: CPT

## 2020-01-21 PROCEDURE — 83735 ASSAY OF MAGNESIUM: CPT

## 2020-01-21 PROCEDURE — 99284 EMERGENCY DEPT VISIT MOD MDM: CPT

## 2020-01-21 PROCEDURE — 80048 BASIC METABOLIC PNL TOTAL CA: CPT

## 2020-01-21 PROCEDURE — 99284 EMERGENCY DEPT VISIT MOD MDM: CPT | Mod: 25

## 2020-01-21 NOTE — ED PROVIDER NOTE - CARE PROVIDER_API CALL
Faheem Richard)  Internal Medicine; Neurology  [address], [address]  [place] [address]  Phone: [phone]  Fax: [phone]  Follow Up Time: 1-3 Days

## 2020-01-21 NOTE — ED PROVIDER NOTE - NSFOLLOWUPINSTRUCTIONS_ED_ALL_ED_FT
Myoclonus  Myoclonus is the sudden and quick movement of the muscles. It may include muscle jerks, twitches, or spasms that happen without a person's control (involuntary). There are different types of myoclonus. One type, called physiologic myoclonus, occurs normally in most people and rarely needs treatment. This type includes:  Hiccups.Sleep starts, or twitching during sleep.Other types are caused by an underlying condition and may require treatment.  What are the causes?  The cause of this condition varies depending on the type of myoclonus. Physiologic myoclonus, such as hiccups, results from normal actions of the body. Other causes include:  Genetic condition or disease. This causes a type of myoclonus called essential myoclonus.Epilepsy with seizures. This causes epileptic myoclonus.Other underlying conditions cause secondary or symptomatic myoclonus. These include:  Side effects of certain medicines.Metabolic conditions.Head, brain, or spinal injuries.Stroke.Nervous system conditions such as dementia, Parkinson disease, and Alzheimer disease.Infections.Poisoning.Brain tumors.Not having enough oxygen.What are the signs or symptoms?  The main symptom of this condition is sudden spasms, jerking, or uncontrollable movements. Symptoms:  May occur in only one area of the body or over the entire body.May come and go and may vary in intensity.May make it hard for you to eat, speak, or walk.How is this diagnosed?     This condition is diagnosed based on your medical history, a review of your symptoms, and a physical exam.  You may also have tests, including:  Electroencephalogram (EEG). This test checks the electrical activity of the brain.Electromyogram (EMG). This test measures electrical activity in the muscles.Imaging tests such as an MRI or CT scan.Lumbar puncture (spinal tap) to check spinal fluid for infection or inflammation.Blood tests.Urine tests.How is this treated?  Treatment for this condition depends on the underlying condition and the severity of your symptoms. Treatment may include:  Medicines, such as tranquilizer and anti-seizure medicines.Injections of botulinum toxins.Treating the underlying cause, such as having a tumor removed or taking antibiotic medicine for an infection.Physiologic myoclonus does not require treatment.  Follow these instructions at home:  Take over-the-counter and prescription medicines only as told by your health care provider.If you are taking tranquilizer or anti-seizure medicines, do not drive or use heavy machinery until your body adjusts to the medicine. These medicines may cause drowsiness.Keep a journal of your symptoms and the things that seem to trigger them or make them worse. Also, try to identity the things that make them better. Share this information with your health care provider.Keep all follow-up visits as told by your health care provider. This is important.Contact a health care provider if:  You have severe pain and medicines do not help.You have problems taking your medicines.Your twitches, jerks, or spasms get worse.Get help right away if:  You have a seizure.You have a reaction to your medicines, such as a rash, trouble breathing, or swelling.You have trouble staying alert.Summary  Myoclonus is the sudden and quick movement of the muscles. It may include muscle jerks, twitches, or spasms that happen without a person's control (involuntary).There are different types of myoclonus. Physiologic myoclonus occurs normally in most people and rarely needs treatment. Other types of myoclonus are caused by an underlying condition.Treatment for this condition depends on the underlying condition and the severity of your symptoms.If you are taking tranquilizer or anti-seizure medicines, do not drive or use heavy machinery until your body adjusts to the medicine. These medicines may cause drowsiness.This information is not intended to replace advice given to you by your health care provider. Make sure you discuss any questions you have with your health care provider.
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
## 2020-01-21 NOTE — ED PROVIDER NOTE - PATIENT PORTAL LINK FT
You can access the FollowMyHealth Patient Portal offered by [facility] by registering at the following website: [email or web address]. By joining [facility]’s FollowMyHealth portal, you will also be able to view your health information using other applications (apps) compatible with our system.

## 2020-01-21 NOTE — ED ADULT NURSE NOTE - CHPI ED NUR SYMPTOMS NEG
no decreased eating/drinking/no dizziness/no fever/no tingling/no pain/no chills/no nausea/no weakness/no vomiting

## 2020-01-21 NOTE — ED ADULT TRIAGE NOTE - CHIEF COMPLAINT QUOTE
pt presents to ED with complaints of right arm shaking. pt is s/p left rotator cuff sx on 1/14. pt also has hx of sleep apnea and was not wearing is cpap.
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
## 2020-01-21 NOTE — ED PROVIDER NOTE - OBJECTIVE STATEMENT
70 y/o M with h/o HTN, morbid obesity, CHEL on CPAP and L rotator cuff surgery on 1/14/2020 c/o right arm shaking for about 10 minutes after he woke up in his recliner about 20-30 minutes PTA which resolved enroute to the ED.  Pt denies any slurred speech, weakness or numbness/tingling.  He was alert and oriented during the entire episode per his wife.  Pt denies any fever.  He has been "dealing with a cold" the past few days.

## 2020-04-21 ENCOUNTER — TRANSCRIPTION ENCOUNTER (OUTPATIENT)
Age: 70
End: 2020-04-21

## 2020-09-22 NOTE — PHYSICAL EXAM
[id number] Chest Pain  NR  PCP appt  10:00  Med rec/1111F order completed. Patient contacted regarding Judy Gannon. Discussed COVID-19 related testing which was not done at this time. Test results were not done. Patient informed of results, if available? [facility] Transition Nurse/ Ambulatory Care Manager contacted the patient by telephone to perform post discharge assessment. Call within 2 business days of discharge: Yes. Verified name and  with patient as identifiers. Provided introduction to self, and explanation of the CTN/ACM role, and reason for call due to risk factors for infection and/or exposure to COVID-19. Symptoms reviewed with patient who verbalized the following symptoms: Denies chest pain/pressure, palpitation, or dizzy events. Denies fever, chills, or flu-like symptoms. Denies any home, DME, or med needs. Due to no new or worsening symptoms encounter was not routed to provider for escalation. Discussed follow-up appointments. If no appointment was previously scheduled, appointment scheduling offered: [facility]   Future Appointments   Date Time Provider Valerie Cosby   2020 10:00 AM Thor Thompson, [facility] [address]   10/1/2020  9:30 AM Thor Thompson [address] [place] [address]   2020 10:00 AM Barbara Dalton MD [facility] follow up appointment(s):     Non-face-to-face services provided:  Obtained and reviewed discharge summary and/or continuity of care documents  Education of patient/family/caregiver/guardian to support self-management-Reviewed symptoms of acute MI to call 911, Reviewed symptoms of CV-19 to report. Advance Care Planning:   Does patient have an Advance Directive:  not on file.      Patient has following risk factors of: NA. CTN/ACM reviewed discharge instructions, medical action plan and red flags such as increased shortness of breath, increasing fever and [JVD] : no jugular venous distention  signs of decompensation with patient who verbalized understanding. Discussed exposure protocols and quarantine with CDC Guidelines What to do if you are sick with coronavirus disease 2019.  Patient was given an opportunity for questions and concerns. The patient agrees to contact the [facility] exposure line [phone], local health department [place] Department of Health: ([phone]) and PCP office for questions related to their healthcare. CTN/ACM provided contact information for future needs. Reviewed and educated patient on any new and changed medications related to discharge diagnosis     Patient/family/caregiver given information for GetWell Loop and agrees to enroll no  Patient's preferred e-mail:    Patient's preferred phone number:   Based on Loop alert triggers, patient will be contacted by nurse care manager for worsening symptoms. Plan for follow-up call in 5-7 days based on severity of symptoms and risk factors. Advance Care Planning  People with COVID-19 may have no symptoms, mild symptoms, such as fever, cough, and shortness of breath or they may have more severe illness, developing severe and fatal pneumonia. As a result, Advance Care Planning with attention to naming a health care decision maker (someone you trust to make healthcare decisions for you if you could not speak for yourself) and sharing other health care preferences is important BEFORE a possible health crisis. Please contact your Primary Care Provider to discuss Advance Care Planning.     Preventing the Spread of Coronavirus Disease 2019 in Homes and Residential Communities  For the most recent information go to [employer]    Prevention steps for People with confirmed or suspected COVID-19 (including persons under investigation) who do not need to be hospitalized  and   People with confirmed COVID-19 who were hospitalized and determined to be medically stable to [Normal Breath Sounds] : Normal breath sounds go home    Your healthcare provider and public health staff will evaluate whether you can be cared for at home. If it is determined that you do not need to be hospitalized and can be isolated at home, you will be monitored by staff from your local or state health department. You should follow the prevention steps below until a healthcare provider or local or state health department says you can return to your normal activities. Stay home except to get medical care  People who are mildly ill with COVID-19 are able to isolate at home during their illness. You should restrict activities outside your home, except for getting medical care. Do not go to work, school, or public areas. Avoid using public transportation, ride-sharing, or taxis. Separate yourself from other people and animals in your home  People: As much as possible, you should stay in a specific room and away from other people in your home. Also, you should use a separate bathroom, if available. Animals: You should restrict contact with pets and other animals while you are sick with COVID-19, just like you would around other people. Although there have not been reports of pets or other animals becoming sick with COVID-19, it is still recommended that people sick with COVID-19 limit contact with animals until more information is known about the virus. When possible, have another member of your household care for your animals while you are sick. If you are sick with COVID-19, avoid contact with your pet, including petting, snuggling, being kissed or licked, and sharing food. If you must care for your pet or be around animals while you are sick, wash your hands before and after you interact with pets and wear a facemask. Call ahead before visiting your doctor  If you have a medical appointment, call the healthcare provider and tell them that you have or may have COVID-19.  This will help the healthcare providers office take steps to keep other people from [de-identified] : NAD getting infected or exposed. Wear a facemask  You should wear a facemask when you are around other people (e.g., sharing a room or vehicle) or pets and before you enter a healthcare providers office. If you are not able to wear a facemask (for example, because it causes trouble breathing), then people who live with you should not stay in the same room with you, or they should wear a facemask if they enter your room. Cover your coughs and sneezes  Cover your mouth and nose with a tissue when you cough or sneeze. Throw used tissues in a lined trash can. Immediately wash your hands with soap and water for at least 20 seconds or, if soap and water are not available, clean your hands with an alcohol-based hand  that contains at least 60% alcohol. Clean your hands often  Wash your hands often with soap and water for at least 20 seconds, especially after blowing your nose, coughing, or sneezing; going to the bathroom; and before eating or preparing food. If soap and water are not readily available, use an alcohol-based hand  with at least 60% alcohol, covering all surfaces of your hands and rubbing them together until they feel dry. Soap and water are the best option if hands are visibly dirty. Avoid touching your eyes, nose, and mouth with unwashed hands. Avoid sharing personal household items  You should not share dishes, drinking glasses, cups, eating utensils, towels, or bedding with other people or pets in your home. After using these items, they should be washed thoroughly with soap and water. Clean all high-touch surfaces everyday  High touch surfaces include counters, tabletops, doorknobs, bathroom fixtures, toilets, phones, keyboards, tablets, and bedside tables. Also, clean any surfaces that may have blood, stool, or body fluids on them. Use a household cleaning spray or wipe, according to the label instructions.  Labels contain instructions for safe and effective use of the cleaning [de-identified] : anicteric scleare product including precautions you should take when applying the product, such as wearing gloves and making sure you have good ventilation during use of the product. Monitor your symptoms  Seek prompt medical attention if your illness is worsening (e.g., difficulty breathing). Before seeking care, call your healthcare provider and tell them that you have, or are being evaluated for, COVID-19. Put on a facemask before you enter the facility. These steps will help the healthcare providers office to keep other people in the office or waiting room from getting infected or exposed. Ask your healthcare provider to call the local or state health department. Persons who are placed under active monitoring or facilitated self-monitoring should follow instructions provided by their local health department or occupational health professionals, as appropriate. When working with your local health department check their available hours. If you have a medical emergency and need to call 911, notify the dispatch personnel that you have, or are being evaluated for COVID-19. If possible, put on a facemask before emergency medical services arrive. Discontinuing home isolation  Patients with confirmed COVID-19 should remain under home isolation precautions until the risk of secondary transmission to others is thought to be low. The decision to discontinue home isolation precautions should be made on a case-by-case basis, in consultation with healthcare providers and state and local health departments. [de-identified] : obese, soft, well healed incisions, no hernias felt.

## 2020-10-09 ENCOUNTER — INPATIENT (INPATIENT)
Facility: HOSPITAL | Age: 70
LOS: 0 days | Discharge: ANOTHER HOSPITAL | DRG: 444 | End: 2020-10-10
Attending: FAMILY MEDICINE | Admitting: FAMILY MEDICINE
Payer: COMMERCIAL

## 2020-10-09 VITALS — HEIGHT: 69 IN | WEIGHT: 238.1 LBS

## 2020-10-09 DIAGNOSIS — Z86.19 PERSONAL HISTORY OF OTHER INFECTIOUS AND PARASITIC DISEASES: ICD-10-CM

## 2020-10-09 DIAGNOSIS — E66.01 MORBID (SEVERE) OBESITY DUE TO EXCESS CALORIES: ICD-10-CM

## 2020-10-09 DIAGNOSIS — K76.9 LIVER DISEASE, UNSPECIFIED: ICD-10-CM

## 2020-10-09 DIAGNOSIS — K83.8 OTHER SPECIFIED DISEASES OF BILIARY TRACT: ICD-10-CM

## 2020-10-09 DIAGNOSIS — Z96.659 PRESENCE OF UNSPECIFIED ARTIFICIAL KNEE JOINT: Chronic | ICD-10-CM

## 2020-10-09 DIAGNOSIS — Z98.89 OTHER SPECIFIED POSTPROCEDURAL STATES: Chronic | ICD-10-CM

## 2020-10-09 DIAGNOSIS — Z90.49 ACQUIRED ABSENCE OF OTHER SPECIFIED PARTS OF DIGESTIVE TRACT: Chronic | ICD-10-CM

## 2020-10-09 DIAGNOSIS — G47.33 OBSTRUCTIVE SLEEP APNEA (ADULT) (PEDIATRIC): ICD-10-CM

## 2020-10-09 DIAGNOSIS — M19.91 PRIMARY OSTEOARTHRITIS, UNSPECIFIED SITE: ICD-10-CM

## 2020-10-09 DIAGNOSIS — E78.5 HYPERLIPIDEMIA, UNSPECIFIED: ICD-10-CM

## 2020-10-09 DIAGNOSIS — Z98.890 OTHER SPECIFIED POSTPROCEDURAL STATES: Chronic | ICD-10-CM

## 2020-10-09 DIAGNOSIS — K75.0 ABSCESS OF LIVER: ICD-10-CM

## 2020-10-09 DIAGNOSIS — I10 ESSENTIAL (PRIMARY) HYPERTENSION: ICD-10-CM

## 2020-10-09 DIAGNOSIS — K65.1 PERITONEAL ABSCESS: ICD-10-CM

## 2020-10-09 DIAGNOSIS — K80.50 CALCULUS OF BILE DUCT WITHOUT CHOLANGITIS OR CHOLECYSTITIS WITHOUT OBSTRUCTION: ICD-10-CM

## 2020-10-09 LAB
ALBUMIN SERPL ELPH-MCNC: 3.4 G/DL — SIGNIFICANT CHANGE UP (ref 3.3–5)
ALP SERPL-CCNC: 97 U/L — SIGNIFICANT CHANGE UP (ref 40–120)
ALT FLD-CCNC: 33 U/L — SIGNIFICANT CHANGE UP (ref 12–78)
ANION GAP SERPL CALC-SCNC: 9 MMOL/L — SIGNIFICANT CHANGE UP (ref 5–17)
APPEARANCE UR: CLEAR — SIGNIFICANT CHANGE UP
APTT BLD: 25.1 SEC — LOW (ref 27.5–35.5)
AST SERPL-CCNC: 37 U/L — SIGNIFICANT CHANGE UP (ref 15–37)
BASOPHILS # BLD AUTO: 0.04 K/UL — SIGNIFICANT CHANGE UP (ref 0–0.2)
BASOPHILS NFR BLD AUTO: 0.3 % — SIGNIFICANT CHANGE UP (ref 0–2)
BILIRUB SERPL-MCNC: 1.4 MG/DL — HIGH (ref 0.2–1.2)
BILIRUB UR-MCNC: NEGATIVE — SIGNIFICANT CHANGE UP
BUN SERPL-MCNC: 17 MG/DL — SIGNIFICANT CHANGE UP (ref 7–23)
CALCIUM SERPL-MCNC: 9.3 MG/DL — SIGNIFICANT CHANGE UP (ref 8.5–10.1)
CHLORIDE SERPL-SCNC: 102 MMOL/L — SIGNIFICANT CHANGE UP (ref 96–108)
CO2 SERPL-SCNC: 24 MMOL/L — SIGNIFICANT CHANGE UP (ref 22–31)
COLOR SPEC: YELLOW — SIGNIFICANT CHANGE UP
CREAT SERPL-MCNC: 0.92 MG/DL — SIGNIFICANT CHANGE UP (ref 0.5–1.3)
DIFF PNL FLD: ABNORMAL
EOSINOPHIL # BLD AUTO: 0.15 K/UL — SIGNIFICANT CHANGE UP (ref 0–0.5)
EOSINOPHIL NFR BLD AUTO: 1.3 % — SIGNIFICANT CHANGE UP (ref 0–6)
GLUCOSE SERPL-MCNC: 107 MG/DL — HIGH (ref 70–99)
GLUCOSE UR QL: NEGATIVE MG/DL — SIGNIFICANT CHANGE UP
HCT VFR BLD CALC: 41.6 % — SIGNIFICANT CHANGE UP (ref 39–50)
HGB BLD-MCNC: 13.9 G/DL — SIGNIFICANT CHANGE UP (ref 13–17)
IMM GRANULOCYTES NFR BLD AUTO: 0.7 % — SIGNIFICANT CHANGE UP (ref 0–1.5)
INR BLD: 1.18 RATIO — HIGH (ref 0.88–1.16)
KETONES UR-MCNC: NEGATIVE — SIGNIFICANT CHANGE UP
LEUKOCYTE ESTERASE UR-ACNC: NEGATIVE — SIGNIFICANT CHANGE UP
LYMPHOCYTES # BLD AUTO: 2.41 K/UL — SIGNIFICANT CHANGE UP (ref 1–3.3)
LYMPHOCYTES # BLD AUTO: 20.3 % — SIGNIFICANT CHANGE UP (ref 13–44)
MCHC RBC-ENTMCNC: 30.6 PG — SIGNIFICANT CHANGE UP (ref 27–34)
MCHC RBC-ENTMCNC: 33.4 GM/DL — SIGNIFICANT CHANGE UP (ref 32–36)
MCV RBC AUTO: 91.6 FL — SIGNIFICANT CHANGE UP (ref 80–100)
MONOCYTES # BLD AUTO: 1.18 K/UL — HIGH (ref 0–0.9)
MONOCYTES NFR BLD AUTO: 9.9 % — SIGNIFICANT CHANGE UP (ref 2–14)
NEUTROPHILS # BLD AUTO: 8.04 K/UL — HIGH (ref 1.8–7.4)
NEUTROPHILS NFR BLD AUTO: 67.5 % — SIGNIFICANT CHANGE UP (ref 43–77)
NITRITE UR-MCNC: NEGATIVE — SIGNIFICANT CHANGE UP
PH UR: 6 — SIGNIFICANT CHANGE UP (ref 5–8)
PLATELET # BLD AUTO: 276 K/UL — SIGNIFICANT CHANGE UP (ref 150–400)
POTASSIUM SERPL-MCNC: 4.3 MMOL/L — SIGNIFICANT CHANGE UP (ref 3.5–5.3)
POTASSIUM SERPL-SCNC: 4.3 MMOL/L — SIGNIFICANT CHANGE UP (ref 3.5–5.3)
PROT SERPL-MCNC: 8.2 GM/DL — SIGNIFICANT CHANGE UP (ref 6–8.3)
PROT UR-MCNC: NEGATIVE MG/DL — SIGNIFICANT CHANGE UP
PROTHROM AB SERPL-ACNC: 13.6 SEC — SIGNIFICANT CHANGE UP (ref 10.6–13.6)
RBC # BLD: 4.54 M/UL — SIGNIFICANT CHANGE UP (ref 4.2–5.8)
RBC # FLD: 13.7 % — SIGNIFICANT CHANGE UP (ref 10.3–14.5)
SODIUM SERPL-SCNC: 135 MMOL/L — SIGNIFICANT CHANGE UP (ref 135–145)
SP GR SPEC: 1.01 — SIGNIFICANT CHANGE UP (ref 1.01–1.02)
UROBILINOGEN FLD QL: NEGATIVE MG/DL — SIGNIFICANT CHANGE UP
WBC # BLD: 11.9 K/UL — HIGH (ref 3.8–10.5)
WBC # FLD AUTO: 11.9 K/UL — HIGH (ref 3.8–10.5)

## 2020-10-09 PROCEDURE — 93005 ELECTROCARDIOGRAM TRACING: CPT

## 2020-10-09 PROCEDURE — A9579: CPT

## 2020-10-09 PROCEDURE — 93010 ELECTROCARDIOGRAM REPORT: CPT

## 2020-10-09 PROCEDURE — 84100 ASSAY OF PHOSPHORUS: CPT

## 2020-10-09 PROCEDURE — 80074 ACUTE HEPATITIS PANEL: CPT

## 2020-10-09 PROCEDURE — 74183 MRI ABD W/O CNTR FLWD CNTR: CPT

## 2020-10-09 PROCEDURE — 36415 COLL VENOUS BLD VENIPUNCTURE: CPT

## 2020-10-09 PROCEDURE — 86901 BLOOD TYPING SEROLOGIC RH(D): CPT

## 2020-10-09 PROCEDURE — 80053 COMPREHEN METABOLIC PANEL: CPT

## 2020-10-09 PROCEDURE — 83735 ASSAY OF MAGNESIUM: CPT

## 2020-10-09 PROCEDURE — 86900 BLOOD TYPING SEROLOGIC ABO: CPT

## 2020-10-09 PROCEDURE — 99223 1ST HOSP IP/OBS HIGH 75: CPT | Mod: AI

## 2020-10-09 PROCEDURE — 85025 COMPLETE CBC W/AUTO DIFF WBC: CPT

## 2020-10-09 PROCEDURE — 86850 RBC ANTIBODY SCREEN: CPT

## 2020-10-09 RX ORDER — CHOLECALCIFEROL (VITAMIN D3) 125 MCG
1000 CAPSULE ORAL DAILY
Refills: 0 | Status: DISCONTINUED | OUTPATIENT
Start: 2020-10-09 | End: 2020-10-10

## 2020-10-09 RX ORDER — HYDROCHLOROTHIAZIDE 25 MG
25 TABLET ORAL DAILY
Refills: 0 | Status: DISCONTINUED | OUTPATIENT
Start: 2020-10-09 | End: 2020-10-10

## 2020-10-09 RX ORDER — ALLOPURINOL 300 MG
100 TABLET ORAL DAILY
Refills: 0 | Status: DISCONTINUED | OUTPATIENT
Start: 2020-10-09 | End: 2020-10-10

## 2020-10-09 RX ORDER — ATORVASTATIN CALCIUM 80 MG/1
10 TABLET, FILM COATED ORAL AT BEDTIME
Refills: 0 | Status: DISCONTINUED | OUTPATIENT
Start: 2020-10-09 | End: 2020-10-10

## 2020-10-09 RX ORDER — METOPROLOL TARTRATE 50 MG
50 TABLET ORAL DAILY
Refills: 0 | Status: DISCONTINUED | OUTPATIENT
Start: 2020-10-09 | End: 2020-10-10

## 2020-10-09 RX ORDER — ONDANSETRON 8 MG/1
4 TABLET, FILM COATED ORAL EVERY 6 HOURS
Refills: 0 | Status: DISCONTINUED | OUTPATIENT
Start: 2020-10-09 | End: 2020-10-10

## 2020-10-09 RX ORDER — PIPERACILLIN AND TAZOBACTAM 4; .5 G/20ML; G/20ML
3.38 INJECTION, POWDER, LYOPHILIZED, FOR SOLUTION INTRAVENOUS ONCE
Refills: 0 | Status: COMPLETED | OUTPATIENT
Start: 2020-10-09 | End: 2020-10-09

## 2020-10-09 RX ORDER — ACETAMINOPHEN 500 MG
650 TABLET ORAL EVERY 6 HOURS
Refills: 0 | Status: DISCONTINUED | OUTPATIENT
Start: 2020-10-09 | End: 2020-10-10

## 2020-10-09 RX ORDER — GABAPENTIN 400 MG/1
300 CAPSULE ORAL DAILY
Refills: 0 | Status: DISCONTINUED | OUTPATIENT
Start: 2020-10-09 | End: 2020-10-10

## 2020-10-09 RX ORDER — PIPERACILLIN AND TAZOBACTAM 4; .5 G/20ML; G/20ML
3.38 INJECTION, POWDER, LYOPHILIZED, FOR SOLUTION INTRAVENOUS EVERY 8 HOURS
Refills: 0 | Status: DISCONTINUED | OUTPATIENT
Start: 2020-10-10 | End: 2020-10-10

## 2020-10-09 RX ADMIN — PIPERACILLIN AND TAZOBACTAM 3.38 GRAM(S): 4; .5 INJECTION, POWDER, LYOPHILIZED, FOR SOLUTION INTRAVENOUS at 17:41

## 2020-10-09 RX ADMIN — PIPERACILLIN AND TAZOBACTAM 200 GRAM(S): 4; .5 INJECTION, POWDER, LYOPHILIZED, FOR SOLUTION INTRAVENOUS at 17:11

## 2020-10-09 NOTE — ED STATDOCS - OBJECTIVE STATEMENT
70 y/o M with a PMHx of cholecystectomy, HTN, HLD, nephrolithiasis, OA, CHEL on CPAP presenting to the ED c/o R  R flank x4 weeks, worsening over past few days. Patient reports that he was seen by his PCD, Dr. Tirado, had CT done that showed gallstones with possible infection. Pain exacerbated with coughing. Told patient to come to the ED for further evaluation. Denies any N/V/D, fever or chills. 70 y/o M with a PMHx of cholecystectomy, HTN, HLD, nephrolithiasis, OA, CHEL on CPAP presenting to the ED c/o R  R flank x1 week, worsening over past few days. Pain exacerbated with coughing, non radiating. Patient reports that he was seen by his PCD, Dr. Tirado, had CT done that showed gallstones with possible infection. Told patient to come to the ED for further evaluation. Denies any N/V/D, fever or chills.

## 2020-10-09 NOTE — H&P ADULT - NSHPSOCIALHISTORY_GEN_ALL_CORE
No smoking, significant alcohol use, or drug use history.  Lives with his wife.  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

## 2020-10-09 NOTE — ED STATDOCS - CARE PLAN
Principal Discharge DX:	Liver abscess  Secondary Diagnosis:	Calculus of bile duct  Secondary Diagnosis:	Right upper quadrant abdominal pain

## 2020-10-09 NOTE — ED ADULT TRIAGE NOTE - CHIEF COMPLAINT QUOTE
Pt. to the ED BIB Wife C/O Right sided abdominal pain with radiation to the right side flank. Pt. states he was sent by Physician for CT evaluation of Lesion and possible infection of liver and a possible gallstone- Pt. denies CP, SOB and Rectal Bleeding- Hx. of Patient requesting Patient requesting COVID (April 2020)

## 2020-10-09 NOTE — H&P ADULT - NSHPLABSRESULTS_GEN_ALL_CORE
Labs personally reviewed and interpreted. Notable for leukocytosis (WBC 11.9) without clear left shift or lymphopenia. Hb 13.9, plt 276, INR 1.18, Na 135, K 4.3, Cl 102, HCO3 24, BUN/creatinine 17/0.92, , total bilirubin slightly elevated at 1.4, LFTs wnl.  UA shows trace blood, 0-2 WBCs/RBCs, occasional bacteria/epithelial cells, and SG 1.010.  COVID-19 PCR result pending.    No inpatient imaging performed.  Outpatient CT A/P with PO and IV contrast report reviewed. Notable for 3.2 x 4.4 x 3.3 cm necrotic subcapsular lesion in the right hepatic lobe, posterior segment 6 with irregular thickened wall and adjacent small amount of fluid and soft tissue infiltration. Heterogenous liver with possible hepatic steatosis, mild hepatomegaly (length 19.3 cm). Mild intrahepatic biliary dilatation. CBD 16 mm at the sanya hepatis. 9 mm density and distal CBD.  Atrophic pancreas.  2.3 cm left anterior interpolar renal cyst. 1-2 mm right interpolar, left lower pole calculi.  Mildly distended stomach. No SI dilatation or surrounding soft tissue infiltration.  Mild descending and sigmoid diverticulosis.  Possible diffuse urinary bladder wall thickening, although limited evaluation, mildly distended, no calculi. Prostate normal size with coarse central gland calcifications.  Very small fat-containing umbilical hernia.  Minimal RLL subsegmental atelectasis and/or scarring.    EKG personally reviewed. Normal sinus rhythm, normal axis. No pathological Q waves or ST changes. Rate ________________________

## 2020-10-09 NOTE — H&P ADULT - NSICDXPASTSURGICALHX_GEN_ALL_CORE_FT
PAST SURGICAL HISTORY:  History of ERCP     History of laparoscopic cholecystectomy     S/P arthroscopic knee surgery     S/P TKR (total knee replacement) bilateral

## 2020-10-09 NOTE — ED STATDOCS - CLINICAL SUMMARY MEDICAL DECISION MAKING FREE TEXT BOX
Patient with flank pain, CT with stone in distal CBD. Will consult GI, possible transfer for ERCP, will give one dose of abx for possible infection.

## 2020-10-09 NOTE — H&P ADULT - NSICDXFAMILYHX_GEN_ALL_CORE_FT
FAMILY HISTORY:  Family history of gallstones, mother   she  at age last 80's  FH: heart disease, dad  at age high 70's. First MI at age 47

## 2020-10-09 NOTE — H&P ADULT - NSHPREVIEWOFSYSTEMS_GEN_ALL_CORE
Gen: negative for fevers, chills, weight loss, weight gain, malaise, fatigue  Eyes: no blurred vision or lacrimation  ENT: no tinnitus, vertigo, or decreased hearing  Resp: no wheezing, dyspnea, pleuritic chest pain, hemoptysis, or orthopnea  CV: no chest pain, dyspnea on exertion, or palpitations  GI: + abdominal pain. No nausea, vomiting, diarrhea, constipation, melena, or hematochezia  : + right flank pain. No dysuria, hematuria, or incontinence  MSK: no arthralgias, joint swelling, or myalgias  Neuro: no focal deficits, confusion, weakness, dizziness, tremors, or seizures  Skin: no rash, lesions, or edema

## 2020-10-09 NOTE — ED STATDOCS - ATTENDING CONTRIBUTION TO CARE
I have evaluated the patient and agree with the documentation and assessment as made by the PA. We have discussed plan of care and work up for the patient.

## 2020-10-09 NOTE — H&P ADULT - HISTORY OF PRESENT ILLNESS
68 yo M with a PMH CHEL on CPAP, osteoarthritis, nephrolithiasis, Hepatitis B, HTN, HLD, and COVID who presents with right flank/abdominal pain. He has had the pain for about 2-3 weeks, achy, non-radiating, 2-3/10 in severity, but can increase as high as 5/10. He feels it is different from prior kidney stone pains. He denies dysuria, hesitancy, or discoloration of urine. He denies nausea, vomiting, blood in his stool, diarrhea, constipation, CP, SOB, fevers, chills, rash, or swelling anywhere. He also denies weight changes. The pain is worse slightly when he lies on the right side or when he coughs. It is not affected by eating or BMs.    Of note, he was admitted one year ago for choledocholithiasis and cholecystitis. He had an ERCP with sphincterotomy and laparoscopic cholecystectomy.  He states his hepatitis B is in remission.    In the ED, he was given Zosyn 3.375 g IV x1.

## 2020-10-09 NOTE — ED STATDOCS - NORMAL, MLM
PHYSICIAN NEXT STEPS:   Review Only      CHIEF COMPLAINT:   Chief Complaint/Protocol Used: Fever - 3 Months or Older   Onset: 12/30/17.         ASSESSMENT:   Â» Did not know what to do True   Â» Onset: 12/30/17.   Â» Fever Level: Today at 2:30pm= 102.3F   Â» Measurement: Digital, Forehead.   Â» Onset: 12/30/17.   Â» Child's Appearance: Awake, alert, eating and drinking.   Â» Pain: No.   Â» Symptoms: Cough, tired.   Â» Cause: \"Unsure- Bacterial\"   Â» Vaccine: No.   Â» Contacts: All 3 other siblings are sick.   Â» Fever Medicine: Tylenol Chewable 160mg- gave 1 tablet. (33 pounds).   -------------------------------------------------------      DISPOSITION:   Disposition Recommendation: See Physician within 24 Hours   Questions that led to disposition:   Â» Fever present > 3 days (72 hours)   Patient Directed To: Unspecified   Patient Intended Action: Seek care in the doctor's office          CALL NOTES:   01/02/2018 at 3:24 PM by Sachin WOOD» Patient's mother agreed with care advise and patient has 24 hour disposition but mother is requesting that patient be seen today since 2 of patient's siblings will be seen at [facility] today. Called [facility] through backline and spoke with Viviane. Patient's mother conferenced with Viviane. Mother advised to call back if patient's symptom worsen and agreed.       DISPOSITION OVERRIDE/PROVIDER CONSULT:   Disposition Override: See Physician within 4 Hours (or PCP triage)   Override Source: Patient stress level   Consulted with PCP: No   Consulted with On-Call Physician: No         CALLER CONTACT INFO:   Name: BRADLEY RAMOS (Mother)   Phone 1: [phone] (Home)   Phone 2: [phone] (Work)   Phone 3: [phone] - Preferred         ENCOUNTER STARTED:   01/02/18 03:03:54 PM   ENCOUNTER ASSIGNED TO/CLOSED BY:   Sachin Hale @ 01/02/18 03:24:47 PM         -------------------------------------------------------      CARE ADVICE given per Fever - 3  Months or Older guideline.   TREATMENT FOR ALL FEVERS - EXTRA FLUIDS AND LESS CLOTHING:    * Give cool fluids orally in unlimited amounts. (Exception: less than 6 months old.)   * Dress in 1 layer of lightweight clothing and sleep with 1 light blanket (avoid bundling). Caution: Overheated infants can't undress themselves.   * For fevers 100-102 F (37.8-39 C), fever medicine is rarely needed. Fevers of this level don't cause discomfort, but they do help the body fight the infection.; AVOID ALTERNATING ACETAMINOPHEN AND IBUPROFEN    * Do not recommend this practice (Reason: risk of overdosage)   * Instead, give reassurance about the benefits of fever (i.e., counteract fever phobia).   * EXCEPTION: If PCP has recommended alternating meds and fever above 104 F (40 C), help caller use safe dosage.   * Check the amount of each medication and have caller write it down.   * Suggest an every 4 hour dosage interval (every 8 hours for each medicine) for 24 hours.   * Have parents write down the dosing schedule and read it back to the RN.   * NURSE JUDGMENT: Can recommend for [1] Fever above 104 F (40 C) and [2] unresponsive to 1 medicine alone and [3] caller can't be reassured about fever (Reason: prevent ED visit); CARE ADVICE given per Fever - 3 Months or Older (Pediatric) guideline.; CALL BACK IF     * Your child becomes worse or fever goes above 105 F (40.6 C); SEE PHYSICIAN WITHIN 24 HOURS:   * IF OFFICE WILL BE OPEN: Your child needs to be examined within the next 24 hours. Call your child's doctor when the office opens, and make an appointment.   * IF OFFICE WILL BE CLOSED AND NO PCP TRIAGE: Your child needs to be examined within the next 24 hours. An Urgent Care Center is often a good source of care if your doctor's office closed. Go to _________ .   * IF OFFICE WILL BE CLOSED AND PCP TRIAGE REQUIRED: Your child may need to be seen within the next 24 hours. Your doctor will want to talk with you to decide what's best.  I'll page him now. (Exception: from 10 pm to 7 am. Since this isn't serious, we'll hold the page until morning.)   * IF PATIENT HAS NO PCP: Refer patient to an Urgent Care Center or Retail clinic.  Also try to help caller find a PCP (medical home) for their child.         UNDERSTANDS CARE ADVICE: Yes      AGREES WITH CARE ADVICE: No      WILL FOLLOW CARE ADVICE: Yes      -------------------------------------------------------   [place] all pertinent systems normal

## 2020-10-09 NOTE — H&P ADULT - NSHPPHYSICALEXAM_GEN_ALL_CORE
Vital Signs Last 24 Hrs  T(C): 37.1 (09 Oct 2020 17:42), Max: 37.1 (09 Oct 2020 17:42)  T(F): 98.7 (09 Oct 2020 17:42), Max: 98.7 (09 Oct 2020 17:42)  HR: 70 (09 Oct 2020 17:42) (70 - 75)  BP: 114/62 (09 Oct 2020 17:42) (114/62 - 119/77)  BP(mean): 74 (09 Oct 2020 17:42) (74 - 90)  RR: 16 (09 Oct 2020 17:42) (16 - 18)  SpO2: 96% (09 Oct 2020 17:42) (95% - 96%)    GENERAL: No acute distress  HEENT: PERRL, EOMI, MMM, no oropharyngeal lesions  NECK: supple, no stiffness, no JVD, no thyromegaly  PULM: respirations non-labored, clear to auscultation bilaterally, no rales, rhonchi, or wheezes  CV: regular rate and rhythm, no murmurs, gallops, or rubs  GI: abdomen soft, minimal RUQ TTP, nondistended, no masses felt, normal bowel sounds  MSK: strength 5/5 bilateral upper/lower extremities. No joint swelling, erythema, or warmth.  LYMPH: no anterior cervical, posterior cervical, supraclavicular, or inguinal lymphadenopathy  NEURO: A&Ox3, no tremors, sensation intact  SKIN: no significant rashes, lesions, or edema

## 2020-10-09 NOTE — H&P ADULT - ASSESSMENT
70 yo M with a PMH CHEL on CPAP, osteoarthritis, nephrolithiasis, Hepatitis B, HTN, HLD, and COVID who presents with right flank/abdominal pain, due to a liver lesion.    1) Liver lesion  - 3.2 x 4.4 x 3.3 cm necrotic subcapsular right hepatic lobe lesion with small amount of adjacent fluid based on outpatient CT report (wife has report)  - Cannot rule out abscess (given previous surgical history and adjacent fluid) vs malignant lesion (history of stable pulmonary nodules in the past and HBV)  - Spoke with GI ([place]) and IR ([place]). Per IR, would want further imaging to delineate lesion and determine plan (?drainage vs biopsy)  - Will therefore obtain MRCP  - Will make NPO after midnight in case of procedure. IR will follow up in AM  - Pain and nausea control  - History of HBV, check hepatitis panel  - Zosyn Q8H  - F/u blood cx    2) Dilated CBD  - No gallstones seen on CT report but with dilated CBD 16 mm at [facility]  - Check MRCP  - Per GI, will likely need ERCP after liver lesion evaluation  - Daily CMPs    3) History of nephrolithiasis  - C/w Allopurinol 100 mg QD  - Potassium citrate not on formulary    4) Osteoarthritis  - C/w Gabapentin 300 mg QD    5) HTN  - C/w Metoprolol succinate 50 mg QD    6) HLD  - C/w Atorvastatin equivalent dose of Pravastatin 40 mg QD    7) Prophylactic measure  - DVT PPX: IMPROVE score of 1, no pharmacological PPX needed, will give SCDs  - Diet: DASH, NPO after midnight  - Dispo: pending GI and IR evaluation, MRCP. Patient states if he does not have procedure tomorrow, he will likely not stay the rest of the weekend. I discussed the need for him to have a thorough evaluation, however, prior to discharge. 68 yo M with a PMH CHEL on CPAP, osteoarthritis, nephrolithiasis, Hepatitis B, HTN, HLD, and COVID who presents with right flank/abdominal pain, due to a liver lesion.    1) Liver lesion  - 3.2 x 4.4 x 3.3 cm necrotic subcapsular right hepatic lobe lesion with small amount of adjacent fluid based on outpatient CT report (wife has report)  - Cannot rule out abscess (given previous surgical history and adjacent fluid) vs malignant lesion (history of stable pulmonary nodules in the past and HBV)  - Spoke with GI ([place]) and IR ([place]). Per IR, would want further imaging to delineate lesion and determine plan (?drainage vs biopsy)  - Will therefore obtain MRCP  - Will make NPO after midnight in case of procedure. IR will follow up in AM  - Pain and nausea control  - History of HBV, check hepatitis panel  - Zosyn Q8H  - F/u blood cx    2) Common Bile Duct Dilatation  - No gallstones seen on CT report but with dilated CBD 16 mm at [facility]  - Check MRCP  - Per GI, will likely need ERCP after liver lesion evaluation  - Daily CMPs    3) History of nephrolithiasis  - C/w Allopurinol 100 mg QD  - Potassium citrate not on formulary    4) Osteoarthritis  - C/w Gabapentin 300 mg QD    5) HTN  - C/w Metoprolol succinate 50 mg QD    6) HLD  - C/w Atorvastatin equivalent dose of Pravastatin 40 mg QD    7) CHEL on CPAP  - Patient has his own equipment, will use QHS    8) Prophylactic measure  - DVT PPX: IMPROVE score of 1, no pharmacological PPX needed, will give SCDs  - Diet: DASH, NPO after midnight  - Dispo: pending GI and IR evaluation, MRCP. Patient states if he does not have procedure tomorrow, he will likely not stay the rest of the weekend. I discussed the need for him to have a thorough evaluation, however, prior to discharge.

## 2020-10-09 NOTE — H&P ADULT - NSICDXPASTMEDICALHX_GEN_ALL_CORE_FT
PAST MEDICAL HISTORY:  History of 2019 novel coronavirus disease (COVID-19)     HTN (hypertension)     Hyperlipemia     Nephrolithiasis     OA (osteoarthritis)     CHEL on CPAP

## 2020-10-10 ENCOUNTER — TRANSCRIPTION ENCOUNTER (OUTPATIENT)
Age: 70
End: 2020-10-10

## 2020-10-10 ENCOUNTER — INPATIENT (INPATIENT)
Facility: HOSPITAL | Age: 70
LOS: 3 days | Discharge: HOME | DRG: 444 | End: 2020-10-14
Attending: FAMILY MEDICINE | Admitting: FAMILY MEDICINE
Payer: COMMERCIAL

## 2020-10-10 VITALS
OXYGEN SATURATION: 95 % | HEART RATE: 72 BPM | SYSTOLIC BLOOD PRESSURE: 112 MMHG | DIASTOLIC BLOOD PRESSURE: 81 MMHG | RESPIRATION RATE: 18 BRPM

## 2020-10-10 VITALS
TEMPERATURE: 98 F | HEART RATE: 79 BPM | SYSTOLIC BLOOD PRESSURE: 128 MMHG | RESPIRATION RATE: 18 BRPM | DIASTOLIC BLOOD PRESSURE: 76 MMHG | OXYGEN SATURATION: 96 %

## 2020-10-10 DIAGNOSIS — Z98.89 OTHER SPECIFIED POSTPROCEDURAL STATES: Chronic | ICD-10-CM

## 2020-10-10 DIAGNOSIS — K76.9 LIVER DISEASE, UNSPECIFIED: ICD-10-CM

## 2020-10-10 DIAGNOSIS — Z98.890 OTHER SPECIFIED POSTPROCEDURAL STATES: Chronic | ICD-10-CM

## 2020-10-10 DIAGNOSIS — Z90.49 ACQUIRED ABSENCE OF OTHER SPECIFIED PARTS OF DIGESTIVE TRACT: Chronic | ICD-10-CM

## 2020-10-10 DIAGNOSIS — Z96.659 PRESENCE OF UNSPECIFIED ARTIFICIAL KNEE JOINT: Chronic | ICD-10-CM

## 2020-10-10 LAB
ALBUMIN SERPL ELPH-MCNC: 3.3 G/DL — SIGNIFICANT CHANGE UP (ref 3.3–5)
ALP SERPL-CCNC: 100 U/L — SIGNIFICANT CHANGE UP (ref 40–120)
ALT FLD-CCNC: 31 U/L — SIGNIFICANT CHANGE UP (ref 12–78)
ANION GAP SERPL CALC-SCNC: 9 MMOL/L — SIGNIFICANT CHANGE UP (ref 5–17)
AST SERPL-CCNC: 22 U/L — SIGNIFICANT CHANGE UP (ref 15–37)
BASOPHILS # BLD AUTO: 0.03 K/UL — SIGNIFICANT CHANGE UP (ref 0–0.2)
BASOPHILS NFR BLD AUTO: 0.3 % — SIGNIFICANT CHANGE UP (ref 0–2)
BILIRUB SERPL-MCNC: 1.7 MG/DL — HIGH (ref 0.2–1.2)
BUN SERPL-MCNC: 17 MG/DL — SIGNIFICANT CHANGE UP (ref 7–23)
CALCIUM SERPL-MCNC: 9.1 MG/DL — SIGNIFICANT CHANGE UP (ref 8.5–10.1)
CHLORIDE SERPL-SCNC: 104 MMOL/L — SIGNIFICANT CHANGE UP (ref 96–108)
CO2 SERPL-SCNC: 23 MMOL/L — SIGNIFICANT CHANGE UP (ref 22–31)
CREAT SERPL-MCNC: 0.96 MG/DL — SIGNIFICANT CHANGE UP (ref 0.5–1.3)
EOSINOPHIL # BLD AUTO: 0.16 K/UL — SIGNIFICANT CHANGE UP (ref 0–0.5)
EOSINOPHIL NFR BLD AUTO: 1.4 % — SIGNIFICANT CHANGE UP (ref 0–6)
GLUCOSE SERPL-MCNC: 114 MG/DL — HIGH (ref 70–99)
HAV IGM SER-ACNC: SIGNIFICANT CHANGE UP
HBV CORE IGM SER-ACNC: SIGNIFICANT CHANGE UP
HBV SURFACE AG SER-ACNC: SIGNIFICANT CHANGE UP
HCT VFR BLD CALC: 41.9 % — SIGNIFICANT CHANGE UP (ref 39–50)
HCV AB S/CO SERPL IA: 0.07 S/CO — SIGNIFICANT CHANGE UP (ref 0–0.99)
HCV AB SERPL-IMP: SIGNIFICANT CHANGE UP
HGB BLD-MCNC: 13.7 G/DL — SIGNIFICANT CHANGE UP (ref 13–17)
IMM GRANULOCYTES NFR BLD AUTO: 0.5 % — SIGNIFICANT CHANGE UP (ref 0–1.5)
LYMPHOCYTES # BLD AUTO: 18.6 % — SIGNIFICANT CHANGE UP (ref 13–44)
LYMPHOCYTES # BLD AUTO: 2.19 K/UL — SIGNIFICANT CHANGE UP (ref 1–3.3)
MAGNESIUM SERPL-MCNC: 2.4 MG/DL — SIGNIFICANT CHANGE UP (ref 1.6–2.6)
MCHC RBC-ENTMCNC: 30.1 PG — SIGNIFICANT CHANGE UP (ref 27–34)
MCHC RBC-ENTMCNC: 32.7 GM/DL — SIGNIFICANT CHANGE UP (ref 32–36)
MCV RBC AUTO: 92.1 FL — SIGNIFICANT CHANGE UP (ref 80–100)
MONOCYTES # BLD AUTO: 0.91 K/UL — HIGH (ref 0–0.9)
MONOCYTES NFR BLD AUTO: 7.7 % — SIGNIFICANT CHANGE UP (ref 2–14)
NEUTROPHILS # BLD AUTO: 8.43 K/UL — HIGH (ref 1.8–7.4)
NEUTROPHILS NFR BLD AUTO: 71.5 % — SIGNIFICANT CHANGE UP (ref 43–77)
PHOSPHATE SERPL-MCNC: 3.2 MG/DL — SIGNIFICANT CHANGE UP (ref 2.5–4.5)
PLATELET # BLD AUTO: 280 K/UL — SIGNIFICANT CHANGE UP (ref 150–400)
POTASSIUM SERPL-MCNC: 3.8 MMOL/L — SIGNIFICANT CHANGE UP (ref 3.5–5.3)
POTASSIUM SERPL-SCNC: 3.8 MMOL/L — SIGNIFICANT CHANGE UP (ref 3.5–5.3)
PROT SERPL-MCNC: 8.1 GM/DL — SIGNIFICANT CHANGE UP (ref 6–8.3)
RBC # BLD: 4.55 M/UL — SIGNIFICANT CHANGE UP (ref 4.2–5.8)
RBC # FLD: 13.6 % — SIGNIFICANT CHANGE UP (ref 10.3–14.5)
SARS-COV-2 IGG SERPL QL IA: POSITIVE
SARS-COV-2 IGM SERPL IA-ACNC: 3.31 INDEX — HIGH
SARS-COV-2 RNA SPEC QL NAA+PROBE: SIGNIFICANT CHANGE UP
SODIUM SERPL-SCNC: 136 MMOL/L — SIGNIFICANT CHANGE UP (ref 135–145)
WBC # BLD: 11.78 K/UL — HIGH (ref 3.8–10.5)
WBC # FLD AUTO: 11.78 K/UL — HIGH (ref 3.8–10.5)

## 2020-10-10 PROCEDURE — 99232 SBSQ HOSP IP/OBS MODERATE 35: CPT

## 2020-10-10 PROCEDURE — 74183 MRI ABD W/O CNTR FLWD CNTR: CPT | Mod: 26

## 2020-10-10 PROCEDURE — 99223 1ST HOSP IP/OBS HIGH 75: CPT

## 2020-10-10 RX ORDER — PIPERACILLIN AND TAZOBACTAM 4; .5 G/20ML; G/20ML
3.38 INJECTION, POWDER, LYOPHILIZED, FOR SOLUTION INTRAVENOUS EVERY 8 HOURS
Refills: 0 | Status: DISCONTINUED | OUTPATIENT
Start: 2020-10-10 | End: 2020-10-14

## 2020-10-10 RX ORDER — GABAPENTIN 400 MG/1
3 CAPSULE ORAL
Refills: 0 | DISCHARGE
Start: 2020-10-10

## 2020-10-10 RX ORDER — CHOLECALCIFEROL (VITAMIN D3) 125 MCG
1 CAPSULE ORAL
Qty: 0 | Refills: 0 | DISCHARGE

## 2020-10-10 RX ORDER — POTASSIUM CITRATE MONOHYDRATE 100 %
1 POWDER (GRAM) MISCELLANEOUS
Qty: 0 | Refills: 0 | DISCHARGE
Start: 2020-10-10

## 2020-10-10 RX ORDER — GABAPENTIN 400 MG/1
1 CAPSULE ORAL
Qty: 0 | Refills: 0 | DISCHARGE

## 2020-10-10 RX ORDER — POTASSIUM CITRATE MONOHYDRATE 100 %
10 POWDER (GRAM) MISCELLANEOUS
Refills: 0 | Status: DISCONTINUED | OUTPATIENT
Start: 2020-10-10 | End: 2020-10-14

## 2020-10-10 RX ORDER — ALLOPURINOL 300 MG
1 TABLET ORAL
Qty: 0 | Refills: 0 | DISCHARGE

## 2020-10-10 RX ORDER — GLUCOSAMINE/MSM/CHONDROITIN A 750-375MG
1 TABLET ORAL
Qty: 0 | Refills: 0 | DISCHARGE

## 2020-10-10 RX ORDER — UBIDECARENONE 100 MG
1 CAPSULE ORAL
Qty: 0 | Refills: 0 | DISCHARGE

## 2020-10-10 RX ORDER — ATORVASTATIN CALCIUM 80 MG/1
10 TABLET, FILM COATED ORAL AT BEDTIME
Refills: 0 | Status: DISCONTINUED | OUTPATIENT
Start: 2020-10-10 | End: 2020-10-14

## 2020-10-10 RX ORDER — CHOLECALCIFEROL (VITAMIN D3) 125 MCG
1000 CAPSULE ORAL
Qty: 0 | Refills: 0 | DISCHARGE
Start: 2020-10-10

## 2020-10-10 RX ORDER — GABAPENTIN 400 MG/1
300 CAPSULE ORAL DAILY
Refills: 0 | Status: DISCONTINUED | OUTPATIENT
Start: 2020-10-10 | End: 2020-10-14

## 2020-10-10 RX ORDER — POTASSIUM CITRATE MONOHYDRATE 100 %
10 POWDER (GRAM) MISCELLANEOUS
Refills: 0 | Status: DISCONTINUED | OUTPATIENT
Start: 2020-10-10 | End: 2020-10-10

## 2020-10-10 RX ORDER — METOPROLOL TARTRATE 50 MG
1 TABLET ORAL
Qty: 0 | Refills: 0 | DISCHARGE

## 2020-10-10 RX ORDER — CHOLECALCIFEROL (VITAMIN D3) 125 MCG
1000 CAPSULE ORAL DAILY
Refills: 0 | Status: DISCONTINUED | OUTPATIENT
Start: 2020-10-10 | End: 2020-10-14

## 2020-10-10 RX ORDER — ACETAMINOPHEN 500 MG
650 TABLET ORAL EVERY 6 HOURS
Refills: 0 | Status: DISCONTINUED | OUTPATIENT
Start: 2020-10-10 | End: 2020-10-14

## 2020-10-10 RX ORDER — ALLOPURINOL 300 MG
100 TABLET ORAL DAILY
Refills: 0 | Status: DISCONTINUED | OUTPATIENT
Start: 2020-10-10 | End: 2020-10-14

## 2020-10-10 RX ORDER — ALLOPURINOL 300 MG
1 TABLET ORAL
Qty: 0 | Refills: 0 | DISCHARGE
Start: 2020-10-10

## 2020-10-10 RX ORDER — SACCHAROMYCES BOULARDII 250 MG
250 POWDER IN PACKET (EA) ORAL
Refills: 0 | Status: DISCONTINUED | OUTPATIENT
Start: 2020-10-10 | End: 2020-10-14

## 2020-10-10 RX ORDER — OMEGA-3 ACID ETHYL ESTERS 1 G
1 CAPSULE ORAL
Qty: 0 | Refills: 0 | DISCHARGE

## 2020-10-10 RX ORDER — ATORVASTATIN CALCIUM 80 MG/1
1 TABLET, FILM COATED ORAL
Qty: 0 | Refills: 0 | DISCHARGE
Start: 2020-10-10

## 2020-10-10 RX ORDER — HYDROCHLOROTHIAZIDE 25 MG
1 TABLET ORAL
Qty: 0 | Refills: 0 | DISCHARGE
Start: 2020-10-10

## 2020-10-10 RX ORDER — ACETAMINOPHEN 500 MG
2 TABLET ORAL
Qty: 0 | Refills: 0 | DISCHARGE
Start: 2020-10-10

## 2020-10-10 RX ORDER — METOPROLOL TARTRATE 50 MG
50 TABLET ORAL DAILY
Refills: 0 | Status: DISCONTINUED | OUTPATIENT
Start: 2020-10-11 | End: 2020-10-14

## 2020-10-10 RX ORDER — ENOXAPARIN SODIUM 100 MG/ML
40 INJECTION SUBCUTANEOUS DAILY
Refills: 0 | Status: DISCONTINUED | OUTPATIENT
Start: 2020-10-10 | End: 2020-10-11

## 2020-10-10 RX ORDER — POTASSIUM CITRATE MONOHYDRATE 100 %
1 POWDER (GRAM) MISCELLANEOUS
Qty: 0 | Refills: 0 | DISCHARGE

## 2020-10-10 RX ORDER — ONDANSETRON 8 MG/1
4 TABLET, FILM COATED ORAL EVERY 6 HOURS
Refills: 0 | Status: DISCONTINUED | OUTPATIENT
Start: 2020-10-10 | End: 2020-10-14

## 2020-10-10 RX ORDER — INFLUENZA VIRUS VACCINE 15; 15; 15; 15 UG/.5ML; UG/.5ML; UG/.5ML; UG/.5ML
0.5 SUSPENSION INTRAMUSCULAR ONCE
Refills: 0 | Status: COMPLETED | OUTPATIENT
Start: 2020-10-10 | End: 2020-10-10

## 2020-10-10 RX ORDER — METOPROLOL TARTRATE 50 MG
0.5 TABLET ORAL
Qty: 0 | Refills: 0 | DISCHARGE
Start: 2020-10-10

## 2020-10-10 RX ORDER — PIPERACILLIN AND TAZOBACTAM 4; .5 G/20ML; G/20ML
3.38 INJECTION, POWDER, LYOPHILIZED, FOR SOLUTION INTRAVENOUS
Qty: 0 | Refills: 0 | DISCHARGE
Start: 2020-10-10

## 2020-10-10 RX ADMIN — GABAPENTIN 300 MILLIGRAM(S): 400 CAPSULE ORAL at 10:47

## 2020-10-10 RX ADMIN — ATORVASTATIN CALCIUM 10 MILLIGRAM(S): 80 TABLET, FILM COATED ORAL at 21:20

## 2020-10-10 RX ADMIN — Medication 1 TABLET(S): at 10:46

## 2020-10-10 RX ADMIN — PIPERACILLIN AND TAZOBACTAM 25 GRAM(S): 4; .5 INJECTION, POWDER, LYOPHILIZED, FOR SOLUTION INTRAVENOUS at 02:18

## 2020-10-10 RX ADMIN — Medication 1000 UNIT(S): at 10:47

## 2020-10-10 RX ADMIN — PIPERACILLIN AND TAZOBACTAM 25 GRAM(S): 4; .5 INJECTION, POWDER, LYOPHILIZED, FOR SOLUTION INTRAVENOUS at 10:46

## 2020-10-10 RX ADMIN — Medication 50 MILLIGRAM(S): at 10:47

## 2020-10-10 RX ADMIN — PIPERACILLIN AND TAZOBACTAM 25 GRAM(S): 4; .5 INJECTION, POWDER, LYOPHILIZED, FOR SOLUTION INTRAVENOUS at 21:20

## 2020-10-10 RX ADMIN — Medication 100 MILLIGRAM(S): at 10:47

## 2020-10-10 RX ADMIN — Medication 25 MILLIGRAM(S): at 10:47

## 2020-10-10 NOTE — DISCHARGE NOTE PROVIDER - HOSPITAL COURSE
69M.  admitted 10/09/20.  presented to ED c/o RUQ abdominal pain radiating to his R flank and back.  outpatient CT ordered by his PMD, Dr. Alejandra suggestive of a liver abscess.    liver abscess.  -10/10 MRCP, 4.4cm abscess posterior R subhepatic space, suggestive of dropped gallstone.  -follow BCx and continue Zosyn.    -GI recommendations noted.  -patient needs ERCP and possible IR drainage of abscess v. conservative management w/ ABx alone.    disposition.  -transfer to [facility] for ERCP.    communication.  -RN.  -IR.  -Radiology.  -GI.  -transfer center.  -accepting Hospitalist (Dr. Almonte) and GI @ [facility].
regular rate and rhythm

## 2020-10-10 NOTE — DISCHARGE NOTE NURSING/CASE MANAGEMENT/SOCIAL WORK - PATIENT PORTAL LINK FT
You can access the FollowMyHealth Patient Portal offered by [facility] by registering at the following website: [email or web address]. By joining [employer]’s FollowMyHealth portal, you will also be able to view your health information using other applications (apps) compatible with our system.

## 2020-10-10 NOTE — H&P ADULT - ASSESSMENT
69 years old male with PMH of Cholelithiasis / Choledocholithiasis s/p ERCP + Lap Cholecystectomy, Hepatitis B (in remission), Renal Calculi, HTN, HLD, Gout, Osteoarthritis, COVID-19 and Sleep Apnea on Nocturnal CPAP referred to [facility] yesterday by his PMD with liver lesion. As per patient, he was having RUQ pain for 2-3 weeks, went to PMD who ordered CT Abdomen which showed 3.2 x 4.4 x 3.3 cm necrotic subcapsular right hepatic lobe lesion with small amount of adjacent fluid. Had MRCP today which showed choledocholithiasis with mild biliary dilatation and 4.4 cm liver abscess in right subhepatic space. Seen by IR who recommended surgical evaluation. Seen by GI who discussed with Surgery and no surgical intervention was indicated. GI is recommending ERCP so he was transferred to [facility].  Patient is feeling nauseous at times. Denies vomiting or fever. No abdominal pain at this time.       1) Choledocholithiasis  - No signs of cholangitis  - ERCP on Monday  - GI will be notified (Dr. Alas is aware of the transfer for ERCP)    2) Liver Abscess  - Blood cultures  - Continue Zosyn 3.375 gm q 8 hours  - ID Consult     3) 69 years old male with PMH of Cholelithiasis / Choledocholithiasis s/p ERCP + Lap Cholecystectomy, Hepatitis B (in remission), Renal Calculi, HTN, HLD, Gout, Osteoarthritis, COVID-19 and Sleep Apnea on Nocturnal CPAP referred to [facility] yesterday by his PMD with liver lesion. As per patient, he was having RUQ pain for 2-3 weeks, went to PMD who ordered CT Abdomen which showed 3.2 x 4.4 x 3.3 cm necrotic subcapsular right hepatic lobe lesion with small amount of adjacent fluid. Had MRCP today which showed choledocholithiasis with mild biliary dilatation and 4.4 cm liver abscess in right subhepatic space. Seen by IR who recommended surgical evaluation. Seen by GI who discussed with Surgery and no surgical intervention was indicated. GI is recommending ERCP so he was transferred to [facility].  Patient is feeling nauseous at times. Denies vomiting or fever. No abdominal pain at this time.     1) Choledocholithiasis  - No signs of cholangitis  - ERCP on Monday  - GI will be notified (Dr. Alas is aware of the transfer for ERCP)    2) Liver Abscess  - Blood cultures  - Continue Zosyn 3.375 gm q 8 hours  - ID Consult     3) HTN  - Continue Metoprolol ER 50 mg daily    4) HLD  - Lipitor 10 mg at bedtime (takes Pravastatin 40 mg at home)    5) Gout  - Continue Allopurinol 100 mg daily    6) Neuropathy  - Continue Gabapentin 300 mg daily    7) Sleep Apnea  - Nocturnal and PRN CPAP (Patient's own equipment    DVT Prophylaxis -- Lovenox 40 mg SQ    Dispo: Home likely in 3-4 days

## 2020-10-10 NOTE — CONSULT NOTE ADULT - SUBJECTIVE AND OBJECTIVE BOX
Vascular & Interventional Radiology Consult    HPI: 69y Male with a PMH CHEL on CPAP, osteoarthritis, nephrolithiasis, Hepatitis B, HTN, HLD, and COVID who presents with right flank/abdominal pain. He has had the pain for about 2-3 weeks, achy, non-radiating, 2-3/10 in severity, but can increase as high as 5/10 per H+P    Of note, he was admitted one year ago for choledocholithiasis and cholecystitis. He had an ERCP with sphincterotomy and laparoscopic cholecystectomy.    He states his hepatitis B is in remission.      Allergies: ANESTHESIA (Unknown)    Medications (Abx/Cardiac/Anticoagulation/Blood Products)  hydrochlorothiazide: 25 milliGRAM(s) Oral (10-10 @ 10:47)  metoprolol succinate ER: 50 milliGRAM(s) Oral (10-10 @ 10:47)  piperacillin/tazobactam IVPB.: 200 mL/Hr IV Intermittent (10-09 @ 17:11)  piperacillin/tazobactam IVPB..: 25 mL/Hr IV Intermittent (10-10 @ 10:46)    Data:  175.3  108  T(C): 36.8  HR: 72  BP: 113/62  RR: 18  SpO2: 94%    Exam  Deferred    -WBC 11.78 / HgB 13.7 / Hct 41.9 / Plt 280  -Na 136 / Cl 104 / BUN 17 / Glucose 114  -K 3.8 / CO2 23 / Cr 0.96  -ALT 31 / Alk Phos 100 / T.Bili 1.7  -INR1.18    Imaging: MRI reviewed with Dr. Rowell, Radiology.     A:   -69M with amara-hepatic abscess  Given imaging appearance and history, a perihepatic abscess in dependent position in a patient with s/p cholecystectomy and with current choledocholithiasis (>10 stones seen on MRI), findings are most consistent with dropped gallstone acting as nidus for infection, causing abscess  - Fluid component of abscess <3cm    P:  - For noncomplicated abscess <3cm, trial of conservative managment with IV ABx indicated  - For complicated abscess 2/2 dropped gallstone, regardless of size, surgical stone extraction is definitive, if patient is a candidate     Vascular & Interventional Radiology Consult    HPI: 69y Male with a PMH CHEL on CPAP, osteoarthritis, nephrolithiasis, Hepatitis B, HTN, HLD, and COVID who presents with right flank/abdominal pain. He has had the pain for about 2-3 weeks, achy, non-radiating, 2-3/10 in severity, but can increase as high as 5/10 per H+P    Of note, he was admitted one year ago for choledocholithiasis and cholecystitis. He had an ERCP with sphincterotomy and laparoscopic cholecystectomy.    He states his hepatitis B is in remission.      Allergies: ANESTHESIA (Unknown)    Medications (Abx/Cardiac/Anticoagulation/Blood Products)  hydrochlorothiazide: 25 milliGRAM(s) Oral (10-10 @ 10:47)  metoprolol succinate ER: 50 milliGRAM(s) Oral (10-10 @ 10:47)  piperacillin/tazobactam IVPB.: 200 mL/Hr IV Intermittent (10-09 @ 17:11)  piperacillin/tazobactam IVPB..: 25 mL/Hr IV Intermittent (10-10 @ 10:46)    Data:  175.3  108  T(C): 36.8  HR: 72  BP: 113/62  RR: 18  SpO2: 94%    Exam  Deferred    -WBC 11.78 / HgB 13.7 / Hct 41.9 / Plt 280  -Na 136 / Cl 104 / BUN 17 / Glucose 114  -K 3.8 / CO2 23 / Cr 0.96  -ALT 31 / Alk Phos 100 / T.Bili 1.7  -INR1.18    Imaging: MRI reviewed with Dr. Rowell, Radiology.     A:   -69M with amara-hepatic abscess in dependent position in a patient s/p cholecystectomy and with current choledocholithiasis (>10 stones seen on MRI)  - Findings are most consistent with dropped gallstone acting as nidus for infection, causing abscess  - Fluid component of abscess <3cm    P:  - For noncomplicated abscess <3cm, trial of conservative managment with IV ABx indicated  - For complicated abscess 2/2 dropped gallstone, regardless of size, surgical stone extraction is definitive, if patient is a candidate     Vascular & Interventional Radiology Consult    HPI: 69y Male with a PMH CHEL on CPAP, osteoarthritis, nephrolithiasis, Hepatitis B, HTN, HLD, and COVID who presents with right flank/abdominal pain. He has had the pain for about 2-3 weeks, achy, non-radiating, 2-3/10 in severity, but can increase as high as 5/10 per H+P    Of note, he was admitted one year ago for choledocholithiasis and cholecystitis. He had an ERCP with sphincterotomy and laparoscopic cholecystectomy.    He states his hepatitis B is in remission.      Allergies: ANESTHESIA (Unknown)    Medications (Abx/Cardiac/Anticoagulation/Blood Products)  hydrochlorothiazide: 25 milliGRAM(s) Oral (10-10 @ 10:47)  metoprolol succinate ER: 50 milliGRAM(s) Oral (10-10 @ 10:47)  piperacillin/tazobactam IVPB.: 200 mL/Hr IV Intermittent (10-09 @ 17:11)  piperacillin/tazobactam IVPB..: 25 mL/Hr IV Intermittent (10-10 @ 10:46)    Data:  175.3  108  T(C): 36.8  HR: 72  BP: 113/62  RR: 18  SpO2: 94%    Exam  Deferred    -WBC 11.78 / HgB 13.7 / Hct 41.9 / Plt 280  -Na 136 / Cl 104 / BUN 17 / Glucose 114  -K 3.8 / CO2 23 / Cr 0.96  -ALT 31 / Alk Phos 100 / T.Bili 1.7  -INR1.18    Imaging: MRI reviewed with Dr. Rowell, Radiology.     A:   - 69M with s/p cholecystectomy and with current choledocholithiasis (>10 stones seen on MRI), found to have amara-hepatic abscess posteriorl  - History and imaging findings are characteristic for dropped gallstone acting as nidus for infection, causing abscess    P:  - For noncomplicated abscess <3cm, trial of conservative managment with ABx indicated  - For complicated abscess containing dropped gallstone, regardless of size, surgical stone extraction is definitive, if patient is a candidate     Vascular & Interventional Radiology Consult    HPI: 69y Male with a PMH CHEL on CPAP, osteoarthritis, nephrolithiasis, Hepatitis B, HTN, HLD, and COVID who presents with right flank/abdominal pain. He has had the pain for about 2-3 weeks, achy, non-radiating, 2-3/10 in severity, but can increase as high as 5/10 per H+P    Of note, he was admitted one year ago for choledocholithiasis and cholecystitis. He had an ERCP with sphincterotomy and laparoscopic cholecystectomy.    He states his hepatitis B is in remission.      Allergies: ANESTHESIA (Unknown)    Medications (Abx/Cardiac/Anticoagulation/Blood Products)  hydrochlorothiazide: 25 milliGRAM(s) Oral (10-10 @ 10:47)  metoprolol succinate ER: 50 milliGRAM(s) Oral (10-10 @ 10:47)  piperacillin/tazobactam IVPB.: 200 mL/Hr IV Intermittent (10-09 @ 17:11)  piperacillin/tazobactam IVPB..: 25 mL/Hr IV Intermittent (10-10 @ 10:46)    Data:  175.3  108  T(C): 36.8  HR: 72  BP: 113/62  RR: 18  SpO2: 94%    Exam  Deferred    -WBC 11.78 / HgB 13.7 / Hct 41.9 / Plt 280  -Na 136 / Cl 104 / BUN 17 / Glucose 114  -K 3.8 / CO2 23 / Cr 0.96  -ALT 31 / Alk Phos 100 / T.Bili 1.7  -INR1.18    Imaging: MRI reviewed with Dr. Rowell, Radiology.     A:   - 69M sent in for abdominal pain, outside CT showing "necrotic mass"  - MRI performed at  showing this is a amara-hepatic abscess with characteristic location and appearance for dropped for dropped gallstone  - acting as nidus for infection - causing abscess   - Pt is s/p cholecystectomy and with current choledocholithiasis (>10 stones seen on MRI), so clinical history also characteristic for dropped stone  - Of note, fluid component of abscess <3cm    P:  - For complicated abscess containing dropped gallstone, regardless of size, surgical stone extraction is definitive, if patient is a candidate  - For noncomplicated abscess <3cm, trial of conservative managment with ABx indicated

## 2020-10-10 NOTE — CONSULT NOTE ADULT - ASSESSMENT
Imp:  1) Subhepatic abscess, presumably from dropped stone  2) CBD stones, but normal LFTs    Rec:  D/W surgery who feels surgery is not indicated  IR note also noted  As such, I recommend transfer for ERCP.  Unclear if patient would benefit from drainage of abscess but clinically well now so may be able to get away with Abx alone.

## 2020-10-10 NOTE — DISCHARGE NOTE PROVIDER - CARE PROVIDER_API CALL
Flakito Alejandra  FAMILY MEDICINE  [address] [place] [address]  Phone: [phone]  Fax: [phone]  Follow Up Time: Routine

## 2020-10-10 NOTE — PROVIDER CONTACT NOTE (OTHER) - SITUATION
Faxed DC papers to PCP
Spoke with Dr. Tellez. MD aware of consult.
spoke with Edis from answering service regarding consultation

## 2020-10-10 NOTE — PROGRESS NOTE ADULT - SUBJECTIVE AND OBJECTIVE BOX
CC:  Patient is a 69y old  Male who presents with a chief complaint of liver lesion, ?abscess (09 Oct 2020 19:08)    SUBJECTIVE:     -c/o RUQ abdominal pain that radiates to his right flank and back.  -denies systemic symptoms such as fever, chills, myalgias or general malaise.    ROS:  all other review of systems are negative unless indicated above.    acetaminophen   Tablet .. 650 milliGRAM(s) Oral every 6 hours PRN  allopurinol 100 milliGRAM(s) Oral daily  atorvastatin 10 milliGRAM(s) Oral at bedtime  cholecalciferol 1000 Unit(s) Oral daily  gabapentin 300 milliGRAM(s) Oral daily  hydrochlorothiazide 25 milliGRAM(s) Oral daily  metoprolol succinate ER 50 milliGRAM(s) Oral daily  multivitamin 1 Tablet(s) Oral daily  ondansetron Injectable 4 milliGRAM(s) IV Push every 6 hours PRN  piperacillin/tazobactam IVPB.. 3.375 Gram(s) IV Intermittent every 8 hours  potassium citrate 10 milliEquivalent(s) Oral two times a day    T(C): 36.8 (10-10-20 @ 07:49), Max: 37.1 (10-09-20 @ 17:42)  HR: 72 (10-10-20 @ 10:48) (69 - 75)  BP: 113/62 (10-10-20 @ 10:48) (113/62 - 135/77)  RR: 18 (10-10-20 @ 10:48) (16 - 18)  SpO2: 94% (10-10-20 @ 10:48) (94% - 99%)    Constitutional: NAD.   HEENT: PERRL, EOMI, MMM.  Neck: Soft and supple, No carotid bruit, No JVD  Respiratory: Breath sounds are clear bilaterally, No wheezing, rales or rhonchi  Cardiovascular: S1 and S2, regular rate and rhythm, no murmur, rub or gallop.  Gastrointestinal: Bowel Sounds present, soft, nontender, nondistended, no guarding, no rebound, no mass.  Extremities: No peripheral edema  Vascular: 2+ peripheral pulses  Neurological: A/O x 3, no focal deficits  Musculoskeletal: 5/5 strength b/l upper and lower extremities  Skin:  no visible rashes.                         13.7   11.78 )-----------( 280      ( 10 Oct 2020 08:31 )             41.9     PT/INR - ( 09 Oct 2020 15:37 )   PT: 13.6 sec;   INR: 1.18 ratio         PTT - ( 09 Oct 2020 15:37 )  PTT:25.1 sec  10-10    136  |  104  |  17  ----------------------------<  114<H>  3.8   |  23  |  0.96    Ca    9.1      10 Oct 2020 08:31  Phos  3.2     10-10  Mg     2.4     10-10    TPro  8.1  /  Alb  3.3  /  TBili  1.7<H>  /  DBili  x   /  AST  22  /  ALT  31  /  AlkPhos  100  10-10      < from: MR MRCP w/wo IV Cont (10.10.20 @ 10:37) >  IMPRESSION:  Choledocholithiasis and mild biliary ductal dilatation.    4.4 cm abscess in the posterior right subhepatic space. Given the history of cholecystectomy and location of the abscess, dropped gallstones are a consideration.    < end of copied text >

## 2020-10-10 NOTE — H&P ADULT - HISTORY OF PRESENT ILLNESS
69 years old male with PMH of Cholelithiasis / Choledocholithiasis s/p ERCP + Lap Cholecystectomy, Hepatitis B (in remission), Renal Calculi, HTN, HLD, Gout, Osteoarthritis, COVID-19 and Sleep Apnea on Nocturnal CPAP referred to [facility] yesterday by his PMD with liver lesion. As per patient, he was having RUQ pain for 2-3 weeks, went to PMD who ordered CT Abdomen which showed 3.2 x 4.4 x 3.3 cm necrotic subcapsular right hepatic lobe lesion with small amount of adjacent fluid. Had MRCP today which showed choledocholithiasis with mild biliary dilatation and 4.4 cm liver abscess in right subhepatic space. Seen by IR who recommended surgical evaluation. Seen by GI who discussed with Surgery and no surgical intervention was indicated. GI is recommending ERCP so he was transferred to [facility].  Patient is feeling nauseous at times. Denies vomiting or fever. No abdominal pain at this time.

## 2020-10-10 NOTE — DISCHARGE NOTE PROVIDER - NSDCMRMEDTOKEN_GEN_ALL_CORE_FT
acetaminophen 325 mg oral tablet: 2 tab(s) orally every 6 hours, As needed, Temp greater or equal to 38C (100.4F), Mild Pain (1 - 3), Moderate Pain (4 - 6)  allopurinol 100 mg oral tablet: 1 tab(s) orally once a day  atorvastatin 10 mg oral tablet: 1 tab(s) orally once a day (at bedtime)  cholecalciferol oral tablet: 1000 unit(s) orally once a day  gabapentin 300 mg oral capsule: 1 cap(s) orally once a day  hydroCHLOROthiazide 25 mg oral tablet: 1 tab(s) orally once a day  metoprolol succinate 50 mg oral tablet, extended release: 1 tab(s) orally once a day  Multiple Vitamins oral tablet: 1 tab(s) orally once a day  piperacillin-tazobactam: 3.375 milligram(s) intravenous every 8 hours  potassium citrate 10 mEq oral tablet, extended release: 1 tab(s) orally 2 times a day

## 2020-10-10 NOTE — H&P ADULT - NSHPSOCIALHISTORY_GEN_ALL_CORE
Works as .  , lives with wife.  No smoking or illicit drug use.  Drinks alcohol very rarely for socializing.

## 2020-10-10 NOTE — DISCHARGE NOTE PROVIDER - NSDCCPCAREPLAN_GEN_ALL_CORE_FT
PRINCIPAL DISCHARGE DIAGNOSIS  Diagnosis: Liver abscess  Assessment and Plan of Treatment:       SECONDARY DISCHARGE DIAGNOSES  Diagnosis: Right upper quadrant abdominal pain  Assessment and Plan of Treatment:     Diagnosis: Calculus of bile duct  Assessment and Plan of Treatment:

## 2020-10-10 NOTE — CONSULT NOTE ADULT - SUBJECTIVE AND OBJECTIVE BOX
HPI:  70 yo M with a PMH CHEL on CPAP, osteoarthritis, nephrolithiasis, Hepatitis B, HTN, HLD, and COVID who presents with right flank/abdominal pain. He has had the pain for about 2-3 weeks, achy, non-radiating, 2-3/10 in severity, but can increase as high as 5/10. He feels it is different from prior kidney stone pains. He denies dysuria, hesitancy, or discoloration of urine. He denies nausea, vomiting, blood in his stool, diarrhea, constipation, CP, SOB, fevers, chills, rash, or swelling anywhere. He also denies weight changes. The pain is worse slightly when he lies on the right side or when he coughs. It is not affected by eating or BMs.    Of note, he was admitted one year ago for choledocholithiasis and cholecystitis. He had an ERCP with sphincterotomy and laparoscopic cholecystectomy.  He states his hepatitis B is in remission.    In the ED, he was given Zosyn 3.375 g IV x1. (09 Oct 2020 19:08)  ------------------  MRI with multiple CBD stones and a subhepatic abscess    PAST MEDICAL & SURGICAL HISTORY:  History of 2019 novel coronavirus disease (COVID-19)    Hyperlipemia    HTN (hypertension)    Nephrolithiasis    OA (osteoarthritis)    CHEL on CPAP    History of laparoscopic cholecystectomy    History of ERCP    S/P arthroscopic knee surgery    S/P TKR (total knee replacement)  bilateral        Home Medications:  allopurinol 100 mg oral tablet: 1 tab(s) orally once a day (09 Oct 2020 18:21)  cholecalciferol 1000 intl units (25 mcg) oral tablet: 1 tab(s) orally once a day (09 Oct 2020 18:21)  CoQ10 300 mg oral capsule: 1 cap(s) orally once a day (09 Oct 2020 18:21)  Fish Oil 1000 mg oral capsule: 1 cap(s) orally once a day (09 Oct 2020 18:21)  gabapentin 300 mg oral capsule: 1 cap(s) orally once a day (09 Oct 2020 18:21)  Glucosamine &amp; Chondroitin with MSM oral tablet: 1 tab(s) orally once a day (09 Oct 2020 18:21)  hydroCHLOROthiazide 25 mg oral tablet: 1 tab(s) orally once a day (09 Oct 2020 18:21)  Metoprolol Succinate ER 50 mg oral tablet, extended release: 1 tab(s) orally once a day (09 Oct 2020 18:21)  potassium citrate 10 mEq oral tablet, extended release: 1 tab(s) orally 2 times a day (09 Oct 2020 18:21)  pravastatin 40 mg oral tablet: 1 tab(s) orally once a day (09 Oct 2020 18:21)      MEDICATIONS  (STANDING):  allopurinol 100 milliGRAM(s) Oral daily  atorvastatin 10 milliGRAM(s) Oral at bedtime  cholecalciferol 1000 Unit(s) Oral daily  gabapentin 300 milliGRAM(s) Oral daily  hydrochlorothiazide 25 milliGRAM(s) Oral daily  metoprolol succinate ER 50 milliGRAM(s) Oral daily  multivitamin 1 Tablet(s) Oral daily  piperacillin/tazobactam IVPB.. 3.375 Gram(s) IV Intermittent every 8 hours  potassium citrate 10 milliEquivalent(s) Oral two times a day    MEDICATIONS  (PRN):  acetaminophen   Tablet .. 650 milliGRAM(s) Oral every 6 hours PRN Temp greater or equal to 38C (100.4F), Mild Pain (1 - 3), Moderate Pain (4 - 6)  ondansetron Injectable 4 milliGRAM(s) IV Push every 6 hours PRN Nausea and/or Vomiting      Allergies    ANESTHESIA (Unknown)  No Known Drug Allergies    Intolerances        SOCIAL HISTORY:    FAMILY HISTORY:  Family history of gallstones  mother   she  at age last 80&#x27;s    FH: heart disease  dad  at age high 70&#x27;s. First MI at age 49        ROS  As above  Otherwise unremarkable    Vital Signs Last 24 Hrs  T(C): 36.8 (10 Oct 2020 07:49), Max: 37.1 (09 Oct 2020 17:42)  T(F): 98.2 (10 Oct 2020 07:49), Max: 98.8 (09 Oct 2020 22:57)  HR: 72 (10 Oct 2020 10:48) (69 - 75)  BP: 113/62 (10 Oct 2020 10:48) (113/62 - 135/77)  BP(mean): 88 (10 Oct 2020 01:11) (74 - 90)  RR: 18 (10 Oct 2020 10:48) (16 - 18)  SpO2: 94% (10 Oct 2020 10:48) (94% - 99%)    Constitutional: NAD, well-developed  Respiratory: CTAB  Cardiovascular: S1 and S2, RRR  Gastrointestinal: BS+, soft, NT/ND  Extremities: No peripheral edema  Psychiatric: Normal mood, normal affect  Skin: No rashes    LABS:                        13.7   11.78 )-----------( 280      ( 10 Oct 2020 08:31 )             41.9     10-10    136  |  104  |  17  ----------------------------<  114<H>  3.8   |  23  |  0.96    Ca    9.1      10 Oct 2020 08:31  Phos  3.2     10-10  Mg     2.4     10-10    TPro  8.1  /  Alb  3.3  /  TBili  1.7<H>  /  DBili  x   /  AST  22  /  ALT  31  /  AlkPhos  100  10-10    PT/INR - ( 09 Oct 2020 15:37 )   PT: 13.6 sec;   INR: 1.18 ratio         PTT - ( 09 Oct 2020 15:37 )  PTT:25.1 sec  LIVER FUNCTIONS - ( 10 Oct 2020 08:31 )  Alb: 3.3 g/dL / Pro: 8.1 gm/dL / ALK PHOS: 100 U/L / ALT: 31 U/L / AST: 22 U/L / GGT: x             RADIOLOGY & ADDITIONAL STUDIES:

## 2020-10-10 NOTE — PROGRESS NOTE ADULT - ASSESSMENT
69M.  admitted 10/09/20.  presented to ED c/o RUQ abdominal pain radiating to his R flank and back.  outpatient CT ordered by his PMD, Dr. Alejandra suggestive of a liver abscess.    liver abscess.  -10/10 MRCP, 4.4cm abscess posterior R subhepatic space, suggestive of dropped gallstone.  -follow BCx and continue Zosyn.  ID consult.  -plan:  radiology to review outpatient imaging.  if a retained gallstone is identified, likely Sx + ABx.  if a stone is not observed, consider IR drainiage + ABx v. conservative management w/ ABx alone.  -Sx consult.  -ID consult.  -IR following.    communication.  -RN.  -IR.  -Radiology.

## 2020-10-10 NOTE — H&P ADULT - NSHPLABSRESULTS_GEN_ALL_CORE
LABS:                        13.7   11.78 )-----------( 280      ( 10 Oct 2020 08:31 )             41.9     10-10    136  |  104  |  17  ----------------------------<  114<H>  3.8   |  23  |  0.96    Ca    9.1      10 Oct 2020 08:31  Phos  3.2     10-10  Mg     2.4     10-10    TPro  8.1  /  Alb  3.3  /  TBili  1.7<H>  /  DBili  x   /  AST  22  /  ALT  31  /  AlkPhos  100  10-10    PT/INR - ( 09 Oct 2020 15:37 )   PT: 13.6 sec;   INR: 1.18 ratio       PTT - ( 09 Oct 2020 15:37 )  PTT:25.1 sec    MR MRCP w/wo IV Cont (10.10.20 @ 10:37)    Choledocholithiasis and mild biliary ductal dilatation.    4.4 cm abscess in the posterior right subhepatic space. Given the history of cholecystectomy and location of the abscess, dropped gallstones are a consideration.

## 2020-10-11 ENCOUNTER — TRANSCRIPTION ENCOUNTER (OUTPATIENT)
Age: 70
End: 2020-10-11

## 2020-10-11 LAB
A1C WITH ESTIMATED AVERAGE GLUCOSE RESULT: 5.8 % — HIGH (ref 4–5.6)
ALBUMIN SERPL ELPH-MCNC: 3.6 G/DL — SIGNIFICANT CHANGE UP (ref 3.3–5.2)
ALP SERPL-CCNC: 94 U/L — SIGNIFICANT CHANGE UP (ref 40–120)
ALT FLD-CCNC: 20 U/L — SIGNIFICANT CHANGE UP
ANION GAP SERPL CALC-SCNC: 13 MMOL/L — SIGNIFICANT CHANGE UP (ref 5–17)
AST SERPL-CCNC: 15 U/L — SIGNIFICANT CHANGE UP
BASOPHILS # BLD AUTO: 0.04 K/UL — SIGNIFICANT CHANGE UP (ref 0–0.2)
BASOPHILS NFR BLD AUTO: 0.4 % — SIGNIFICANT CHANGE UP (ref 0–2)
BILIRUB SERPL-MCNC: 1.2 MG/DL — SIGNIFICANT CHANGE UP (ref 0.4–2)
BUN SERPL-MCNC: 15 MG/DL — SIGNIFICANT CHANGE UP (ref 8–20)
CALCIUM SERPL-MCNC: 9.2 MG/DL — SIGNIFICANT CHANGE UP (ref 8.6–10.2)
CHLORIDE SERPL-SCNC: 98 MMOL/L — SIGNIFICANT CHANGE UP (ref 98–107)
CO2 SERPL-SCNC: 25 MMOL/L — SIGNIFICANT CHANGE UP (ref 22–29)
CREAT SERPL-MCNC: 0.92 MG/DL — SIGNIFICANT CHANGE UP (ref 0.5–1.3)
EOSINOPHIL # BLD AUTO: 0.22 K/UL — SIGNIFICANT CHANGE UP (ref 0–0.5)
EOSINOPHIL NFR BLD AUTO: 2.1 % — SIGNIFICANT CHANGE UP (ref 0–6)
ESTIMATED AVERAGE GLUCOSE: 120 MG/DL — HIGH (ref 68–114)
GLUCOSE SERPL-MCNC: 116 MG/DL — HIGH (ref 70–99)
HCT VFR BLD CALC: 42.2 % — SIGNIFICANT CHANGE UP (ref 39–50)
HGB BLD-MCNC: 14.1 G/DL — SIGNIFICANT CHANGE UP (ref 13–17)
IMM GRANULOCYTES NFR BLD AUTO: 0.6 % — SIGNIFICANT CHANGE UP (ref 0–1.5)
LYMPHOCYTES # BLD AUTO: 2.24 K/UL — SIGNIFICANT CHANGE UP (ref 1–3.3)
LYMPHOCYTES # BLD AUTO: 20.9 % — SIGNIFICANT CHANGE UP (ref 13–44)
MAGNESIUM SERPL-MCNC: 2.1 MG/DL — SIGNIFICANT CHANGE UP (ref 1.6–2.6)
MCHC RBC-ENTMCNC: 30.8 PG — SIGNIFICANT CHANGE UP (ref 27–34)
MCHC RBC-ENTMCNC: 33.4 GM/DL — SIGNIFICANT CHANGE UP (ref 32–36)
MCV RBC AUTO: 92.1 FL — SIGNIFICANT CHANGE UP (ref 80–100)
MONOCYTES # BLD AUTO: 0.94 K/UL — HIGH (ref 0–0.9)
MONOCYTES NFR BLD AUTO: 8.8 % — SIGNIFICANT CHANGE UP (ref 2–14)
NEUTROPHILS # BLD AUTO: 7.23 K/UL — SIGNIFICANT CHANGE UP (ref 1.8–7.4)
NEUTROPHILS NFR BLD AUTO: 67.2 % — SIGNIFICANT CHANGE UP (ref 43–77)
PLATELET # BLD AUTO: 279 K/UL — SIGNIFICANT CHANGE UP (ref 150–400)
POTASSIUM SERPL-MCNC: 3.9 MMOL/L — SIGNIFICANT CHANGE UP (ref 3.5–5.3)
POTASSIUM SERPL-SCNC: 3.9 MMOL/L — SIGNIFICANT CHANGE UP (ref 3.5–5.3)
PROCALCITONIN SERPL-MCNC: 0.08 NG/ML — SIGNIFICANT CHANGE UP (ref 0.02–0.1)
PROT SERPL-MCNC: 7.3 G/DL — SIGNIFICANT CHANGE UP (ref 6.6–8.7)
RBC # BLD: 4.58 M/UL — SIGNIFICANT CHANGE UP (ref 4.2–5.8)
RBC # FLD: 13.6 % — SIGNIFICANT CHANGE UP (ref 10.3–14.5)
SODIUM SERPL-SCNC: 136 MMOL/L — SIGNIFICANT CHANGE UP (ref 135–145)
WBC # BLD: 10.73 K/UL — HIGH (ref 3.8–10.5)
WBC # FLD AUTO: 10.73 K/UL — HIGH (ref 3.8–10.5)

## 2020-10-11 PROCEDURE — 99223 1ST HOSP IP/OBS HIGH 75: CPT

## 2020-10-11 PROCEDURE — 71046 X-RAY EXAM CHEST 2 VIEWS: CPT | Mod: 26

## 2020-10-11 PROCEDURE — 99222 1ST HOSP IP/OBS MODERATE 55: CPT

## 2020-10-11 PROCEDURE — 99233 SBSQ HOSP IP/OBS HIGH 50: CPT

## 2020-10-11 PROCEDURE — 99254 IP/OBS CNSLTJ NEW/EST MOD 60: CPT

## 2020-10-11 RX ORDER — INDOMETHACIN 50 MG
100 CAPSULE ORAL ONCE
Refills: 0 | Status: DISCONTINUED | OUTPATIENT
Start: 2020-10-12 | End: 2020-10-14

## 2020-10-11 RX ADMIN — GABAPENTIN 300 MILLIGRAM(S): 400 CAPSULE ORAL at 12:45

## 2020-10-11 RX ADMIN — PIPERACILLIN AND TAZOBACTAM 25 GRAM(S): 4; .5 INJECTION, POWDER, LYOPHILIZED, FOR SOLUTION INTRAVENOUS at 05:17

## 2020-10-11 RX ADMIN — Medication 1 TABLET(S): at 12:45

## 2020-10-11 RX ADMIN — PIPERACILLIN AND TAZOBACTAM 25 GRAM(S): 4; .5 INJECTION, POWDER, LYOPHILIZED, FOR SOLUTION INTRAVENOUS at 12:45

## 2020-10-11 RX ADMIN — PIPERACILLIN AND TAZOBACTAM 25 GRAM(S): 4; .5 INJECTION, POWDER, LYOPHILIZED, FOR SOLUTION INTRAVENOUS at 21:15

## 2020-10-11 RX ADMIN — Medication 250 MILLIGRAM(S): at 05:11

## 2020-10-11 RX ADMIN — ATORVASTATIN CALCIUM 10 MILLIGRAM(S): 80 TABLET, FILM COATED ORAL at 21:15

## 2020-10-11 RX ADMIN — Medication 50 MILLIGRAM(S): at 05:11

## 2020-10-11 RX ADMIN — Medication 1000 UNIT(S): at 12:45

## 2020-10-11 RX ADMIN — Medication 10 MILLIEQUIVALENT(S): at 05:11

## 2020-10-11 RX ADMIN — Medication 10 MILLIEQUIVALENT(S): at 17:28

## 2020-10-11 RX ADMIN — Medication 250 MILLIGRAM(S): at 17:28

## 2020-10-11 RX ADMIN — Medication 100 MILLIGRAM(S): at 12:45

## 2020-10-11 NOTE — CONSULT NOTE ADULT - REASON FOR ADMISSION
Transferred from [facility] for ERCP
Transferred from [facility] for ERCP
Transferred from [facility] for ERCP

## 2020-10-11 NOTE — CONSULT NOTE ADULT - SUBJECTIVE AND OBJECTIVE BOX
PULMONARY CONSULT NOTE      SHWETA VELAZCOCAROLINA-[id number]    Patient is a 69y old  Male who presents with a chief complaint of Transferred from [facility] for ERCP (10 Oct 2020 18:21)      HISTORY OF PRESENT ILLNESS: Hx of CHEL and has been on cpap every night for years; he does not know pressure but previous notes in [facility] chart say he is on 9 cmH2O; was seeing Dr George in [place] but not seen in a while. Had issues after ERCP in 19 with difficulty arousing after the procedure; subsequent cholecystectomy went well postop as he was placed on CPAP and did well.  Also hx Hepatitis B (in remission), Renal Calculi, HTN, HLD, Gout, Osteoarthritis, COVID-19. Referred to [facility] yesterday by his PMD with liver lesion. As per patient, he was having RUQ pain for 2-3 weeks, went to PMD who ordered CT Abdomen which showed 3.2 x 4.4 x 3.3 cm necrotic subcapsular right hepatic lobe lesion with small amount of adjacent fluid. Had MRCP today which showed choledocholithiasis with mild biliary dilatation and 4.4 cm liver abscess in right subhepatic space. Seen by IR who recommended surgical evaluation. Seen by GI who discussed with Surgery and no surgical intervention was indicated. GI is recommending ERCP so he was transferred to [facility].  Patient is feeling nauseous at times. Denies vomiting or fever. No abdominal pain at this time.     Lifelong nonsmoker.     He has his cpap from home in the room with him.      MEDICATIONS  (STANDING):  allopurinol 100 milliGRAM(s) Oral daily  atorvastatin 10 milliGRAM(s) Oral at bedtime  cholecalciferol 1000 Unit(s) Oral daily  gabapentin 300 milliGRAM(s) Oral daily  influenza   Vaccine 0.5 milliLiter(s) IntraMuscular once  metoprolol succinate ER 50 milliGRAM(s) Oral daily  multivitamin 1 Tablet(s) Oral daily  piperacillin/tazobactam IVPB.. 3.375 Gram(s) IV Intermittent every 8 hours  potassium citrate 10 milliEquivalent(s) Oral two times a day  saccharomyces boulardii 250 milliGRAM(s) Oral two times a day      MEDICATIONS  (PRN):  acetaminophen   Tablet .. 650 milliGRAM(s) Oral every 6 hours PRN Temp greater or equal to 38C (100.4F), Mild Pain (1 - 3)  ondansetron Injectable 4 milliGRAM(s) IV Push every 6 hours PRN Nausea and/or Vomiting      Allergies    ANESTHESIA (Unknown)  No Known Drug Allergies    Intolerances        PAST MEDICAL & SURGICAL HISTORY:  History of 2019 novel coronavirus disease (COVID-19)    Hyperlipemia    HTN (hypertension)    Nephrolithiasis    OA (osteoarthritis)    CHEL on CPAP    History of laparoscopic cholecystectomy    History of ERCP    S/P arthroscopic knee surgery    S/P TKR (total knee replacement)  bilateral        FAMILY HISTORY:  Family history of gallstones  mother   she  at age last 80&#x27;s    FH: heart disease  dad  at age high 70&#x27;s. First MI at age 49        SOCIAL HISTORY  Smoking History: never smoker    REVIEW OF SYSTEMS:    CONSTITUTIONAL:  No fevers, chills, sweats    HEENT:  Eyes:  No diplopia or blurred vision. ENT:  No earache, sore throat or runny nose.    CARDIOVASCULAR:  No pressure, squeezing, tightness, or heaviness about the chest; no palpitations.    RESPIRATORY: per HPI      GASTROINTESTINAL:  per HPI    GENITOURINARY:  No dysuria, frequency or urgency.    NEUROLOGIC:  No paresthesias, fasciculations, seizures or weakness.    PSYCHIATRIC:  No disorder of thought or mood.    Vital Signs Last 24 Hrs  T(C): 37.1 (11 Oct 2020 10:54), Max: 37.1 (11 Oct 2020 10:54)  T(F): 98.7 (11 Oct 2020 10:54), Max: 98.7 (11 Oct 2020 10:54)  HR: 70 (11 Oct 2020 10:54) (70 - 79)  BP: 104/64 (11 Oct 2020 10:54) (104/64 - 128/76)  BP(mean): --  RR: 18 (11 Oct 2020 10:54) (18 - 18)  SpO2: 98% (11 Oct 2020 10:54) (95% - 98%)    PHYSICAL EXAMINATION:    GENERAL: The patient is a well-developed,  n no apparent distress.     HEENT: Head is normocephalic and atraumatic.   Mucous membranes are moist.     NECK: Supple.     LUNGS: Clear to auscultation without wheezing, rales, or rhonchi. Respirations unlabored    HEART: Regular rate and rhythm      ABDOMEN: Soft, nontender, and nondistended.       EXTREMITIES: Without any cyanosis, clubbing, rash, lesions or edema.    NEUROLOGIC: Grossly intact.      LABS:                        14.1   10.73 )-----------( 279      ( 11 Oct 2020 08:18 )             42.2     10-11    136  |  98  |  15.0  ----------------------------<  116<H>  3.9   |  25.0  |  0.92    Ca    9.2      11 Oct 2020 08:18  Phos  3.2     10-10  Mg     2.1     10-11    TPro  7.3  /  Alb  3.6  /  TBili  1.2  /  DBili  x   /  AST  15  /  ALT  20  /  AlkPhos  94  10-11    PT/INR - ( 09 Oct 2020 15:37 )   PT: 13.6 sec;   INR: 1.18 ratio         PTT - ( 09 Oct 2020 15:37 )  PTT:25.1 sec  U       Procalcitonin, Serum: 0.08 ng/mL (10-11-20 @ 08:18)

## 2020-10-11 NOTE — CONSULT NOTE ADULT - SUBJECTIVE AND OBJECTIVE BOX
HISTORY OF PRESENT ILLNESS: This is a 69-year-old man with a past medical history significant for cholelithiasis and choledocholithiasis s/p ERCP + lap cholecystectomy, HBV, nephrolithiasis, HTN, HLD, Gout, Osteoarthritis, COVID-19 and CHEL on Nocturnal CPAP who was referred to [facility] yesterday by his PMD after being found to have a liver abscess on a CT that was obtained to evaluate for right flank pain.  He reports a 2-3 week history of right flank pain with fatigue.  The CT  a 3.2 x 4.4 x 3.3 cm necrotic subcapsular right hepatic lobe lesion with small amount of adjacent fluid.  Underwent an MRCP yesterday at  that showed choledocholithiasis with mild biliary dilatation and 4.4 cm liver abscess in right subhepatic space.  Seen by IR who recommended surgical evaluation.  Seen by GI who discussed with Surgery and no surgical intervention was indicated. GI is recommending ERCP so he was transferred to [facility].  No abdominal pain at this time.  No jaundice per patient.    ROS: A 14-point review of systems was completed and was otherwise negative save what was reported in the HPI.    PAST MEDICAL/SURGICAL HISTORY:  History of 2019 novel coronavirus disease (COVID-19)  Hyperlipemia  HTN (hypertension)  Nephrolithiasis  OA (osteoarthritis)  CHEL on CPAP  History of laparoscopic cholecystectomy  History of ERCP  S/P arthroscopic knee surgery  S/P TKR (total knee replacement)  bilateral      SOCIAL HISTORY:  - TOBACCO: Denies  - ALCOHOL: Denies  - ILLICIT DRUG USE: Denies    FAMILY HISTORY:  Family history of gallstones,   FH: heart disease, first MI at age 47    HOME MEDICATIONS:  acetaminophen 325 mg oral tablet: 2 tab(s) orally every 6 hours, As needed, Temp greater or equal to 38C (100.4F), Mild Pain (1 - 3), Moderate Pain (4 - 6) (10 Oct 2020 14:27)  allopurinol 100 mg oral tablet: 1 tab(s) orally once a day (10 Oct 2020 14:27)  cholecalciferol oral tablet: 1000 unit(s) orally once a day (10 Oct 2020 14:27)  Co Q-10 100 mg oral capsule: 1 cap(s) orally once a day (10 Oct 2020 18:20)  gabapentin 300 mg oral capsule: 1 cap(s) orally once a day (10 Oct 2020 14:27)  hydroCHLOROthiazide 25 mg oral tablet: 1 tab(s) orally once a day (10 Oct 2020 14:27)  metoprolol succinate 50 mg oral tablet, extended release: 1 tab(s) orally once a day (10 Oct 2020 14:27)  Multiple Vitamins oral tablet: 1 tab(s) orally once a day (10 Oct 2020 14:27)  piperacillin-tazobactam: 3.375 milligram(s) intravenous every 8 hours (10 Oct 2020 14:27)  potassium citrate 10 mEq oral tablet, extended release: 1 tab(s) orally 2 times a day (10 Oct 2020 14:27)  pravastatin 40 mg oral tablet: 1 tab(s) orally once a day (10 Oct 2020 18:18)    INPATIENT MEDICATIONS:  MEDICATIONS  (STANDING):  allopurinol 100 milliGRAM(s) Oral daily  atorvastatin 10 milliGRAM(s) Oral at bedtime  cholecalciferol 1000 Unit(s) Oral daily  gabapentin 300 milliGRAM(s) Oral daily  influenza   Vaccine 0.5 milliLiter(s) IntraMuscular once  metoprolol succinate ER 50 milliGRAM(s) Oral daily  multivitamin 1 Tablet(s) Oral daily  piperacillin/tazobactam IVPB.. 3.375 Gram(s) IV Intermittent every 8 hours  potassium citrate 10 milliEquivalent(s) Oral two times a day  saccharomyces boulardii 250 milliGRAM(s) Oral two times a day    MEDICATIONS  (PRN):  acetaminophen   Tablet .. 650 milliGRAM(s) Oral every 6 hours PRN Temp greater or equal to 38C (100.4F), Mild Pain (1 - 3)  ondansetron Injectable 4 milliGRAM(s) IV Push every 6 hours PRN Nausea and/or Vomiting    ALLERGIES:  ANESTHESIA (Unknown)  No Known Drug Allergies    T(C): 37.1 (10-11-20 @ 10:54), Max: 37.1 (10-11-20 @ 10:54)  HR: 70 (10-11-20 @ 10:54) (70 - 79)  BP: 104/64 (10-11-20 @ 10:54) (104/64 - 128/76)  RR: 18 (10-11-20 @ 10:54) (18 - 18)  SpO2: 98% (10-11-20 @ 10:54) (95% - 98%)    10-10-20 @ 07:01  -  10-11-20 @ 07:00  --------------------------------------------------------  IN: 940 mL / OUT: 0 mL / NET: 940 mL    PHYSICAL EXAM:  Constitutional: Well-developed, well-nourished, in no apparent distress  Eyes: Sclerae anicteric, conjunctivae normal  ENMT: Mucus membranes moist, no oropharyngeal thrush noted  Neck: No thyroid nodules appreciated, no significant cervical or supraclavicular lymphadenopathy  Respiratory: Breathing nonlabored; clear to auscultation  Cardiovascular: Regular rate and rhythm  Gastrointestinal: Soft, nontender, nondistended, normoactive bowel sounds; no hepatosplenomegaly appreciated; no rebound tenderness or involuntary guarding  Extremities: No clubbing, cyanosis or edema  Neurological: Alert and oriented to person, place and time; no asterixis  Skin: No jaundice  Lymph Nodes: No significant lymphadenopathy  Musculoskeletal: No significant peripheral atrophy  Psychiatric: Affect and mood appropriate      LABS:             14.1   10.73 )-----------( 279      ( 10-11 @ 08:18 )             42.2                13.7   11.78 )-----------( 280      ( 10-10 @ 08:31 )             41.9                13.9   11.90 )-----------( 276      ( 10-09 @ 15:37 )             41.6       PT/INR - ( 09 Oct 2020 15:37 )   PT: 13.6 sec;   INR: 1.18 ratio         PTT - ( 09 Oct 2020 15:37 )  PTT:25.1 sec  10-11    136  |  98  |  15.0  ----------------------------<  116<H>  3.9   |  25.0  |  0.92    Ca    9.2      11 Oct 2020 08:18  Phos  3.2     10-10  Mg     2.1     10-11    TPro  7.3  /  Alb  3.6  /  TBili  1.2  /  DBili  x   /  AST  15  /  ALT  20  /  AlkPhos  94  10-11    LIVER FUNCTIONS - ( 11 Oct 2020 08:18 )  Alb: 3.6 g/dL / Pro: 7.3 g/dL / ALK PHOS: 94 U/L / ALT: 20 U/L / AST: 15 U/L / GGT: x               Urinalysis Basic - ( 09 Oct 2020 15:57 )    Color: Yellow / Appearance: Clear / S.010 / pH: x  Gluc: x / Ketone: Negative  / Bili: Negative / Urobili: Negative mg/dL   Blood: x / Protein: Negative mg/dL / Nitrite: Negative   Leuk Esterase: Negative / RBC: 0-2 /HPF / WBC 0-2   Sq Epi: x / Non Sq Epi: Occasional / Bacteria: Occasional    Culture - Blood (collected 09 Oct 2020 15:37)  Source: .Blood Blood-Venous  Preliminary Report (10 Oct 2020 22:01):    No growth to date.      IMAGING: I personally reviewed the MCRP, and I agree with the radiologist's interpretation as described below:    mr< from: MR MRCP w/wo IV Cont (10.10.20 @ 10:37) >  PROCEDURE:  MRI of the abdomen was performed with and without intravenous contrast.  IV Contrast: Gadavist. 10 cc administered, 0 cc discarded.  MRCP was performed.    FINDINGS:  LOWER CHEST: Within normal limits.    LIVER: 4.4 cm thick-walled mass with peripheral enhancement and central high T2 signal in the posterior dependent portion of the posterior right subhepatic space. No intraparenchymal liver lesion.   Steatosis.  BILE DUCTS: Mild biliary ductal dilatation, with the common duct, measuring 1.3 cm. Multiple (at least 10) stones in the distal duct, the largest measuring 1.1 cm.  GALLBLADDER: Cholecystectomy.  SPLEEN: Within normal limits.  PANCREAS: 2 mm cyst in the pancreatic body (4; 23). No ductal dilatation.  ADRENALS: Within normal limits.  KIDNEYS/URETERS: Bilateral renal cysts.    VISUALIZED PORTIONS:  BOWEL: Within normal limits.  PERITONEUM: No ascites.  VESSELS: Within normal limits.  RETROPERITONEUM/LYMPH NODES: No lymphadenopathy.  ABDOMINAL WALL: Within normal limits.  BONES: No suspicious lesion.    IMPRESSION:  Choledocholithiasis and mild biliary ductal dilatation.    4.4 cm abscess in the posterior right subhepatic space. Given the history of cholecystectomy and location of the abscess, dropped gallstones are a consideration.    < end of copied text >

## 2020-10-11 NOTE — PROGRESS NOTE ADULT - SUBJECTIVE AND OBJECTIVE BOX
Disorder of liver        HPI:  69 years old male with PMH of Cholelithiasis / Choledocholithiasis s/p ERCP + Lap Cholecystectomy, Hepatitis B (in remission), Renal Calculi, HTN, HLD, Gout, Osteoarthritis, COVID-19 and Sleep Apnea on Nocturnal CPAP referred to [facility] yesterday by his PMD with liver lesion. As per patient, he was having RUQ pain for 2-3 weeks, went to PMD who ordered CT Abdomen which showed 3.2 x 4.4 x 3.3 cm necrotic subcapsular right hepatic lobe lesion with small amount of adjacent fluid. Had MRCP today which showed choledocholithiasis with mild biliary dilatation and 4.4 cm liver abscess in right subhepatic space. Seen by IR who recommended surgical evaluation. Seen by GI who discussed with Surgery and no surgical intervention was indicated. GI is recommending ERCP so he was transferred to [facility].  Patient is feeling nauseous at times. Denies vomiting or fever. No abdominal pain at this time.     Interval History:  Patient was seen and examined at bedside around 9 am. Feeling better.  Denies abdominal pain, nausea, vomiting, chest pain, palpitations, shortness of breath, headache or dizziness.  Tolerating PO.     ROS:  As per interval history otherwise unremarkable.    PHYSICAL EXAM:  Vital Signs   T(C): 37.1 (11 Oct 2020 10:54), Max: 37.1 (11 Oct 2020 10:54)  T(F): 98.7 (11 Oct 2020 10:54), Max: 98.7 (11 Oct 2020 10:54)  HR: 70 (11 Oct 2020 10:54) (70 - 79)  BP: 104/64 (11 Oct 2020 10:54) (104/64 - 128/76)  RR: 18 (11 Oct 2020 10:54) (18 - 18)  SpO2: 98% (11 Oct 2020 10:54) (95% - 98%)  General: Well developed. Well nourished. No acute distress  HEENT: PERRLA. EOMI. Clear conjunctivae. Moist mucus membrane  Neck: Supple.   Chest: CTA bilaterally - no wheezing, rales or rhonchi.   Heart: Normal S1 & S2 with RRR.   Abdomen: Obese. Soft. Non-tender. + BS  Ext: No pedal edema. No calf tenderness   Neuro: AAO x 3. No focal deficit. No speech disorder.  Skin: Warm and Dry  Psychiatry: Normal mood and affect    I&O's Summary    10 Oct 2020 07:01  -  11 Oct 2020 07:00  --------------------------------------------------------  IN: 940 mL / OUT: 0 mL / NET: 940 mL    MEDICATIONS  (STANDING):  allopurinol 100 milliGRAM(s) Oral daily  atorvastatin 10 milliGRAM(s) Oral at bedtime  cholecalciferol 1000 Unit(s) Oral daily  gabapentin 300 milliGRAM(s) Oral daily  influenza   Vaccine 0.5 milliLiter(s) IntraMuscular once  metoprolol succinate ER 50 milliGRAM(s) Oral daily  multivitamin 1 Tablet(s) Oral daily  piperacillin/tazobactam IVPB.. 3.375 Gram(s) IV Intermittent every 8 hours  potassium citrate 10 milliEquivalent(s) Oral two times a day  saccharomyces boulardii 250 milliGRAM(s) Oral two times a day    MEDICATIONS  (PRN):  acetaminophen   Tablet .. 650 milliGRAM(s) Oral every 6 hours PRN Temp greater or equal to 38C (100.4F), Mild Pain (1 - 3)  ondansetron Injectable 4 milliGRAM(s) IV Push every 6 hours PRN Nausea and/or Vomiting    LABS:                        14.1   10.73 )-----------( 279      ( 11 Oct 2020 08:18 )             42.2     10-11    136  |  98  |  15.0  ----------------------------<  116<H>  3.9   |  25.0  |  0.92    Ca    9.2      11 Oct 2020 08:18  Phos  3.2     10-10  Mg     2.1     10-11    TPro  7.3  /  Alb  3.6  /  TBili  1.2  /  DBili  x   /  AST  15  /  ALT  20  /  AlkPhos  94  10-11    PT/INR - ( 09 Oct 2020 15:37 )   PT: 13.6 sec;   INR: 1.18 ratio       PTT - ( 09 Oct 2020 15:37 )  PTT:25.1 sec  Urinalysis Basic - ( 09 Oct 2020 15:57 )    Color: Yellow / Appearance: Clear / S.010 / pH: x  Gluc: x / Ketone: Negative  / Bili: Negative / Urobili: Negative mg/dL   Blood: x / Protein: Negative mg/dL / Nitrite: Negative   Leuk Esterase: Negative / RBC: 0-2 /HPF / WBC 0-2   Sq Epi: x / Non Sq Epi: Occasional / Bacteria: Occasional    RADIOLOGY & ADDITIONAL STUDIES:  MR MRCP w/wo IV Cont (10.10.20 @ 10:37)    Choledocholithiasis and mild biliary ductal dilatation.    4.4 cm abscess in the posterior right subhepatic space. Given the history of cholecystectomy and location of the abscess, dropped gallstones are a consideration.

## 2020-10-11 NOTE — CONSULT NOTE ADULT - ASSESSMENT
Patient with choledocholithiasis and liver abscess.  On broad spectrum antibiotics.  Will plan on ERCP tomorrow.  Recommend IR evaluation for liver abscess drain.

## 2020-10-11 NOTE — CONSULT NOTE ADULT - ASSESSMENT
69 years old male with PMH of Cholelithiasis / Choledocholithiasis s/p ERCP + Lap Cholecystectomy, Hepatitis B (in remission), Renal Calculi, HTN, HLD, Gout, Osteoarthritis, COVID-19 and Sleep Apnea on Nocturnal CPAP referred to [facility] yesterday by his PMD with liver lesion. As per patient, he was having RUQ pain for 2-3 weeks, went to PMD who ordered CT Abdomen which showed 3.2 x 4.4 x 3.3 cm necrotic subcapsular right hepatic lobe lesion with small amount of adjacent fluid. Had MRCP today which showed choledocholithiasis with mild biliary dilatation and 4.4 cm liver abscess in right subhepatic space. Seen by IR who recommended surgical evaluation. Seen by GI who discussed with Surgery and no surgical intervention was indicated. GI is recommending ERCP so he was transferred to [facility].    Liver abscess  Choledocholithiases  Leukocytosis      - Blood cultures   - agree with Ir eval for liver abscess   - plan for ERCP in am  - Continue zosyn   - Trend Fever  - Trend Leukocytosis      Will Follow

## 2020-10-11 NOTE — CONSULT NOTE ADULT - ASSESSMENT
-CHEL; on cpap -CHEL; on cpap  -Liver abscess; planned on ERCP  -Lifelong nonsmoker; no hx resp dz; abn CXR in 2019    RECC:  Repeat CXR. Would place patient on CPAP at 9 cmH2O postop. Can do BPAP 12/5 if not responding to CPAP.     Needs outpt pulm and sleep f/u with his doctor in [place].

## 2020-10-11 NOTE — PROGRESS NOTE ADULT - ASSESSMENT
69 years old male with PMH of Cholelithiasis / Choledocholithiasis s/p ERCP + Lap Cholecystectomy, Hepatitis B (in remission), Renal Calculi, HTN, HLD, Gout, Osteoarthritis, COVID-19 and Sleep Apnea on Nocturnal CPAP referred to [facility] yesterday by his PMD with liver lesion. As per patient, he was having RUQ pain for 2-3 weeks, went to PMD who ordered CT Abdomen which showed 3.2 x 4.4 x 3.3 cm necrotic subcapsular right hepatic lobe lesion with small amount of adjacent fluid. Had MRCP today which showed choledocholithiasis with mild biliary dilatation and 4.4 cm liver abscess in right subhepatic space. Seen by IR who recommended surgical evaluation. Seen by GI who discussed with Surgery and no surgical intervention was indicated. GI is recommending ERCP so he was transferred to [facility].  Patient is feeling nauseous at times. Denies vomiting or fever. No abdominal pain at this time.     1) Choledocholithiasis  - No signs of cholangitis  - ERCP on Monday. Medically optimized for procedure. No absolute contraindications.   - GI consulted (Dr. Alas was aware of the transfer for ERCP)    2) Liver Abscess  - Blood cultures  - Continue Zosyn 3.375 gm q 8 hours  - ID Consult     3) HTN  - Continue Metoprolol ER 50 mg daily  - Holding HCTZ 25 mg daily     4) HLD  - Lipitor 10 mg at bedtime (takes Pravastatin 40 mg at home)    5) Gout  - Continue Allopurinol 100 mg daily    6) Neuropathy  - Continue Gabapentin 300 mg daily    7) Sleep Apnea  - Nocturnal and PRN CPAP (Patient's own equipment    DVT Prophylaxis -- Lovenox 40 mg SQ    Dispo: Home likely in 3-4 days       69 years old male with PMH of Cholelithiasis / Choledocholithiasis s/p ERCP + Lap Cholecystectomy, Hepatitis B (in remission), Renal Calculi, HTN, HLD, Gout, Osteoarthritis, COVID-19 and Sleep Apnea on Nocturnal CPAP referred to [facility] yesterday by his PMD with liver lesion. As per patient, he was having RUQ pain for 2-3 weeks, went to PMD who ordered CT Abdomen which showed 3.2 x 4.4 x 3.3 cm necrotic subcapsular right hepatic lobe lesion with small amount of adjacent fluid. Had MRCP today which showed choledocholithiasis with mild biliary dilatation and 4.4 cm liver abscess in right subhepatic space. Seen by IR who recommended surgical evaluation. Seen by GI who discussed with Surgery and no surgical intervention was indicated. GI is recommending ERCP so he was transferred to [facility].  Patient is feeling nauseous at times. Denies vomiting or fever. No abdominal pain at this time.     1) Choledocholithiasis  - No signs of cholangitis  - ERCP on Monday. Medically optimized for procedure. No absolute contraindications.   - Pulmonary Consult appreciated for clearance.   - GI consulted (Dr. Alas was aware of the transfer for ERCP)    2) Liver Abscess  - Blood cultures  - Continue Zosyn 3.375 gm q 8 hours  - ID Consult     3) HTN  - Continue Metoprolol ER 50 mg daily  - Holding HCTZ 25 mg daily     4) HLD  - Lipitor 10 mg at bedtime (takes Pravastatin 40 mg at home)    5) Gout  - Continue Allopurinol 100 mg daily    6) Neuropathy  - Continue Gabapentin 300 mg daily    7) Sleep Apnea  - Nocturnal and PRN CPAP (Patient's own equipment)    8) Glucose Intolerance  - Lifestyle modification     DVT Prophylaxis -- VCD. Holding Lovenox today for ERCP tomorrow.     Dispo: Home likely in 48 hours.

## 2020-10-11 NOTE — CONSULT NOTE ADULT - SUBJECTIVE AND OBJECTIVE BOX
[facility]  INFECTIOUS DISEASES AND INTERNAL MEDICINE at [place]  =======================================================  Magdaleno Loja MD  Diplomates American Board of Internal Medicine and Infectious Diseases  Tel: [phone]      Fax: [phone]  =======================================================      [id number]  SHWETA VELAZCO is a 69y  Male     CC: Patient is a 69y old  Male who presents with a chief complaint of Transferred from [facility] for ERCP (11 Oct 2020 12:51)    HPI:  69 years old male with PMH of Cholelithiasis / Choledocholithiasis s/p ERCP + Lap Cholecystectomy, Hepatitis B (in remission), Renal Calculi, HTN, HLD, Gout, Osteoarthritis, COVID-19 and Sleep Apnea on Nocturnal CPAP referred to [facility] yesterday by his PMD with liver lesion. As per patient, he was having RUQ pain for 2-3 weeks, went to PMD who ordered CT Abdomen which showed 3.2 x 4.4 x 3.3 cm necrotic subcapsular right hepatic lobe lesion with small amount of adjacent fluid. Had MRCP today which showed choledocholithiasis with mild biliary dilatation and 4.4 cm liver abscess in right subhepatic space. Seen by IR who recommended surgical evaluation. Seen by GI who discussed with Surgery and no surgical intervention was indicated. GI is recommending ERCP so he was transferred to [facility].  Patient is feeling nauseous at times. Denies vomiting or fever. No abdominal pain at this time.            (10 Oct 2020 18:21)      PAST MEDICAL & SURGICAL HISTORY:  History of 2019 novel coronavirus disease (COVID-19)    Hyperlipemia    HTN (hypertension)    Nephrolithiasis    OA (osteoarthritis)    CHEL on CPAP    History of laparoscopic cholecystectomy    History of ERCP    S/P arthroscopic knee surgery    S/P TKR (total knee replacement)  bilateral        Social Hx:    FAMILY HISTORY:  Family history of gallstones  mother   she  at age last 80&#x27;s    FH: heart disease  dad  at age high 70&#x27;s. First MI at age 49        Allergies    ANESTHESIA (Unknown)  No Known Drug Allergies    Intolerances        MEDICATIONS  (STANDING):  allopurinol 100 milliGRAM(s) Oral daily  atorvastatin 10 milliGRAM(s) Oral at bedtime  cholecalciferol 1000 Unit(s) Oral daily  gabapentin 300 milliGRAM(s) Oral daily  influenza   Vaccine 0.5 milliLiter(s) IntraMuscular once  metoprolol succinate ER 50 milliGRAM(s) Oral daily  multivitamin 1 Tablet(s) Oral daily  piperacillin/tazobactam IVPB.. 3.375 Gram(s) IV Intermittent every 8 hours  potassium citrate 10 milliEquivalent(s) Oral two times a day  saccharomyces boulardii 250 milliGRAM(s) Oral two times a day    MEDICATIONS  (PRN):  acetaminophen   Tablet .. 650 milliGRAM(s) Oral every 6 hours PRN Temp greater or equal to 38C (100.4F), Mild Pain (1 - 3)  ondansetron Injectable 4 milliGRAM(s) IV Push every 6 hours PRN Nausea and/or Vomiting      ANTIMICROBIALS:  piperacillin/tazobactam IVPB.. 3.375 every 8 hours      OTHER MEDS: MEDICATIONS  (STANDING):  acetaminophen   Tablet .. 650 every 6 hours PRN  allopurinol 100 daily  atorvastatin 10 at bedtime  gabapentin 300 daily  influenza   Vaccine 0.5 once  metoprolol succinate ER 50 daily  ondansetron Injectable 4 every 6 hours PRN  potassium citrate 10 two times a day             REVIEW OF SYSTEMS:  CONSTITUTIONAL:  No Fever or chills  HEENT:  No diplopia or blurred vision.  No earache, sore throat or runny nose.  CARDIOVASCULAR:  No pressure, squeezing, strangling, tightness, heaviness or aching about the chest, neck, axilla or epigastrium.  RESPIRATORY:  No cough, shortness of breath  GASTROINTESTINAL:  No nausea, vomiting or diarrhea.  GENITOURINARY:  No dysuria, frequency or urgency. No Blood in urine  MUSCULOSKELETAL:  no joint aches, no muscle pain  SKIN:  No change in skin, hair or nails.  NEUROLOGIC:  No Headaches, seizures or weakness.  PSYCHIATRIC:  No disorder of thought or mood.  ENDOCRINE:  No heat or cold intolerance  HEMATOLOGICAL:  No easy bruising or bleeding.           I&O's Detail    10 Oct 2020 07:01  -  11 Oct 2020 07:00  --------------------------------------------------------  IN:    IV PiggyBack: 700 mL    Oral Fluid: 240 mL  Total IN: 940 mL    OUT:  Total OUT: 0 mL    Total NET: 940 mL            Physical Exam:  Vital Signs Last 24 Hrs  T(C): 37.1 (11 Oct 2020 10:54), Max: 37.1 (11 Oct 2020 10:54)  T(F): 98.7 (11 Oct 2020 10:54), Max: 98.7 (11 Oct 2020 10:54)  HR: 70 (11 Oct 2020 10:54) (70 - 79)  BP: 104/64 (11 Oct 2020 10:54) (104/64 - 128/76)  BP(mean): --  RR: 18 (11 Oct 2020 10:54) (18 - 18)  SpO2: 98% (11 Oct 2020 10:54) (95% - 98%)    GEN: NAD, pleasant  HEENT: normocephalic and atraumatic. EOMI. PERRL.  Anicteric  NECK: Supple.   LUNGS: Clear to auscultation.  HEART: Regular rate and rhythm without murmur.  ABDOMEN: Soft, nontender, and nondistended.  Positive bowel sounds.    : No CVA tenderness  EXTREMITIES: Without any edema.  MSK: No joint swelling  NEUROLOGIC: Cranial nerves II through XII are grossly intact. No Focal Deficits  PSYCHIATRIC: Appropriate affect .  SKIN: No Rash        Labs:      Radiology:   [facility]  INFECTIOUS DISEASES AND INTERNAL MEDICINE at [place]  =======================================================  Magdaleno Loja MD  Diplomates American Board of Internal Medicine and Infectious Diseases  Tel: [phone]      Fax: [phone]  =======================================================      N-[id number]  SHWETA VELAZCO is a 69y  Male     CC: Patient is a 69y old  Male who presents with a chief complaint of Transferred from [facility] for ERCP (11 Oct 2020 12:51)    HPI:  69 years old male with PMH of Cholelithiasis / Choledocholithiasis s/p ERCP + Lap Cholecystectomy, Hepatitis B (in remission), Renal Calculi, HTN, HLD, Gout, Osteoarthritis, COVID-19 and Sleep Apnea on Nocturnal CPAP referred to [facility] yesterday by his PMD with liver lesion. As per patient, he was having RUQ pain for 2-3 weeks, went to PMD who ordered CT Abdomen which showed 3.2 x 4.4 x 3.3 cm necrotic subcapsular right hepatic lobe lesion with small amount of adjacent fluid. Had MRCP today which showed choledocholithiasis with mild biliary dilatation and 4.4 cm liver abscess in right subhepatic space. Seen by IR who recommended surgical evaluation. Seen by GI who discussed with Surgery and no surgical intervention was indicated. GI is recommending ERCP so he was transferred to [facility].  Patient is feeling nauseous at times. Denies vomiting or fever. No abdominal pain at this time.            (10 Oct 2020 18:21)      PAST MEDICAL & SURGICAL HISTORY:  History of 2019 novel coronavirus disease (COVID-19)    Hyperlipemia    HTN (hypertension)    Nephrolithiasis    OA (osteoarthritis)    CHEL on CPAP    History of laparoscopic cholecystectomy    History of ERCP    S/P arthroscopic knee surgery    S/P TKR (total knee replacement)  bilateral        Social Hx:    FAMILY HISTORY:  Family history of gallstones  mother   she  at age last 80&#x27;s    FH: heart disease  dad  at age high 70&#x27;s. First MI at age 49        Allergies    ANESTHESIA (Unknown)  No Known Drug Allergies    Intolerances        MEDICATIONS  (STANDING):  allopurinol 100 milliGRAM(s) Oral daily  atorvastatin 10 milliGRAM(s) Oral at bedtime  cholecalciferol 1000 Unit(s) Oral daily  gabapentin 300 milliGRAM(s) Oral daily  influenza   Vaccine 0.5 milliLiter(s) IntraMuscular once  metoprolol succinate ER 50 milliGRAM(s) Oral daily  multivitamin 1 Tablet(s) Oral daily  piperacillin/tazobactam IVPB.. 3.375 Gram(s) IV Intermittent every 8 hours  potassium citrate 10 milliEquivalent(s) Oral two times a day  saccharomyces boulardii 250 milliGRAM(s) Oral two times a day    MEDICATIONS  (PRN):  acetaminophen   Tablet .. 650 milliGRAM(s) Oral every 6 hours PRN Temp greater or equal to 38C (100.4F), Mild Pain (1 - 3)  ondansetron Injectable 4 milliGRAM(s) IV Push every 6 hours PRN Nausea and/or Vomiting      ANTIMICROBIALS:  piperacillin/tazobactam IVPB.. 3.375 every 8 hours      OTHER MEDS: MEDICATIONS  (STANDING):  acetaminophen   Tablet .. 650 every 6 hours PRN  allopurinol 100 daily  atorvastatin 10 at bedtime  gabapentin 300 daily  influenza   Vaccine 0.5 once  metoprolol succinate ER 50 daily  ondansetron Injectable 4 every 6 hours PRN  potassium citrate 10 two times a day             REVIEW OF SYSTEMS:  CONSTITUTIONAL:  No Fever or chills  HEENT:  No diplopia or blurred vision.  No earache, sore throat or runny nose.  CARDIOVASCULAR:  No pressure, squeezing, strangling, tightness, heaviness or aching about the chest, neck, axilla or epigastrium.  RESPIRATORY:  No cough, shortness of breath  GASTROINTESTINAL:  No nausea, vomiting or diarrhea.  GENITOURINARY:  No dysuria, frequency or urgency. No Blood in urine  MUSCULOSKELETAL:  no joint aches, no muscle pain  SKIN:  No change in skin, hair or nails.  NEUROLOGIC:  No Headaches, seizures or weakness.  PSYCHIATRIC:  No disorder of thought or mood.  ENDOCRINE:  No heat or cold intolerance  HEMATOLOGICAL:  No easy bruising or bleeding.           I&O's Detail    10 Oct 2020 07:01  -  11 Oct 2020 07:00  --------------------------------------------------------  IN:    IV PiggyBack: 700 mL    Oral Fluid: 240 mL  Total IN: 940 mL    OUT:  Total OUT: 0 mL    Total NET: 940 mL            Physical Exam:  Vital Signs Last 24 Hrs  T(C): 37.1 (11 Oct 2020 10:54), Max: 37.1 (11 Oct 2020 10:54)  T(F): 98.7 (11 Oct 2020 10:54), Max: 98.7 (11 Oct 2020 10:54)  HR: 70 (11 Oct 2020 10:54) (70 - 79)  BP: 104/64 (11 Oct 2020 10:54) (104/64 - 128/76)  BP(mean): --  RR: 18 (11 Oct 2020 10:54) (18 - 18)  SpO2: 98% (11 Oct 2020 10:54) (95% - 98%)    GEN: NAD, pleasant  HEENT: normocephalic and atraumatic. EOMI. PERRL.  Anicteric  NECK: Supple.   LUNGS: Clear to auscultation.  HEART: Regular rate and rhythm without murmur.  ABDOMEN: Soft, nontender, and nondistended.  Positive bowel sounds.    : No CVA tenderness  EXTREMITIES: Without any edema. b/l TKR scars healed  MSK: No joint swelling  NEUROLOGIC: Cranial nerves II through XII are grossly intact. No Focal Deficits  PSYCHIATRIC: Appropriate affect .  SKIN: No Rash        Labs:                        14.1   10.73 )-----------( 279      ( 11 Oct 2020 08:18 )             42.2   10-11    136  |  98  |  15.0  ----------------------------<  116<H>  3.9   |  25.0  |  0.92    Ca    9.2      11 Oct 2020 08:18  Phos  3.2     10-10  Mg     2.1     10-11    TPro  7.3  /  Alb  3.6  /  TBili  1.2  /  DBili  x   /  AST  15  /  ALT  20  /  AlkPhos  94  10-11      Radiology:  < from: MR MRCP w/wo IV Cont (10.10.20 @ 10:37) >  IMPRESSION:  Choledocholithiasis and mild biliary ductal dilatation.    4.4 cm abscess in the posterior right subhepatic space. Given the history of cholecystectomy and location of the abscess, dropped gallstones are a consideration.        < end of copied text >

## 2020-10-12 LAB
ALBUMIN SERPL ELPH-MCNC: 3.6 G/DL — SIGNIFICANT CHANGE UP (ref 3.3–5.2)
ALP SERPL-CCNC: 96 U/L — SIGNIFICANT CHANGE UP (ref 40–120)
ALT FLD-CCNC: 19 U/L — SIGNIFICANT CHANGE UP
ANION GAP SERPL CALC-SCNC: 14 MMOL/L — SIGNIFICANT CHANGE UP (ref 5–17)
AST SERPL-CCNC: 15 U/L — SIGNIFICANT CHANGE UP
BASOPHILS # BLD AUTO: 0.03 K/UL — SIGNIFICANT CHANGE UP (ref 0–0.2)
BASOPHILS NFR BLD AUTO: 0.3 % — SIGNIFICANT CHANGE UP (ref 0–2)
BILIRUB DIRECT SERPL-MCNC: 0.2 MG/DL — SIGNIFICANT CHANGE UP (ref 0–0.3)
BILIRUB INDIRECT FLD-MCNC: 0.6 MG/DL — SIGNIFICANT CHANGE UP (ref 0.2–1)
BILIRUB SERPL-MCNC: 0.8 MG/DL — SIGNIFICANT CHANGE UP (ref 0.4–2)
BUN SERPL-MCNC: 17 MG/DL — SIGNIFICANT CHANGE UP (ref 8–20)
CALCIUM SERPL-MCNC: 9 MG/DL — SIGNIFICANT CHANGE UP (ref 8.6–10.2)
CHLORIDE SERPL-SCNC: 101 MMOL/L — SIGNIFICANT CHANGE UP (ref 98–107)
CO2 SERPL-SCNC: 24 MMOL/L — SIGNIFICANT CHANGE UP (ref 22–29)
CREAT SERPL-MCNC: 0.77 MG/DL — SIGNIFICANT CHANGE UP (ref 0.5–1.3)
EOSINOPHIL # BLD AUTO: 0.24 K/UL — SIGNIFICANT CHANGE UP (ref 0–0.5)
EOSINOPHIL NFR BLD AUTO: 2.3 % — SIGNIFICANT CHANGE UP (ref 0–6)
GLUCOSE SERPL-MCNC: 136 MG/DL — HIGH (ref 70–99)
HCT VFR BLD CALC: 41.1 % — SIGNIFICANT CHANGE UP (ref 39–50)
HGB BLD-MCNC: 13.7 G/DL — SIGNIFICANT CHANGE UP (ref 13–17)
IMM GRANULOCYTES NFR BLD AUTO: 0.7 % — SIGNIFICANT CHANGE UP (ref 0–1.5)
INR BLD: 1.13 RATIO — SIGNIFICANT CHANGE UP (ref 0.88–1.16)
LYMPHOCYTES # BLD AUTO: 2.59 K/UL — SIGNIFICANT CHANGE UP (ref 1–3.3)
LYMPHOCYTES # BLD AUTO: 24.5 % — SIGNIFICANT CHANGE UP (ref 13–44)
MAGNESIUM SERPL-MCNC: 2.1 MG/DL — SIGNIFICANT CHANGE UP (ref 1.6–2.6)
MCHC RBC-ENTMCNC: 30.9 PG — SIGNIFICANT CHANGE UP (ref 27–34)
MCHC RBC-ENTMCNC: 33.3 GM/DL — SIGNIFICANT CHANGE UP (ref 32–36)
MCV RBC AUTO: 92.6 FL — SIGNIFICANT CHANGE UP (ref 80–100)
MONOCYTES # BLD AUTO: 1 K/UL — HIGH (ref 0–0.9)
MONOCYTES NFR BLD AUTO: 9.5 % — SIGNIFICANT CHANGE UP (ref 2–14)
NEUTROPHILS # BLD AUTO: 6.65 K/UL — SIGNIFICANT CHANGE UP (ref 1.8–7.4)
NEUTROPHILS NFR BLD AUTO: 62.7 % — SIGNIFICANT CHANGE UP (ref 43–77)
PLATELET # BLD AUTO: 277 K/UL — SIGNIFICANT CHANGE UP (ref 150–400)
POTASSIUM SERPL-MCNC: 3.6 MMOL/L — SIGNIFICANT CHANGE UP (ref 3.5–5.3)
POTASSIUM SERPL-SCNC: 3.6 MMOL/L — SIGNIFICANT CHANGE UP (ref 3.5–5.3)
PROT SERPL-MCNC: 7 G/DL — SIGNIFICANT CHANGE UP (ref 6.6–8.7)
PROTHROM AB SERPL-ACNC: 13 SEC — SIGNIFICANT CHANGE UP (ref 10.6–13.6)
RBC # BLD: 4.44 M/UL — SIGNIFICANT CHANGE UP (ref 4.2–5.8)
RBC # FLD: 13.2 % — SIGNIFICANT CHANGE UP (ref 10.3–14.5)
SARS-COV-2 IGG SERPL QL IA: POSITIVE
SARS-COV-2 IGM SERPL IA-ACNC: 99 INDEX — HIGH
SODIUM SERPL-SCNC: 139 MMOL/L — SIGNIFICANT CHANGE UP (ref 135–145)
WBC # BLD: 10.58 K/UL — HIGH (ref 3.8–10.5)
WBC # FLD AUTO: 10.58 K/UL — HIGH (ref 3.8–10.5)

## 2020-10-12 PROCEDURE — 43262 ENDO CHOLANGIOPANCREATOGRAPH: CPT | Mod: 59

## 2020-10-12 PROCEDURE — 99233 SBSQ HOSP IP/OBS HIGH 50: CPT

## 2020-10-12 PROCEDURE — 43265 ERCP LITHOTRIPSY CALCULI: CPT

## 2020-10-12 PROCEDURE — 43274 ERCP DUCT STENT PLACEMENT: CPT | Mod: 59

## 2020-10-12 RX ORDER — SODIUM CHLORIDE 9 MG/ML
1000 INJECTION, SOLUTION INTRAVENOUS
Refills: 0 | Status: DISCONTINUED | OUTPATIENT
Start: 2020-10-12 | End: 2020-10-13

## 2020-10-12 RX ORDER — URSODIOL 250 MG/1
300 TABLET, FILM COATED ORAL THREE TIMES A DAY
Refills: 0 | Status: DISCONTINUED | OUTPATIENT
Start: 2020-10-12 | End: 2020-10-14

## 2020-10-12 RX ADMIN — PIPERACILLIN AND TAZOBACTAM 25 GRAM(S): 4; .5 INJECTION, POWDER, LYOPHILIZED, FOR SOLUTION INTRAVENOUS at 05:26

## 2020-10-12 RX ADMIN — Medication 10 MILLIEQUIVALENT(S): at 22:23

## 2020-10-12 RX ADMIN — Medication 1 TABLET(S): at 11:13

## 2020-10-12 RX ADMIN — GABAPENTIN 300 MILLIGRAM(S): 400 CAPSULE ORAL at 11:13

## 2020-10-12 RX ADMIN — Medication 1000 UNIT(S): at 11:13

## 2020-10-12 RX ADMIN — Medication 10 MILLIEQUIVALENT(S): at 05:26

## 2020-10-12 RX ADMIN — Medication 250 MILLIGRAM(S): at 05:26

## 2020-10-12 RX ADMIN — URSODIOL 300 MILLIGRAM(S): 250 TABLET, FILM COATED ORAL at 22:23

## 2020-10-12 RX ADMIN — PIPERACILLIN AND TAZOBACTAM 25 GRAM(S): 4; .5 INJECTION, POWDER, LYOPHILIZED, FOR SOLUTION INTRAVENOUS at 22:23

## 2020-10-12 RX ADMIN — ONDANSETRON 4 MILLIGRAM(S): 8 TABLET, FILM COATED ORAL at 17:05

## 2020-10-12 RX ADMIN — PIPERACILLIN AND TAZOBACTAM 25 GRAM(S): 4; .5 INJECTION, POWDER, LYOPHILIZED, FOR SOLUTION INTRAVENOUS at 17:02

## 2020-10-12 RX ADMIN — ATORVASTATIN CALCIUM 10 MILLIGRAM(S): 80 TABLET, FILM COATED ORAL at 22:23

## 2020-10-12 RX ADMIN — Medication 250 MILLIGRAM(S): at 22:23

## 2020-10-12 RX ADMIN — Medication 50 MILLIGRAM(S): at 05:26

## 2020-10-12 RX ADMIN — Medication 100 MILLIGRAM(S): at 11:13

## 2020-10-12 NOTE — BRIEF OPERATIVE NOTE - COMMENTS
Clear liquid diet. Ursodiol 300 mg. PO TID. Aggressive IVF. Repeat ERCP with biliary stent removal and possible spyglass stone disruption in six to eight weeks

## 2020-10-12 NOTE — PROGRESS NOTE ADULT - SUBJECTIVE AND OBJECTIVE BOX
Disorder of liver    HPI:  69 years old male with PMH of Cholelithiasis / Choledocholithiasis s/p ERCP + Lap Cholecystectomy, Hepatitis B (in remission), Renal Calculi, HTN, HLD, Gout, Osteoarthritis, COVID-19 and Sleep Apnea on Nocturnal CPAP referred to [facility] yesterday by his PMD with liver lesion. As per patient, he was having RUQ pain for 2-3 weeks, went to PMD who ordered CT Abdomen which showed 3.2 x 4.4 x 3.3 cm necrotic subcapsular right hepatic lobe lesion with small amount of adjacent fluid. Had MRCP today which showed choledocholithiasis with mild biliary dilatation and 4.4 cm liver abscess in right subhepatic space. Seen by IR who recommended surgical evaluation. Seen by GI who discussed with Surgery and no surgical intervention was indicated. GI is recommending ERCP so he was transferred to [facility].  Patient is feeling nauseous at times. Denies vomiting or fever. No abdominal pain at this time.     Interval History:  Patient was seen and examined at bedside. Feeling OK.   Not happy as ERCP was delayed till afternoon.   Denies abdominal pain, nausea, vomiting, chest pain, palpitations, shortness of breath, headache or dizziness.  Tolerating PO.     ROS:  As per interval history otherwise unremarkable.    PHYSICAL EXAM:  Vital Signs  T(C): 36.7 (12 Oct 2020 11:25), Max: 36.7 (12 Oct 2020 11:25)  T(F): 98 (12 Oct 2020 11:25), Max: 98 (12 Oct 2020 11:25)  HR: 70 (12 Oct 2020 11:25) (67 - 72)  BP: 134/84 (12 Oct 2020 11:25) (112/64 - 139/71)  RR: 18 (12 Oct 2020 11:25) (18 - 18)  SpO2: 93% (12 Oct 2020 11:25) (93% - 96%)  General: Well developed. Well nourished. No acute distress  HEENT: PERRLA. EOMI. Clear conjunctivae. Moist mucus membrane  Neck: Supple.   Chest: CTA bilaterally - no wheezing, rales or rhonchi.   Heart: Normal S1 & S2 with RRR.   Abdomen: Obese. Soft. Non-tender. + BS  Ext: No pedal edema. No calf tenderness   Neuro: AAO x 3. No focal deficit. No speech disorder.  Skin: Warm and Dry  Psychiatry: Upset     MEDICATIONS  (STANDING):  allopurinol 100 milliGRAM(s) Oral daily  atorvastatin 10 milliGRAM(s) Oral at bedtime  cholecalciferol 1000 Unit(s) Oral daily  gabapentin 300 milliGRAM(s) Oral daily  indomethacin Suppository 100 milliGRAM(s) Rectal once  influenza   Vaccine 0.5 milliLiter(s) IntraMuscular once  metoprolol succinate ER 50 milliGRAM(s) Oral daily  multivitamin 1 Tablet(s) Oral daily  piperacillin/tazobactam IVPB.. 3.375 Gram(s) IV Intermittent every 8 hours  potassium citrate 10 milliEquivalent(s) Oral two times a day  saccharomyces boulardii 250 milliGRAM(s) Oral two times a day    MEDICATIONS  (PRN):  acetaminophen   Tablet .. 650 milliGRAM(s) Oral every 6 hours PRN Temp greater or equal to 38C (100.4F), Mild Pain (1 - 3)  ondansetron Injectable 4 milliGRAM(s) IV Push every 6 hours PRN Nausea and/or Vomiting    LABS:                        13.7   10.58 )-----------( 277      ( 12 Oct 2020 06:35 )             41.1     10-12    139  |  101  |  17.0  ----------------------------<  136<H>  3.6   |  24.0  |  0.77    Ca    9.0      12 Oct 2020 06:35  Mg     2.1     10-12    TPro  7.0  /  Alb  3.6  /  TBili  0.8  /  DBili  0.2  /  AST  15  /  ALT  19  /  AlkPhos  96  10-12    PT/INR - ( 12 Oct 2020 06:35 )   PT: 13.0 sec;   INR: 1.13 ratio      Blood Culture: 10-09 @ 15:37  Organism --  Gram Stain Blood -- Gram Stain --  Specimen Source .Blood Blood-Venous  Culture-Blood --    RADIOLOGY & ADDITIONAL STUDIES:  MR MRCP w/wo IV Cont (10.10.20 @ 10:37)    Choledocholithiasis and mild biliary ductal dilatation.    4.4 cm abscess in the posterior right subhepatic space. Given the history of cholecystectomy and location of the abscess, dropped gallstones are a consideration.

## 2020-10-12 NOTE — BRIEF OPERATIVE NOTE - NSICDXBRIEFPROCEDURE_GEN_ALL_CORE_FT
PROCEDURES:  ERCP, with lithrotripsy 12-Oct-2020 15:45:08  Kenneth Gloria  ERCP, with sphincterotomy and stent insertion 12-Oct-2020 15:40:57  Kenneth Gloria

## 2020-10-12 NOTE — BRIEF OPERATIVE NOTE - OPERATION/FINDINGS
Two large roughly 15 mm stones were seen in the distal CBD. CBD was dilated. A 12 mm. sphincterotomy was initially made and subsequently extended but despite extension and lithotripsy unable to remove these two large stones. A 10 Latvian by 7 mm. plastic biliary stent was successfully deployed. PD was never entered. Pt received two 50 mg. Indocin suppositories KS pre-procedure.

## 2020-10-12 NOTE — PROGRESS NOTE ADULT - ASSESSMENT
69 years old male with PMH of Cholelithiasis / Choledocholithiasis s/p ERCP + Lap Cholecystectomy, Hepatitis B (in remission), Renal Calculi, HTN, HLD, Gout, Osteoarthritis, COVID-19 and Sleep Apnea on Nocturnal CPAP referred to [facility] yesterday by his PMD with liver lesion. As per patient, he was having RUQ pain for 2-3 weeks, went to PMD who ordered CT Abdomen which showed 3.2 x 4.4 x 3.3 cm necrotic subcapsular right hepatic lobe lesion with small amount of adjacent fluid. Had MRCP today which showed choledocholithiasis with mild biliary dilatation and 4.4 cm liver abscess in right subhepatic space. Seen by IR who recommended surgical evaluation. Seen by GI who discussed with Surgery and no surgical intervention was indicated. GI is recommending ERCP so he was transferred to [facility].  Patient is feeling nauseous at times. Denies vomiting or fever. No abdominal pain at this time.     1) Choledocholithiasis  - No signs of cholangitis  - ERCP today. Medically optimized for procedure. No absolute contraindications.   - Pulmonary Consult appreciated for clearance.   - GI consult appreciated     2) Liver Abscess  - Blood cultures  - Continue Zosyn 3.375 gm q 8 hours  - Discussed with IR, plan for drainage tomorrow   - ID Consult appreciated    3) HTN  - Continue Metoprolol ER 50 mg daily  - Holding HCTZ 25 mg daily     4) HLD  - Lipitor 10 mg at bedtime (takes Pravastatin 40 mg at home)    5) Gout  - Continue Allopurinol 100 mg daily    6) Neuropathy  - Continue Gabapentin 300 mg daily    7) Sleep Apnea  - Nocturnal and PRN CPAP (Patient's own equipment)    8) Glucose Intolerance  - Lifestyle modification     DVT Prophylaxis -- VCD. Holding Lovenox for ERCP today and IR guided drainage of Liver Abscess tomorrow.     Dispo: Home likely in 2-3 days.

## 2020-10-12 NOTE — DIETITIAN INITIAL EVALUATION ADULT. - PERTINENT LABORATORY DATA
10-12 Na139 mmol/L Glu 136 mg/dL<H> K+ 3.6 mmol/L Cr  0.77 mg/dL BUN 17.0 mg/dL Phos n/a   Alb 3.6 g/dL PAB n/a

## 2020-10-13 DIAGNOSIS — K75.0 ABSCESS OF LIVER: ICD-10-CM

## 2020-10-13 DIAGNOSIS — K80.50 CALCULUS OF BILE DUCT WITHOUT CHOLANGITIS OR CHOLECYSTITIS WITHOUT OBSTRUCTION: ICD-10-CM

## 2020-10-13 LAB
ALBUMIN SERPL ELPH-MCNC: 3.6 G/DL — SIGNIFICANT CHANGE UP (ref 3.3–5.2)
ALP SERPL-CCNC: 175 U/L — HIGH (ref 40–120)
ALT FLD-CCNC: 49 U/L — HIGH
ANION GAP SERPL CALC-SCNC: 15 MMOL/L — SIGNIFICANT CHANGE UP (ref 5–17)
AST SERPL-CCNC: 35 U/L — SIGNIFICANT CHANGE UP
BASOPHILS # BLD AUTO: 0.02 K/UL — SIGNIFICANT CHANGE UP (ref 0–0.2)
BASOPHILS NFR BLD AUTO: 0.1 % — SIGNIFICANT CHANGE UP (ref 0–2)
BILIRUB SERPL-MCNC: 1.1 MG/DL — SIGNIFICANT CHANGE UP (ref 0.4–2)
BUN SERPL-MCNC: 17 MG/DL — SIGNIFICANT CHANGE UP (ref 8–20)
CALCIUM SERPL-MCNC: 9.1 MG/DL — SIGNIFICANT CHANGE UP (ref 8.6–10.2)
CHLORIDE SERPL-SCNC: 100 MMOL/L — SIGNIFICANT CHANGE UP (ref 98–107)
CO2 SERPL-SCNC: 23 MMOL/L — SIGNIFICANT CHANGE UP (ref 22–29)
CREAT SERPL-MCNC: 0.89 MG/DL — SIGNIFICANT CHANGE UP (ref 0.5–1.3)
EOSINOPHIL # BLD AUTO: 0.01 K/UL — SIGNIFICANT CHANGE UP (ref 0–0.5)
EOSINOPHIL NFR BLD AUTO: 0.1 % — SIGNIFICANT CHANGE UP (ref 0–6)
GLUCOSE SERPL-MCNC: 127 MG/DL — HIGH (ref 70–99)
HCT VFR BLD CALC: 41.7 % — SIGNIFICANT CHANGE UP (ref 39–50)
HGB BLD-MCNC: 13.7 G/DL — SIGNIFICANT CHANGE UP (ref 13–17)
IMM GRANULOCYTES NFR BLD AUTO: 0.8 % — SIGNIFICANT CHANGE UP (ref 0–1.5)
LYMPHOCYTES # BLD AUTO: 1.18 K/UL — SIGNIFICANT CHANGE UP (ref 1–3.3)
LYMPHOCYTES # BLD AUTO: 8.3 % — LOW (ref 13–44)
MCHC RBC-ENTMCNC: 30.3 PG — SIGNIFICANT CHANGE UP (ref 27–34)
MCHC RBC-ENTMCNC: 32.9 GM/DL — SIGNIFICANT CHANGE UP (ref 32–36)
MCV RBC AUTO: 92.3 FL — SIGNIFICANT CHANGE UP (ref 80–100)
MONOCYTES # BLD AUTO: 0.95 K/UL — HIGH (ref 0–0.9)
MONOCYTES NFR BLD AUTO: 6.7 % — SIGNIFICANT CHANGE UP (ref 2–14)
NEUTROPHILS # BLD AUTO: 11.96 K/UL — HIGH (ref 1.8–7.4)
NEUTROPHILS NFR BLD AUTO: 84 % — HIGH (ref 43–77)
PLATELET # BLD AUTO: 293 K/UL — SIGNIFICANT CHANGE UP (ref 150–400)
POTASSIUM SERPL-MCNC: 4.2 MMOL/L — SIGNIFICANT CHANGE UP (ref 3.5–5.3)
POTASSIUM SERPL-SCNC: 4.2 MMOL/L — SIGNIFICANT CHANGE UP (ref 3.5–5.3)
PROT SERPL-MCNC: 7.1 G/DL — SIGNIFICANT CHANGE UP (ref 6.6–8.7)
RBC # BLD: 4.52 M/UL — SIGNIFICANT CHANGE UP (ref 4.2–5.8)
RBC # FLD: 13.2 % — SIGNIFICANT CHANGE UP (ref 10.3–14.5)
SODIUM SERPL-SCNC: 138 MMOL/L — SIGNIFICANT CHANGE UP (ref 135–145)
WBC # BLD: 14.23 K/UL — HIGH (ref 3.8–10.5)
WBC # FLD AUTO: 14.23 K/UL — HIGH (ref 3.8–10.5)

## 2020-10-13 PROCEDURE — 10160 PNXR ASPIR ABSC HMTMA BULLA: CPT

## 2020-10-13 PROCEDURE — 99233 SBSQ HOSP IP/OBS HIGH 50: CPT

## 2020-10-13 PROCEDURE — 99233 SBSQ HOSP IP/OBS HIGH 50: CPT | Mod: GC

## 2020-10-13 PROCEDURE — 99232 SBSQ HOSP IP/OBS MODERATE 35: CPT

## 2020-10-13 PROCEDURE — 76942 ECHO GUIDE FOR BIOPSY: CPT | Mod: 26

## 2020-10-13 RX ORDER — HYDROCHLOROTHIAZIDE 25 MG
25 TABLET ORAL DAILY
Refills: 0 | Status: DISCONTINUED | OUTPATIENT
Start: 2020-10-13 | End: 2020-10-14

## 2020-10-13 RX ADMIN — URSODIOL 300 MILLIGRAM(S): 250 TABLET, FILM COATED ORAL at 21:34

## 2020-10-13 RX ADMIN — ATORVASTATIN CALCIUM 10 MILLIGRAM(S): 80 TABLET, FILM COATED ORAL at 21:34

## 2020-10-13 RX ADMIN — PIPERACILLIN AND TAZOBACTAM 25 GRAM(S): 4; .5 INJECTION, POWDER, LYOPHILIZED, FOR SOLUTION INTRAVENOUS at 21:34

## 2020-10-13 RX ADMIN — Medication 1000 UNIT(S): at 13:03

## 2020-10-13 RX ADMIN — Medication 250 MILLIGRAM(S): at 17:32

## 2020-10-13 RX ADMIN — URSODIOL 300 MILLIGRAM(S): 250 TABLET, FILM COATED ORAL at 06:39

## 2020-10-13 RX ADMIN — PIPERACILLIN AND TAZOBACTAM 25 GRAM(S): 4; .5 INJECTION, POWDER, LYOPHILIZED, FOR SOLUTION INTRAVENOUS at 06:39

## 2020-10-13 RX ADMIN — GABAPENTIN 300 MILLIGRAM(S): 400 CAPSULE ORAL at 13:02

## 2020-10-13 RX ADMIN — Medication 100 MILLIGRAM(S): at 13:03

## 2020-10-13 RX ADMIN — Medication 1 TABLET(S): at 13:02

## 2020-10-13 RX ADMIN — Medication 50 MILLIGRAM(S): at 06:39

## 2020-10-13 RX ADMIN — Medication 10 MILLIEQUIVALENT(S): at 17:32

## 2020-10-13 RX ADMIN — PIPERACILLIN AND TAZOBACTAM 25 GRAM(S): 4; .5 INJECTION, POWDER, LYOPHILIZED, FOR SOLUTION INTRAVENOUS at 13:03

## 2020-10-13 RX ADMIN — Medication 10 MILLIEQUIVALENT(S): at 06:39

## 2020-10-13 RX ADMIN — URSODIOL 300 MILLIGRAM(S): 250 TABLET, FILM COATED ORAL at 13:03

## 2020-10-13 RX ADMIN — Medication 250 MILLIGRAM(S): at 06:39

## 2020-10-13 NOTE — PROGRESS NOTE ADULT - ASSESSMENT
69 years old male with PMH of Cholelithiasis / Choledocholithiasis s/p ERCP + Lap Cholecystectomy, Hepatitis B (in remission), Renal Calculi, HTN, HLD, Gout, Osteoarthritis, COVID-19 and Sleep Apnea on Nocturnal CPAP referred to [facility] by his PMD with liver lesion. As per patient, he was having RUQ pain for 2-3 weeks, went to PMD who ordered CT Abdomen which showed 3.2 x 4.4 x 3.3 cm necrotic subcapsular right hepatic lobe lesion with small amount of adjacent fluid. Had MRCP which showed choledocholithiasis with mild biliary dilatation and 4.4 cm liver abscess in right subhepatic space. Seen by IR who recommended surgical evaluation. Seen by GI who discussed with Surgery and no surgical intervention was indicated. GI is recommending ERCP so he was transferred to [facility].    1) Choledocholithiasis  - No signs of cholangitis  - S/p ERCP 10/12/20 - unable to remove stones.    - GI following.  - Diet advanced and tolerating    2) Liver Abscess  - Blood cultures negative  - Continue Zosyn 3.375 gm q 8 hours  - S/p IR drainage 10/13/20   - ID following    3) HTN  - Continue Metoprolol ER 50 mg daily  - Holding HCTZ 25 mg daily     4) HLD  - Lipitor 10 mg at bedtime (takes Pravastatin 40 mg at home)    5) Gout  - Continue Allopurinol 100 mg daily    6) Neuropathy  - Continue Gabapentin 300 mg daily    7) Sleep Apnea  - Nocturnal and PRN CPAP (Patient's own equipment)    8) Glucose Intolerance  - Lifestyle modification     DVT Prophylaxis -- VCD. Will resume Lovenox 10/14/20     Dispo: Home likely in 2-3 days.

## 2020-10-13 NOTE — PROGRESS NOTE ADULT - SUBJECTIVE AND OBJECTIVE BOX
IR Post Procedure Note    Diagnosis: Rose hepatic collection    Procedure: Collection aspiration    : Leeroy Kennedy MD    Contrast: None    Anesthesia: 1% Lidocaine Subcutaneous, Sedation administered by Anesthesiology    Estimated Blood Loss: Less than 10cc    Specimens: Identified, Labeled, Confirmed and Sent to Lab. Adequacy of Specimen Confirmed by Cytopathology    Complications: No Immediate Complications    Anticoagulation: Resume without Restriction    Findings & Plan: 10cc pus and 5 cc blood aspirated to near completion w remainder of collection appearing as thrombus. No drain placed.      Please call Interventional Radiology with any questions, concerns, or issues.

## 2020-10-13 NOTE — PROGRESS NOTE ADULT - SUBJECTIVE AND OBJECTIVE BOX
CC: Transferred from [facility] for ERCP (13 Oct 2020 12:15)    HPI:  69 years old male with PMH of Cholelithiasis / Choledocholithiasis s/p ERCP + Lap Cholecystectomy, Hepatitis B (in remission), Renal Calculi, HTN, HLD, Gout, Osteoarthritis, COVID-19 and Sleep Apnea on Nocturnal CPAP referred to [facility] by his PMD with liver lesion. As per patient, he was having RUQ pain for 2-3 weeks, went to PMD who ordered CT Abdomen which showed 3.2 x 4.4 x 3.3 cm necrotic subcapsular right hepatic lobe lesion with small amount of adjacent fluid. Had MRCP which showed choledocholithiasis with mild biliary dilatation and 4.4 cm liver abscess in right subhepatic space. Seen by IR who recommended surgical evaluation. Seen by GI who discussed with Surgery and no surgical intervention was indicated. GI is recommending ERCP so he was transferred to [facility].      INTERVAL HPI/OVERNIGHT EVENTS: Patient seen and examined sitting up in the bed.  Patient s/p IR liver abscess drainage.  Patient denies any headache, dizziness, SOB, CP, abdominal pain, nausea, vomiting, dysuria.  Other ROS reviewed and are negative.    Vital Signs Last 24 Hrs  T(C): 36.1 (13 Oct 2020 06:37), Max: 36.7 (12 Oct 2020 16:57)  T(F): 96.9 (13 Oct 2020 06:37), Max: 98.1 (12 Oct 2020 16:57)  HR: 70 (13 Oct 2020 06:37) (60 - 70)  BP: 126/77 (13 Oct 2020 06:37) (124/75 - 132/81)  BP(mean): --  RR: 16 (13 Oct 2020 06:37) (16 - 18)  SpO2: 97% (13 Oct 2020 06:37) (90% - 97%)  I&O's Detail    12 Oct 2020 07:01  -  13 Oct 2020 07:00  --------------------------------------------------------  IN:    IV PiggyBack: 125 mL    Lactated Ringers: 600 mL  Total IN: 725 mL    OUT:    Voided (mL): 1 mL  Total OUT: 1 mL    Total NET: 724 mL      PHYSICAL EXAM:  GENERAL: NAD  HEAD:  Atraumatic, Normocephalic  NECK: Supple, No JVD, Normal thyroid  NERVOUS SYSTEM:  Alert & Oriented X3, Good concentration; Motor Strength 5/5 B/L upper and lower extremities  CHEST/LUNG: Clear to auscultation bilaterally; No rales, rhonchi, wheezing, or rubs  HEART: Regular rate and rhythm; No murmurs, rubs, or gallops  ABDOMEN: Soft, Nontender, Nondistended; Bowel sounds present  EXTREMITIES:  2+ Peripheral Pulses, No clubbing, cyanosis, or edema                                13.7   14.23 )-----------( 293      ( 13 Oct 2020 07:58 )             41.7     13 Oct 2020 07:58    138    |  100    |  17.0   ----------------------------<  127    4.2     |  23.0   |  0.89     Ca    9.1        13 Oct 2020 07:58  Mg     2.1       12 Oct 2020 06:35    TPro  7.1    /  Alb  3.6    /  TBili  1.1    /  DBili  x      /  AST  35     /  ALT  49     /  AlkPhos  175    13 Oct 2020 07:58    PT/INR - ( 12 Oct 2020 06:35 )   PT: 13.0 sec;   INR: 1.13 ratio        LIVER FUNCTIONS - ( 13 Oct 2020 07:58 )  Alb: 3.6 g/dL / Pro: 7.1 g/dL / ALK PHOS: 175 U/L / ALT: 49 U/L / AST: 35 U/L / GGT: x               MEDICATIONS  (STANDING):  allopurinol 100 milliGRAM(s) Oral daily  atorvastatin 10 milliGRAM(s) Oral at bedtime  cholecalciferol 1000 Unit(s) Oral daily  gabapentin 300 milliGRAM(s) Oral daily  indomethacin Suppository 100 milliGRAM(s) Rectal once  influenza   Vaccine 0.5 milliLiter(s) IntraMuscular once  lactated ringers. 1000 milliLiter(s) (150 mL/Hr) IV Continuous <Continuous>  metoprolol succinate ER 50 milliGRAM(s) Oral daily  multivitamin 1 Tablet(s) Oral daily  piperacillin/tazobactam IVPB.. 3.375 Gram(s) IV Intermittent every 8 hours  potassium citrate 10 milliEquivalent(s) Oral two times a day  saccharomyces boulardii 250 milliGRAM(s) Oral two times a day  ursodiol Capsule 300 milliGRAM(s) Oral three times a day    MEDICATIONS  (PRN):  acetaminophen   Tablet .. 650 milliGRAM(s) Oral every 6 hours PRN Temp greater or equal to 38C (100.4F), Mild Pain (1 - 3)  ondansetron Injectable 4 milliGRAM(s) IV Push every 6 hours PRN Nausea and/or Vomiting         CC: Transferred from [facility] for ERCP (13 Oct 2020 12:15)    HPI:  69 years old male with PMH of Cholelithiasis / Choledocholithiasis s/p ERCP + Lap Cholecystectomy, Hepatitis B (in remission), Renal Calculi, HTN, HLD, Gout, Osteoarthritis, COVID-19 and Sleep Apnea on Nocturnal CPAP referred to [facility] by his PMD with liver lesion. As per patient, he was having RUQ pain for 2-3 weeks, went to PMD who ordered CT Abdomen which showed 3.2 x 4.4 x 3.3 cm necrotic subcapsular right hepatic lobe lesion with small amount of adjacent fluid. Had MRCP which showed choledocholithiasis with mild biliary dilatation and 4.4 cm liver abscess in right subhepatic space. Seen by IR who recommended surgical evaluation. Seen by GI who discussed with Surgery and no surgical intervention was indicated. GI is recommending ERCP so he was transferred to [facility].      INTERVAL HPI/OVERNIGHT EVENTS: Patient seen and examined sitting up in the bed.  Patient s/p IR liver abscess drainage.  Patient denies any headache, dizziness, SOB, CP, abdominal pain, nausea, vomiting, dysuria.  Other ROS reviewed and are negative.    Vital Signs   T(C): 36.1 (13 Oct 2020 06:37), Max: 36.7 (12 Oct 2020 16:57)  T(F): 96.9 (13 Oct 2020 06:37), Max: 98.1 (12 Oct 2020 16:57)  HR: 70 (13 Oct 2020 06:37) (60 - 70)  BP: 126/77 (13 Oct 2020 06:37) (124/75 - 132/81)  RR: 16 (13 Oct 2020 06:37) (16 - 18)  SpO2: 97% (13 Oct 2020 06:37) (90% - 97%)  I&O's Detail    12 Oct 2020 07:01  -  13 Oct 2020 07:00  --------------------------------------------------------  IN:    IV PiggyBack: 125 mL    Lactated Ringers: 600 mL  Total IN: 725 mL    OUT:    Voided (mL): 1 mL  Total OUT: 1 mL    Total NET: 724 mL      PHYSICAL EXAM:  GENERAL: NAD  HEAD:  Atraumatic, Normocephalic  NECK: Supple, No JVD, Normal thyroid  NERVOUS SYSTEM:  Alert & Oriented X3, Good concentration; Motor Strength 5/5 B/L upper and lower extremities  CHEST/LUNG: Clear to auscultation bilaterally; No rales, rhonchi, wheezing, or rubs  HEART: Regular rate and rhythm; No murmurs, rubs, or gallops  ABDOMEN: Soft, Nontender, Nondistended; Bowel sounds present  EXTREMITIES:  2+ Peripheral Pulses, No clubbing, cyanosis, or edema                          13.7   14.23 )-----------( 293      ( 13 Oct 2020 07:58 )             41.7     13 Oct 2020 07:58    138    |  100    |  17.0   ----------------------------<  127    4.2     |  23.0   |  0.89     Ca    9.1        13 Oct 2020 07:58  Mg     2.1       12 Oct 2020 06:35    TPro  7.1    /  Alb  3.6    /  TBili  1.1    /  DBili  x      /  AST  35     /  ALT  49     /  AlkPhos  175    13 Oct 2020 07:58    PT/INR - ( 12 Oct 2020 06:35 )   PT: 13.0 sec;   INR: 1.13 ratio        LIVER FUNCTIONS - ( 13 Oct 2020 07:58 )  Alb: 3.6 g/dL / Pro: 7.1 g/dL / ALK PHOS: 175 U/L / ALT: 49 U/L / AST: 35 U/L / GGT: x           MEDICATIONS  (STANDING):  allopurinol 100 milliGRAM(s) Oral daily  atorvastatin 10 milliGRAM(s) Oral at bedtime  cholecalciferol 1000 Unit(s) Oral daily  gabapentin 300 milliGRAM(s) Oral daily  indomethacin Suppository 100 milliGRAM(s) Rectal once  influenza   Vaccine 0.5 milliLiter(s) IntraMuscular once  lactated ringers. 1000 milliLiter(s) (150 mL/Hr) IV Continuous <Continuous>  metoprolol succinate ER 50 milliGRAM(s) Oral daily  multivitamin 1 Tablet(s) Oral daily  piperacillin/tazobactam IVPB.. 3.375 Gram(s) IV Intermittent every 8 hours  potassium citrate 10 milliEquivalent(s) Oral two times a day  saccharomyces boulardii 250 milliGRAM(s) Oral two times a day  ursodiol Capsule 300 milliGRAM(s) Oral three times a day    MEDICATIONS  (PRN):  acetaminophen   Tablet .. 650 milliGRAM(s) Oral every 6 hours PRN Temp greater or equal to 38C (100.4F), Mild Pain (1 - 3)  ondansetron Injectable 4 milliGRAM(s) IV Push every 6 hours PRN Nausea and/or Vomiting

## 2020-10-13 NOTE — PROGRESS NOTE ADULT - SUBJECTIVE AND OBJECTIVE BOX
Chief Complaint: This is a 69y old man patient being seen in follow-up consultation for ERCP.    HPI / 24H events: Patient seeing and evaluated at bedside, reporting no complains, NPO for IR liver abscess drainage this am. S/P ERCP with sphincterotomy and stent placement. Denies nausea, vomiting, abdominal pain, chest pain, shortness of breath, hematemesis, hematochezia, melena.      ROS: A 14-point review of systems was reviewed and was otherwise negative save what was reported in the HPI.    PAST MEDICAL/SURGICAL HISTORY:  History of 2019 novel coronavirus disease (COVID-19)    Hyperlipemia    HTN (hypertension)    Nephrolithiasis    OA (osteoarthritis)    CHEL on CPAP    History of laparoscopic cholecystectomy    History of ERCP    S/P arthroscopic knee surgery    S/P TKR (total knee replacement)  bilateral      MEDICATIONS  (STANDING):  allopurinol 100 milliGRAM(s) Oral daily  atorvastatin 10 milliGRAM(s) Oral at bedtime  cholecalciferol 1000 Unit(s) Oral daily  gabapentin 300 milliGRAM(s) Oral daily  indomethacin Suppository 100 milliGRAM(s) Rectal once  influenza   Vaccine 0.5 milliLiter(s) IntraMuscular once  lactated ringers. 1000 milliLiter(s) (150 mL/Hr) IV Continuous <Continuous>  metoprolol succinate ER 50 milliGRAM(s) Oral daily  multivitamin 1 Tablet(s) Oral daily  piperacillin/tazobactam IVPB.. 3.375 Gram(s) IV Intermittent every 8 hours  potassium citrate 10 milliEquivalent(s) Oral two times a day  saccharomyces boulardii 250 milliGRAM(s) Oral two times a day  ursodiol Capsule 300 milliGRAM(s) Oral three times a day    MEDICATIONS  (PRN):  acetaminophen   Tablet .. 650 milliGRAM(s) Oral every 6 hours PRN Temp greater or equal to 38C (100.4F), Mild Pain (1 - 3)  ondansetron Injectable 4 milliGRAM(s) IV Push every 6 hours PRN Nausea and/or Vomiting    ANESTHESIA (Unknown)  No Known Drug Allergies    T(C): 36.1 (10-13-20 @ 06:37), Max: 36.7 (10-12-20 @ 11:25)  HR: 70 (10-13-20 @ 06:37) (60 - 70)  BP: 126/77 (10-13-20 @ 06:37) (124/75 - 134/84)  RR: 16 (10-13-20 @ 06:37) (16 - 18)  SpO2: 97% (10-13-20 @ 06:37) (90% - 97%)    I&O's Summary    12 Oct 2020 07:01  -  13 Oct 2020 07:00  --------------------------------------------------------  IN: 725 mL / OUT: 1 mL / NET: 724 mL      PHYSICAL EXAM:  Constitutional: Morbidly obese, angry looking, afebrile, in no apparent distress  Eyes: Sclerae anicteric, conjunctivae normal  ENMT: Mucus membranes moist, no oropharyngeal thrush noted  Respiratory: Breathing nonlabored; clear to auscultation  Cardiovascular: Regular rate and rhythm  Gastrointestinal: Soft, nontender, nondistended, normoactive bowel sounds.  Extremities: No clubbing, cyanosis or edema  Neurological: Alert and oriented to person, place and time; no asterixis  Skin: No jaundice  Musculoskeletal: No significant peripheral atrophy  Psychiatric: Affect and mood appropriate                   13.7   14.23 )-----------( 293      ( 10-13 @ 07:58 )             41.7                13.7   10.58 )-----------( 277      ( 10-12 @ 06:35 )             41.1                14.1   10.73 )-----------( 279      ( 10-11 @ 08:18 )             42.2       10-13    138  |  100  |  17.0  ----------------------------<  127<H>  4.2   |  23.0  |  0.89    Ca    9.1      13 Oct 2020 07:58  Mg     2.1     10-12    TPro  7.1  /  Alb  3.6  /  TBili  1.1  /  DBili  x   /  AST  35  /  ALT  49<H>  /  AlkPhos  175<H>  10-13    LIVER FUNCTIONS - ( 13 Oct 2020 07:58 )  Alb: 3.6 g/dL / Pro: 7.1 g/dL / ALK PHOS: 175 U/L / ALT: 49 U/L / AST: 35 U/L / GGT: x               PT/INR - ( 12 Oct 2020 06:35 )   PT: 13.0 sec;   INR: 1.13 ratio             Culture - Blood (collected 11 Oct 2020 08:18)  Source: .Blood Blood  Preliminary Report (13 Oct 2020 09:00):    No growth at 48 hours    Culture - Blood (collected 11 Oct 2020 08:18)  Source: .Blood Blood  Preliminary Report (13 Oct 2020 09:00):    No growth at 48 hours      IMAGING: I personally reviewed the MRCP, and I agree with the radiologist's interpretation as described below:  FINDINGS:  LOWER CHEST: Within normal limits.    LIVER: 4.4 cm thick-walled mass with peripheral enhancement and central high T2 signal in the posterior dependent portion of the posterior right subhepatic space. No intraparenchymal liver lesion.   Steatosis.  BILE DUCTS: Mild biliary ductal dilatation, with the common duct, measuring 1.3 cm. Multiple (at least 10) stones in the distal duct, the largest measuring 1.1 cm.  GALLBLADDER: Cholecystectomy.  SPLEEN: Within normal limits.  PANCREAS: 2 mm cyst in the pancreatic body (4; 23). No ductal dilatation.  ADRENALS: Within normal limits.  KIDNEYS/URETERS: Bilateral renal cysts.    VISUALIZED PORTIONS:  BOWEL: Within normal limits.  PERITONEUM: No ascites.  VESSELS: Within normal limits.  RETROPERITONEUM/LYMPH NODES: No lymphadenopathy.  ABDOMINAL WALL: Within normal limits.  BONES: No suspicious lesion.    IMPRESSION:  Choledocholithiasis and mild biliary ductal dilatation.    4.4 cm abscess in the posterior right subhepatic space. Given the history of cholecystectomy and location of the abscess, dropped gallstones are a consideration.   Chief Complaint: This is a 69y old man patient being seen in follow-up consultation for ERCP.    HPI / 24H events: Patient seeing and evaluated at bedside, reporting no complains, NPO for IR liver abscess drainage this am. S/P ERCP with sphincterotomy and stent placement. Denies nausea, vomiting, abdominal pain, chest pain, shortness of breath, hematemesis, hematochezia, melena. Pt tolerated yesterday's ERCP and sphincterotomy and attempted balloon stone extraction and biliary stent placement yesterday w/o post procedure abdominal pain or evidence of pancreatitis.      ROS: A 14-point review of systems was reviewed and was otherwise negative save what was reported in the HPI.    PAST MEDICAL/SURGICAL HISTORY:  History of 2019 novel coronavirus disease (COVID-19)    Hyperlipemia    HTN (hypertension)    Nephrolithiasis    OA (osteoarthritis)    CHEL on CPAP    History of laparoscopic cholecystectomy    History of ERCP    S/P arthroscopic knee surgery    S/P TKR (total knee replacement)  bilateral      MEDICATIONS  (STANDING):  allopurinol 100 milliGRAM(s) Oral daily  atorvastatin 10 milliGRAM(s) Oral at bedtime  cholecalciferol 1000 Unit(s) Oral daily  gabapentin 300 milliGRAM(s) Oral daily  indomethacin Suppository 100 milliGRAM(s) Rectal once  influenza   Vaccine 0.5 milliLiter(s) IntraMuscular once  lactated ringers. 1000 milliLiter(s) (150 mL/Hr) IV Continuous <Continuous>  metoprolol succinate ER 50 milliGRAM(s) Oral daily  multivitamin 1 Tablet(s) Oral daily  piperacillin/tazobactam IVPB.. 3.375 Gram(s) IV Intermittent every 8 hours  potassium citrate 10 milliEquivalent(s) Oral two times a day  saccharomyces boulardii 250 milliGRAM(s) Oral two times a day  ursodiol Capsule 300 milliGRAM(s) Oral three times a day    MEDICATIONS  (PRN):  acetaminophen   Tablet .. 650 milliGRAM(s) Oral every 6 hours PRN Temp greater or equal to 38C (100.4F), Mild Pain (1 - 3)  ondansetron Injectable 4 milliGRAM(s) IV Push every 6 hours PRN Nausea and/or Vomiting    ANESTHESIA (Unknown)  No Known Drug Allergies    T(C): 36.1 (10-13-20 @ 06:37), Max: 36.7 (10-12-20 @ 11:25)  HR: 70 (10-13-20 @ 06:37) (60 - 70)  BP: 126/77 (10-13-20 @ 06:37) (124/75 - 134/84)  RR: 16 (10-13-20 @ 06:37) (16 - 18)  SpO2: 97% (10-13-20 @ 06:37) (90% - 97%)    I&O's Summary    12 Oct 2020 07:01  -  13 Oct 2020 07:00  --------------------------------------------------------  IN: 725 mL / OUT: 1 mL / NET: 724 mL      PHYSICAL EXAM:  Constitutional: Morbidly obese, angry looking, afebrile, in no apparent distress  Eyes: Sclerae anicteric, conjunctivae normal  ENMT: Mucus membranes moist, no oropharyngeal thrush noted  Respiratory: Breathing nonlabored; clear to auscultation  Cardiovascular: Regular rate and rhythm  Gastrointestinal: Soft, nontender, nondistended, normoactive bowel sounds.  Extremities: No clubbing, cyanosis or edema  Neurological: Alert and oriented to person, place and time; no asterixis  Skin: No jaundice  Musculoskeletal: No significant peripheral atrophy  Psychiatric: Affect and mood appropriate                   13.7   14.23 )-----------( 293      ( 10-13 @ 07:58 )             41.7                13.7   10.58 )-----------( 277      ( 10-12 @ 06:35 )             41.1                14.1   10.73 )-----------( 279      ( 10-11 @ 08:18 )             42.2       10-13    138  |  100  |  17.0  ----------------------------<  127<H>  4.2   |  23.0  |  0.89    Ca    9.1      13 Oct 2020 07:58  Mg     2.1     10-12    TPro  7.1  /  Alb  3.6  /  TBili  1.1  /  DBili  x   /  AST  35  /  ALT  49<H>  /  AlkPhos  175<H>  10-13    LIVER FUNCTIONS - ( 13 Oct 2020 07:58 )  Alb: 3.6 g/dL / Pro: 7.1 g/dL / ALK PHOS: 175 U/L / ALT: 49 U/L / AST: 35 U/L / GGT: x               PT/INR - ( 12 Oct 2020 06:35 )   PT: 13.0 sec;   INR: 1.13 ratio             Culture - Blood (collected 11 Oct 2020 08:18)  Source: .Blood Blood  Preliminary Report (13 Oct 2020 09:00):    No growth at 48 hours    Culture - Blood (collected 11 Oct 2020 08:18)  Source: .Blood Blood  Preliminary Report (13 Oct 2020 09:00):    No growth at 48 hours      IMAGING: I personally reviewed the MRCP, and I agree with the radiologist's interpretation as described below:  FINDINGS:  LOWER CHEST: Within normal limits.    LIVER: 4.4 cm thick-walled mass with peripheral enhancement and central high T2 signal in the posterior dependent portion of the posterior right subhepatic space. No intraparenchymal liver lesion.   Steatosis.  BILE DUCTS: Mild biliary ductal dilatation, with the common duct, measuring 1.3 cm. Multiple (at least 10) stones in the distal duct, the largest measuring 1.1 cm.  GALLBLADDER: Cholecystectomy.  SPLEEN: Within normal limits.  PANCREAS: 2 mm cyst in the pancreatic body (4; 23). No ductal dilatation.  ADRENALS: Within normal limits.  KIDNEYS/URETERS: Bilateral renal cysts.    VISUALIZED PORTIONS:  BOWEL: Within normal limits.  PERITONEUM: No ascites.  VESSELS: Within normal limits.  RETROPERITONEUM/LYMPH NODES: No lymphadenopathy.  ABDOMINAL WALL: Within normal limits.  BONES: No suspicious lesion.    IMPRESSION:  Choledocholithiasis and mild biliary ductal dilatation.    4.4 cm abscess in the posterior right subhepatic space. Given the history of cholecystectomy and location of the abscess, dropped gallstones are a consideration.

## 2020-10-13 NOTE — PROGRESS NOTE ADULT - PROBLEM SELECTOR PLAN 1
MRCP revealed choledocholithiasis and mild biliary ductal dilatation.  Now S/P ERCP with sphincterotomy  Please repeat ERCP with biliary stent removal and possible spyglass stone disruption in six to eight weeks.

## 2020-10-13 NOTE — PROGRESS NOTE ADULT - ATTENDING COMMENTS
Patient was seen and examined at bedside around 9 am. No active complaints.  Abdominal exam benign.   Had ERCP yesterday, unable to remove 2 large distal CBD stones.   Had IR guided drainage of Liver Abscess today, follow cultures.  Continue current treatment.
I evaluated this pt. myself independently of NP Ayesha. No clinical evidence of post ERCP pancreatitis. No c/o abdominal pain this AM or overnight last night. He subsequently underwent uneventful IR drainage of liver abscess. Will need repeat ERCP with biliary stent removal and repeat attempt at CBD stone extraction in 6 to 8 weeks. He should be sent home on Ursodiol 300 mg. PO TID to attempt to soften these two large hard distal CBD stones. Pt. informed of all of this.

## 2020-10-13 NOTE — PROGRESS NOTE ADULT - SUBJECTIVE AND OBJECTIVE BOX
[facility]  INFECTIOUS DISEASES AND INTERNAL MEDICINE at [place]  =======================================================  Magdaleno Loja MD  Diplomates American Board of Internal Medicine and Infectious Diseases  Tel: [phone]      Fax: [phone]  =======================================================      [id number]  SHWETA VELAZCO   follow up: liver abscess    patient s/p ERCP, with lithotripsy on 12-Oct-2020. found with  2 large roughly 15 mm stones were seen in the distal CBD. CBD was dilated. A 12 mm. sphincterotomy was initially made and subsequently extended but despite extension and lithotripsy unable to remove these two large stones. A 10 Ivorian by 7 mm. plastic biliary stent was successfully deployed.      on 10/13/2020, he had drainage of  Rose hepatic collection by IR.   per Note, 10cc pus and 5 cc blood aspirated to near completion w remainder of collection appearing as thrombus. No drain placed.          Allergies  ANESTHESIA (Unknown)  No Known Drug Allergies               REVIEW OF SYSTEMS:  CONSTITUTIONAL:  No Fever or chills  HEENT:  No diplopia or blurred vision.  No earache, sore throat or runny nose.  CARDIOVASCULAR:  No pressure, squeezing, strangling, tightness, heaviness or aching about the chest, neck, axilla or epigastrium.  RESPIRATORY:  No cough, shortness of breath  GASTROINTESTINAL:  No nausea, vomiting or diarrhea.  MILD ABD discomfort  GENITOURINARY:  No dysuria, frequency or urgency. No Blood in urine  MUSCULOSKELETAL:  no joint aches, no muscle pain  SKIN:  No change in skin, hair or nails.  NEUROLOGIC:  No Headaches, seizures or weakness.  PSYCHIATRIC:  No disorder of thought or mood.  ENDOCRINE:  No heat or cold intolerance  HEMATOLOGICAL:  No easy bruising or bleeding.          Physical Exam:       GEN: NAD, pleasant, obese  HEENT: normocephalic and atraumatic. EOMI. PERRL.  Anicteric  NECK: Supple.   LUNGS: Clear to auscultation.  HEART: Regular rate and rhythm without murmur.  ABDOMEN: Soft, MILD RIGHT sided tenderness, and nondistended.  Positive bowel sounds.    : No CVA tenderness  EXTREMITIES: Without any edema. b/l TKR scars healed  MSK: No joint swelling  NEUROLOGIC: Cranial nerves II through XII are grossly intact. No Focal Deficits  PSYCHIATRIC: Appropriate affect .  SKIN: No Rash        Vitals:  ============  T(F): 96.9 (13 Oct 2020 06:37), Max: 98.1 (12 Oct 2020 16:57)  HR: 70 (13 Oct 2020 06:37)  BP: 126/77 (13 Oct 2020 06:37)  RR: 16 (13 Oct 2020 06:37)  SpO2: 97% (13 Oct 2020 06:37) (90% - 97%)  temp max in last 48H T(F): , Max: 98.1 (10-12-20 @ 16:57)    =======================================================  Current Antibiotics:  piperacillin/tazobactam IVPB.. 3.375 Gram(s) IV Intermittent every 8 hours    Other medications:  allopurinol 100 milliGRAM(s) Oral daily  atorvastatin 10 milliGRAM(s) Oral at bedtime  cholecalciferol 1000 Unit(s) Oral daily  gabapentin 300 milliGRAM(s) Oral daily  indomethacin Suppository 100 milliGRAM(s) Rectal once  influenza   Vaccine 0.5 milliLiter(s) IntraMuscular once  lactated ringers. 1000 milliLiter(s) IV Continuous <Continuous>  metoprolol succinate ER 50 milliGRAM(s) Oral daily  multivitamin 1 Tablet(s) Oral daily  potassium citrate 10 milliEquivalent(s) Oral two times a day  saccharomyces boulardii 250 milliGRAM(s) Oral two times a day  ursodiol Capsule 300 milliGRAM(s) Oral three times a day      =======================================================  Labs:                        13.7   14.23 )-----------( 293      ( 13 Oct 2020 07:58 )             41.7      10-13    138  |  100  |  17.0  ----------------------------<  127<H>  4.2   |  23.0  |  0.89    Ca    9.1      13 Oct 2020 07:58  Mg     2.1     10-12    TPro  7.1  /  Alb  3.6  /  TBili  1.1  /  DBili  x   /  AST  35  /  ALT  49<H>  /  AlkPhos  175<H>  10-13      Culture - Blood (collected 10-11-20 @ 08:18)  Source: .Blood Blood    Culture - Blood (collected 10-11-20 @ 08:18)  Source: .Blood Blood    Culture - Blood (collected 10-09-20 @ 15:37)  Source: .Blood Blood-Venous      Creatinine, Serum: 0.89 mg/dL (10-13-20 @ 07:58)  Creatinine, Serum: 0.77 mg/dL (10-12-20 @ 06:35)  Creatinine, Serum: 0.92 mg/dL (10-11-20 @ 08:18)  Creatinine, Serum: 0.96 mg/dL (10-10-20 @ 08:31)  Creatinine, Serum: 0.92 mg/dL (10-09-20 @ 15:37)    Procalcitonin, Serum: 0.08 ng/mL (10-11-20 @ 08:18)          WBC Count: 14.23 K/uL (10-13-20 @ 07:58)  WBC Count: 10.58 K/uL (10-12-20 @ 06:35)  WBC Count: 10.73 K/uL (10-11-20 @ 08:18)  WBC Count: 11.78 K/uL (10-10-20 @ 08:31)  WBC Count: 11.90 K/uL (10-09-20 @ 15:37)      COVID-19 PCR: NotDetec (10-09-20 @ 18:04)      Alkaline Phosphatase, Serum: 175 U/L (10-13-20 @ 07:58)  Alkaline Phosphatase, Serum: 96 U/L (10-12-20 @ 06:35)  Alkaline Phosphatase, Serum: 94 U/L (10-11-20 @ 08:18)  Alkaline Phosphatase, Serum: 100 U/L (10-10-20 @ 08:31)  Alkaline Phosphatase, Serum: 97 U/L (10-09-20 @ 15:37)     Alanine Aminotransferase (ALT/SGPT): 49 U/L (10-13-20 @ 07:58)  Alanine Aminotransferase (ALT/SGPT): 19 U/L (10-12-20 @ 06:35)  Alanine Aminotransferase (ALT/SGPT): 20 U/L (10-11-20 @ 08:18)  Alanine Aminotransferase (ALT/SGPT): 31 U/L (10-10-20 @ 08:31)  Alanine Aminotransferase (ALT/SGPT): 33 U/L (10-09-20 @ 15:37)    Aspartate Aminotransferase (AST/SGOT): 35 U/L (10-13-20 @ 07:58)  Aspartate Aminotransferase (AST/SGOT): 15 U/L (10-12-20 @ 06:35)  Aspartate Aminotransferase (AST/SGOT): 15 U/L (10-11-20 @ 08:18)  Aspartate Aminotransferase (AST/SGOT): 22 U/L (10-10-20 @ 08:31)  Aspartate Aminotransferase (AST/SGOT): 37 U/L (10-09-20 @ 15:37)  Bilirubin Total, Serum: 1.1 mg/dL (10-13-20 @ 07:58)  Bilirubin Total, Serum: 0.8 mg/dL (10-12-20 @ 06:35)  Bilirubin Total, Serum: 1.2 mg/dL (10-11-20 @ 08:18)  Bilirubin Total, Serum: 1.7 mg/dL (10-10-20 @ 08:31)  Bilirubin Total, Serum: 1.4 mg/dL (10-09-20 @ 15:37)  Bilirubin Direct, Serum: 0.2 mg/dL (10-12-20 @ 06:35)    Radiology:  < from: MR MRCP w/wo IV Cont (10.10.20 @ 10:37) >  IMPRESSION:  Choledocholithiasis and mild biliary ductal dilatation.    4.4 cm abscess in the posterior right subhepatic space. Given the history of cholecystectomy and location of the abscess, dropped gallstones are a consideration.        < end of copied text >

## 2020-10-13 NOTE — PROGRESS NOTE ADULT - PROBLEM SELECTOR PLAN 2
MRCP revealed a 4.4 cm abscess in the posterior right subhepatic space  Awaiting IR this am for liver abscess drainage.  Continue antibiotics as per ID

## 2020-10-13 NOTE — PROGRESS NOTE ADULT - NSHPATTENDINGPLANDISCUSS_GEN_ALL_CORE
patient and medical team
Patient, GI, IR and RN
Patient, Pulmonary and RN
Patient, NP, ID and RN
the pt. and NP Dadd.

## 2020-10-13 NOTE — PROGRESS NOTE ADULT - ASSESSMENT
69 years old male with PMH of Cholelithiasis / Choledocholithiasis s/p ERCP + Lap Cholecystectomy, Hepatitis B (in remission), Renal Calculi, HTN, HLD, Gout, Osteoarthritis, COVID-19 and Sleep Apnea on Nocturnal CPAP referred to [facility] yesterday by his PMD with liver lesion. As per patient, he was having RUQ pain for 2-3 weeks, went to PMD who ordered CT Abdomen which showed 3.2 x 4.4 x 3.3 cm necrotic subcapsular right hepatic lobe lesion with small amount of adjacent fluid. Had MRCP today which showed choledocholithiasis with mild biliary dilatation and 4.4 cm liver abscess in right subhepatic space. Seen by IR who recommended surgical evaluation. Seen by GI who discussed with Surgery and no surgical intervention was indicated. GI is recommending ERCP so he was transferred to [facility].    Liver abscess  Choledocholithiases  Leukocytosis      - continue Zosyn  - Blood cultures negative so far, will follow  - s/p ERCP in 10/12, with retained stones  - s/p IR drainage; with cx sent,    - anticipate discharge planning when culture available.     - follow up all outstanding cultures  - trend temperature and WBC curve  - repeat cultures from blood and all sources if febrile.

## 2020-10-14 ENCOUNTER — TRANSCRIPTION ENCOUNTER (OUTPATIENT)
Age: 70
End: 2020-10-14

## 2020-10-14 VITALS
OXYGEN SATURATION: 92 % | HEART RATE: 66 BPM | SYSTOLIC BLOOD PRESSURE: 125 MMHG | DIASTOLIC BLOOD PRESSURE: 76 MMHG | TEMPERATURE: 98 F | RESPIRATION RATE: 20 BRPM

## 2020-10-14 LAB
ALBUMIN SERPL ELPH-MCNC: 3.5 G/DL — SIGNIFICANT CHANGE UP (ref 3.3–5.2)
ALP SERPL-CCNC: 137 U/L — HIGH (ref 40–120)
ALT FLD-CCNC: 31 U/L — SIGNIFICANT CHANGE UP
ANION GAP SERPL CALC-SCNC: 11 MMOL/L — SIGNIFICANT CHANGE UP (ref 5–17)
AST SERPL-CCNC: 19 U/L — SIGNIFICANT CHANGE UP
BASOPHILS # BLD AUTO: 0.03 K/UL — SIGNIFICANT CHANGE UP (ref 0–0.2)
BASOPHILS NFR BLD AUTO: 0.3 % — SIGNIFICANT CHANGE UP (ref 0–2)
BILIRUB SERPL-MCNC: 1 MG/DL — SIGNIFICANT CHANGE UP (ref 0.4–2)
BUN SERPL-MCNC: 21 MG/DL — HIGH (ref 8–20)
CALCIUM SERPL-MCNC: 8.6 MG/DL — SIGNIFICANT CHANGE UP (ref 8.6–10.2)
CHLORIDE SERPL-SCNC: 103 MMOL/L — SIGNIFICANT CHANGE UP (ref 98–107)
CO2 SERPL-SCNC: 24 MMOL/L — SIGNIFICANT CHANGE UP (ref 22–29)
CREAT SERPL-MCNC: 0.85 MG/DL — SIGNIFICANT CHANGE UP (ref 0.5–1.3)
CULTURE RESULTS: SIGNIFICANT CHANGE UP
EOSINOPHIL # BLD AUTO: 0.16 K/UL — SIGNIFICANT CHANGE UP (ref 0–0.5)
EOSINOPHIL NFR BLD AUTO: 1.5 % — SIGNIFICANT CHANGE UP (ref 0–6)
GLUCOSE SERPL-MCNC: 115 MG/DL — HIGH (ref 70–99)
HCT VFR BLD CALC: 39.1 % — SIGNIFICANT CHANGE UP (ref 39–50)
HGB BLD-MCNC: 12.9 G/DL — LOW (ref 13–17)
IMM GRANULOCYTES NFR BLD AUTO: 0.6 % — SIGNIFICANT CHANGE UP (ref 0–1.5)
LYMPHOCYTES # BLD AUTO: 1.61 K/UL — SIGNIFICANT CHANGE UP (ref 1–3.3)
LYMPHOCYTES # BLD AUTO: 15.4 % — SIGNIFICANT CHANGE UP (ref 13–44)
MAGNESIUM SERPL-MCNC: 2.1 MG/DL — SIGNIFICANT CHANGE UP (ref 1.6–2.6)
MCHC RBC-ENTMCNC: 30.4 PG — SIGNIFICANT CHANGE UP (ref 27–34)
MCHC RBC-ENTMCNC: 33 GM/DL — SIGNIFICANT CHANGE UP (ref 32–36)
MCV RBC AUTO: 92 FL — SIGNIFICANT CHANGE UP (ref 80–100)
MONOCYTES # BLD AUTO: 0.89 K/UL — SIGNIFICANT CHANGE UP (ref 0–0.9)
MONOCYTES NFR BLD AUTO: 8.5 % — SIGNIFICANT CHANGE UP (ref 2–14)
NEUTROPHILS # BLD AUTO: 7.7 K/UL — HIGH (ref 1.8–7.4)
NEUTROPHILS NFR BLD AUTO: 73.7 % — SIGNIFICANT CHANGE UP (ref 43–77)
PLATELET # BLD AUTO: 260 K/UL — SIGNIFICANT CHANGE UP (ref 150–400)
POTASSIUM SERPL-MCNC: 4.3 MMOL/L — SIGNIFICANT CHANGE UP (ref 3.5–5.3)
POTASSIUM SERPL-SCNC: 4.3 MMOL/L — SIGNIFICANT CHANGE UP (ref 3.5–5.3)
PROT SERPL-MCNC: 6.4 G/DL — LOW (ref 6.6–8.7)
RBC # BLD: 4.25 M/UL — SIGNIFICANT CHANGE UP (ref 4.2–5.8)
RBC # FLD: 13.5 % — SIGNIFICANT CHANGE UP (ref 10.3–14.5)
SODIUM SERPL-SCNC: 137 MMOL/L — SIGNIFICANT CHANGE UP (ref 135–145)
SPECIMEN SOURCE: SIGNIFICANT CHANGE UP
WBC # BLD: 10.45 K/UL — SIGNIFICANT CHANGE UP (ref 3.8–10.5)
WBC # FLD AUTO: 10.45 K/UL — SIGNIFICANT CHANGE UP (ref 3.8–10.5)

## 2020-10-14 PROCEDURE — 85025 COMPLETE CBC W/AUTO DIFF WBC: CPT

## 2020-10-14 PROCEDURE — 99232 SBSQ HOSP IP/OBS MODERATE 35: CPT

## 2020-10-14 PROCEDURE — 36569 INSJ PICC 5 YR+ W/O IMAGING: CPT

## 2020-10-14 PROCEDURE — 83735 ASSAY OF MAGNESIUM: CPT

## 2020-10-14 PROCEDURE — 80076 HEPATIC FUNCTION PANEL: CPT

## 2020-10-14 PROCEDURE — 71046 X-RAY EXAM CHEST 2 VIEWS: CPT

## 2020-10-14 PROCEDURE — 87070 CULTURE OTHR SPECIMN AEROBIC: CPT

## 2020-10-14 PROCEDURE — C2625: CPT

## 2020-10-14 PROCEDURE — 99239 HOSP IP/OBS DSCHRG MGMT >30: CPT | Mod: GC

## 2020-10-14 PROCEDURE — 80053 COMPREHEN METABOLIC PANEL: CPT

## 2020-10-14 PROCEDURE — 36415 COLL VENOUS BLD VENIPUNCTURE: CPT

## 2020-10-14 PROCEDURE — 71045 X-RAY EXAM CHEST 1 VIEW: CPT

## 2020-10-14 PROCEDURE — 90686 IIV4 VACC NO PRSV 0.5 ML IM: CPT

## 2020-10-14 PROCEDURE — 84145 PROCALCITONIN (PCT): CPT

## 2020-10-14 PROCEDURE — 76942 ECHO GUIDE FOR BIOPSY: CPT | Mod: 26,59

## 2020-10-14 PROCEDURE — 87205 SMEAR GRAM STAIN: CPT

## 2020-10-14 PROCEDURE — C1773: CPT

## 2020-10-14 PROCEDURE — 74330 X-RAY BILE/PANC ENDOSCOPY: CPT

## 2020-10-14 PROCEDURE — 83036 HEMOGLOBIN GLYCOSYLATED A1C: CPT

## 2020-10-14 PROCEDURE — 85610 PROTHROMBIN TIME: CPT

## 2020-10-14 PROCEDURE — C1769: CPT

## 2020-10-14 PROCEDURE — 76942 ECHO GUIDE FOR BIOPSY: CPT

## 2020-10-14 PROCEDURE — 99233 SBSQ HOSP IP/OBS HIGH 50: CPT

## 2020-10-14 PROCEDURE — C1729: CPT

## 2020-10-14 PROCEDURE — 87040 BLOOD CULTURE FOR BACTERIA: CPT

## 2020-10-14 PROCEDURE — 80048 BASIC METABOLIC PNL TOTAL CA: CPT

## 2020-10-14 PROCEDURE — 77002 NEEDLE LOCALIZATION BY XRAY: CPT

## 2020-10-14 PROCEDURE — 71045 X-RAY EXAM CHEST 1 VIEW: CPT | Mod: 26

## 2020-10-14 PROCEDURE — 86769 SARS-COV-2 COVID-19 ANTIBODY: CPT

## 2020-10-14 RX ORDER — URSODIOL 250 MG/1
1 TABLET, FILM COATED ORAL
Qty: 90 | Refills: 0
Start: 2020-10-14 | End: 2020-11-12

## 2020-10-14 RX ORDER — CHLORHEXIDINE GLUCONATE 213 G/1000ML
1 SOLUTION TOPICAL
Refills: 0 | Status: DISCONTINUED | OUTPATIENT
Start: 2020-10-14 | End: 2020-10-14

## 2020-10-14 RX ORDER — INFLUENZA VIRUS VACCINE 15; 15; 15; 15 UG/.5ML; UG/.5ML; UG/.5ML; UG/.5ML
0.5 SUSPENSION INTRAMUSCULAR ONCE
Refills: 0 | Status: COMPLETED | OUTPATIENT
Start: 2020-10-14 | End: 2020-10-14

## 2020-10-14 RX ORDER — SODIUM CHLORIDE 9 MG/ML
10 INJECTION INTRAMUSCULAR; INTRAVENOUS; SUBCUTANEOUS
Refills: 0 | Status: DISCONTINUED | OUTPATIENT
Start: 2020-10-14 | End: 2020-10-14

## 2020-10-14 RX ORDER — ERTAPENEM SODIUM 1 G/1
1000 INJECTION, POWDER, LYOPHILIZED, FOR SOLUTION INTRAMUSCULAR; INTRAVENOUS EVERY 24 HOURS
Refills: 0 | Status: DISCONTINUED | OUTPATIENT
Start: 2020-10-14 | End: 2020-10-14

## 2020-10-14 RX ORDER — ERTAPENEM SODIUM 1 G/1
1 INJECTION, POWDER, LYOPHILIZED, FOR SOLUTION INTRAMUSCULAR; INTRAVENOUS
Qty: 0 | Refills: 0 | DISCHARGE
Start: 2020-10-14

## 2020-10-14 RX ADMIN — URSODIOL 300 MILLIGRAM(S): 250 TABLET, FILM COATED ORAL at 13:20

## 2020-10-14 RX ADMIN — INFLUENZA VIRUS VACCINE 0.5 MILLILITER(S): 15; 15; 15; 15 SUSPENSION INTRAMUSCULAR at 15:22

## 2020-10-14 RX ADMIN — Medication 250 MILLIGRAM(S): at 05:47

## 2020-10-14 RX ADMIN — GABAPENTIN 300 MILLIGRAM(S): 400 CAPSULE ORAL at 11:51

## 2020-10-14 RX ADMIN — ERTAPENEM SODIUM 120 MILLIGRAM(S): 1 INJECTION, POWDER, LYOPHILIZED, FOR SOLUTION INTRAMUSCULAR; INTRAVENOUS at 12:15

## 2020-10-14 RX ADMIN — CHLORHEXIDINE GLUCONATE 1 APPLICATION(S): 213 SOLUTION TOPICAL at 15:25

## 2020-10-14 RX ADMIN — URSODIOL 300 MILLIGRAM(S): 250 TABLET, FILM COATED ORAL at 05:47

## 2020-10-14 RX ADMIN — Medication 600 MILLIGRAM(S): at 16:40

## 2020-10-14 RX ADMIN — Medication 10 MILLIEQUIVALENT(S): at 05:47

## 2020-10-14 RX ADMIN — Medication 100 MILLIGRAM(S): at 11:51

## 2020-10-14 RX ADMIN — Medication 650 MILLIGRAM(S): at 07:39

## 2020-10-14 RX ADMIN — PIPERACILLIN AND TAZOBACTAM 25 GRAM(S): 4; .5 INJECTION, POWDER, LYOPHILIZED, FOR SOLUTION INTRAVENOUS at 05:47

## 2020-10-14 RX ADMIN — Medication 1 TABLET(S): at 11:51

## 2020-10-14 RX ADMIN — Medication 10 MILLIEQUIVALENT(S): at 16:40

## 2020-10-14 RX ADMIN — Medication 25 MILLIGRAM(S): at 05:47

## 2020-10-14 RX ADMIN — Medication 50 MILLIGRAM(S): at 05:47

## 2020-10-14 RX ADMIN — Medication 1000 UNIT(S): at 11:51

## 2020-10-14 NOTE — DISCHARGE NOTE PROVIDER - NSDCCPCAREPLAN_GEN_ALL_CORE_FT
PRINCIPAL DISCHARGE DIAGNOSIS  Diagnosis: Choledocholithiasis  Assessment and Plan of Treatment: Continue current medications as prescribed.  Follow up with primary doctor and GI.  Will need repeat ERCP in 6 weeks.      SECONDARY DISCHARGE DIAGNOSES  Diagnosis: Liver abscess  Assessment and Plan of Treatment: Complete antibiotic course.   Follow up with primary doctor and ID.

## 2020-10-14 NOTE — PROGRESS NOTE ADULT - PROBLEM SELECTOR PLAN 1
Patient is S/P ERCP  with stent placement   ERCP with spyglass in 6- 8 weeks with Dr Puckett  F/U in office in 2-3 weeks  Ursodiol 300 mg tid

## 2020-10-14 NOTE — DISCHARGE NOTE PROVIDER - CARE PROVIDERS DIRECT ADDRESSES
,DirectAddress_Unknown,DirectAddress_Unknown,[email or web address] ,DirectAddress_Unknown,DirectAddress_Unknown,[email or web address]

## 2020-10-14 NOTE — PROCEDURE NOTE - NSPOSTPRCRAD_GEN_A_CORE
postion of catheter/chest radiograph/depth of insertion/line adjusted to depth of insertion/line in appropriate postion/ultrasound

## 2020-10-14 NOTE — PROGRESS NOTE ADULT - SUBJECTIVE AND OBJECTIVE BOX
Patient is a 69y old  Male who presents with a chief complaint of Transferred from [facility] for ERCP (13 Oct 2020 14:34)      HPI:  69 years old male with PMH of Cholelithiasis / Choledocholithiasis s/p ERCP + Lap Cholecystectomy, Hepatitis B (in remission), Renal Calculi, HTN, HLD, Gout, Osteoarthritis, COVID-19 and Sleep Apnea on Nocturnal CPAP . + liver abscess - 10 cc drained by IR yesterday.  Had ERCP with sphincterotomy and stent placement. No abdominal pain. NO fevers. Tolerating solid diet       (10 Oct 2020 18:21)      REVIEW OF SYSTEMS:  Constitutional: No fever, weight loss or fatigue  ENMT:  No difficulty hearing, tinnitus, vertigo; No sinus or throat pain  Respiratory: No cough, wheezing, chills or hemoptysis  Cardiovascular: No chest pain, palpitations, dizziness or leg swelling  Gastrointestinal: No abdominal or epigastric pain. No nausea, vomiting or hematemesis; No diarrhea or constipation. No melena or hematochezia.  Skin: No itching, burning, rashes or lesions   Musculoskeletal: No joint pain or swelling; No muscle, back or extremity pain    PAST MEDICAL & SURGICAL HISTORY:  History of 2019 novel coronavirus disease (COVID-19)    Hyperlipemia    HTN (hypertension)    Nephrolithiasis    OA (osteoarthritis)    CHEL on CPAP    History of laparoscopic cholecystectomy    History of ERCP    S/P arthroscopic knee surgery    S/P TKR (total knee replacement)  bilateral        FAMILY HISTORY:  Family history of gallstones  mother   she  at age last 80&#x27;s    FH: heart disease  dad  at age high 70&#x27;s. First MI at age 49        SOCIAL HISTORY:  Smoking Status: [ ] Current, [ ] Former, [ ] Never  Pack Years:  [  ] EtOH-no  [  ] IVDA    MEDICATIONS:  MEDICATIONS  (STANDING):  allopurinol 100 milliGRAM(s) Oral daily  atorvastatin 10 milliGRAM(s) Oral at bedtime  cholecalciferol 1000 Unit(s) Oral daily  ertapenem  IVPB 1000 milliGRAM(s) IV Intermittent every 24 hours  gabapentin 300 milliGRAM(s) Oral daily  guaiFENesin  milliGRAM(s) Oral every 12 hours  hydrochlorothiazide 25 milliGRAM(s) Oral daily  indomethacin Suppository 100 milliGRAM(s) Rectal once  metoprolol succinate ER 50 milliGRAM(s) Oral daily  multivitamin 1 Tablet(s) Oral daily  potassium citrate 10 milliEquivalent(s) Oral two times a day  ursodiol Capsule 300 milliGRAM(s) Oral three times a day    MEDICATIONS  (PRN):  acetaminophen   Tablet .. 650 milliGRAM(s) Oral every 6 hours PRN Temp greater or equal to 38C (100.4F), Mild Pain (1 - 3)  ondansetron Injectable 4 milliGRAM(s) IV Push every 6 hours PRN Nausea and/or Vomiting      Allergies    ANESTHESIA (Unknown)  No Known Drug Allergies    Intolerances        Vital Signs Last 24 Hrs  T(C): 36.6 (14 Oct 2020 10:30), Max: 37.2 (13 Oct 2020 21:32)  T(F): 97.9 (14 Oct 2020 10:30), Max: 98.9 (13 Oct 2020 21:32)  HR: 62 (14 Oct 2020 10:30) (62 - 82)  BP: 109/71 (14 Oct 2020 10:30) (102/61 - 111/70)  BP(mean): --  RR: 20 (14 Oct 2020 10:30) (18 - 20)  SpO2: 94% (14 Oct 2020 10:30) (94% - 99%)        PHYSICAL EXAM:    General: Well developed; well nourished; in no acute distress  HEENT: MMM, conjunctiva and sclera clear  H- RRR  L- CTA  Gastrointestinal: Soft, non-tender non-distended; Normal bowel sounds; No rebound or guarding  Extremities: Normal range of motion, No clubbing, cyanosis or edema  Neurological: Alert and oriented x3  Skin: Warm and dry. No obvious rash      LABS:                        12.9   10.45 )-----------( 260      ( 14 Oct 2020 07:04 )             39.1     14 Oct 2020 07:04    137    |  103    |  21.0   ----------------------------<  115    4.3     |  24.0   |  0.85     Ca    8.6        14 Oct 2020 07:04  Mg     2.1       14 Oct 2020 07:04    TPro  6.4    /  Alb  3.5    /  TBili  1.0    /  DBili  x      /  AST  19     /  ALT  31     /  AlkPhos  137    / Amylase x      /Lipase x      14 Oct 2020 07:04              RADIOLOGY & ADDITIONAL STUDIES:     < from: CT Abdomen and Pelvis No Cont (09.15.19 @ 06:13) >  MPRESSION:      No significant interval change since prior CT of 10/18/2018. No   hydronephrosis or urinary tract calculus. Bilateral perinephric   stranding, nonspecific.    Cholelithiasis.    < end of copied text >

## 2020-10-14 NOTE — DISCHARGE NOTE PROVIDER - CARE PROVIDER_API CALL
Flakito Alejandra  FAMILY MEDICINE  [address] [place] [address]  Phone: [phone]  Fax: [phone]  Follow Up Time:     Janki Loja  INTERNAL MEDICINE  [address] [place] [address]  Phone: [phone]  Fax: [phone]  Follow Up Time:     EDWARD Alas)  Internal Medicine  [address] [place] [address]  Phone: [phone]  Fax: [phone]  Follow Up Time:    Flakito Alejandra  FAMILY MEDICINE  [address] [place] [address]  Phone: [phone]  Fax: [phone]  Follow Up Time:     Janki Loja  INTERNAL MEDICINE  [address] [place] [address]  Phone: [phone]  Fax: [phone]  Follow Up Time:     Stone Puckett  GASTROENTEROLOGY  [address] [facility], [address] [place] [address]  Phone: [phone]  Fax: [phone]  Follow Up Time:

## 2020-10-14 NOTE — PROGRESS NOTE ADULT - REASON FOR ADMISSION
Transferred from [facility] for ERCP
Transferred from [facility] for ERCP
Transferred from [facility] for ERCP
Transferred from [facility] for ERCP
Transferred from [facility] for ERCP
Transferred from [facility] for ERCP
Transferred from [facility] for ERCP

## 2020-10-14 NOTE — PROGRESS NOTE ADULT - PROBLEM SELECTOR PLAN 2
blood cultures negative  awaiting Pic line for home antibiotics. ID following  may D/C home after pic line if ok with ID

## 2020-10-14 NOTE — DISCHARGE NOTE PROVIDER - NSDCFUADDAPPT_GEN_ALL_CORE_FT
Follow up with primary doctor, GI and ID. Follow up with PMD in 1 week.  Follow up with ID in 1 week.  Follow up with GI in 2-3 weeks.

## 2020-10-14 NOTE — PROCEDURE NOTE - NSPOSTCAREGUIDE_GEN_A_CORE
Instructed patient/caregiver to follow-up with primary care physician/Instructed patient/caregiver regarding signs and symptoms of infection/Keep the cast/splint/dressing clean and dry/Verbal/written post procedure instructions were given to patient/caregiver/Care for catheter as per unit/ICU protocols

## 2020-10-14 NOTE — DISCHARGE NOTE PROVIDER - PROVIDER TOKENS
PROVIDER:[TOKEN:[[id number]]],PROVIDER:[TOKEN:[[id number]]],PROVIDER:[TOKEN:[[id number]]] PROVIDER:[TOKEN:[[id number]]],PROVIDER:[TOKEN:[[id number]]],PROVIDER:[TOKEN:[[id number]]]

## 2020-10-14 NOTE — DISCHARGE NOTE PROVIDER - NSDCMRMEDTOKEN_GEN_ALL_CORE_FT
acetaminophen 325 mg oral tablet: 2 tab(s) orally every 6 hours, As needed, Temp greater or equal to 38C (100.4F), Mild Pain (1 - 3), Moderate Pain (4 - 6)  allopurinol 100 mg oral tablet: 1 tab(s) orally once a day  cholecalciferol oral tablet: 1000 unit(s) orally once a day  Co Q-10 100 mg oral capsule: 1 cap(s) orally once a day  ertapenem 1 g injection: 1 gram(s) injectable once a day x4 weeks  gabapentin 300 mg oral capsule: 1 cap(s) orally once a day  guaiFENesin 600 mg oral tablet, extended release: 1 tab(s) orally every 12 hours  hydroCHLOROthiazide 25 mg oral tablet: 1 tab(s) orally once a day  metoprolol succinate 50 mg oral tablet, extended release: 1 tab(s) orally once a day  Multiple Vitamins oral tablet: 1 tab(s) orally once a day  potassium citrate 10 mEq oral tablet, extended release: 1 tab(s) orally 2 times a day  pravastatin 40 mg oral tablet: 1 tab(s) orally once a day  ursodiol 300 mg oral capsule: 1 cap(s) orally 3 times a day   acetaminophen 325 mg oral tablet: 2 tab(s) orally every 6 hours, As needed, Temp greater or equal to 38C (100.4F), Mild Pain (1 - 3), Moderate Pain (4 - 6)  allopurinol 100 mg oral tablet: 1 tab(s) orally once a day  cholecalciferol oral tablet: 1000 unit(s) orally once a day  Co Q-10 100 mg oral capsule: 1 cap(s) orally once a day  ertapenem 1 g injection: 1 gram(s) injectable once a day till 11/10/20.  gabapentin 300 mg oral capsule: 1 cap(s) orally once a day  guaiFENesin 600 mg oral tablet, extended release: 1 tab(s) orally every 12 hours  hydroCHLOROthiazide 25 mg oral tablet: 1 tab(s) orally once a day  metoprolol succinate 50 mg oral tablet, extended release: 1 tab(s) orally once a day  Multiple Vitamins oral tablet: 1 tab(s) orally once a day  potassium citrate 10 mEq oral tablet, extended release: 1 tab(s) orally 2 times a day  pravastatin 40 mg oral tablet: 1 tab(s) orally once a day  ursodiol 300 mg oral capsule: 1 cap(s) orally 3 times a day

## 2020-10-14 NOTE — PROGRESS NOTE ADULT - ASSESSMENT
69 years old male with PMH of Cholelithiasis / Choledocholithiasis s/p ERCP + Lap Cholecystectomy, Hepatitis B (in remission), Renal Calculi, HTN, HLD, Gout, Osteoarthritis, COVID-19 and Sleep Apnea on Nocturnal CPAP referred to [facility] yesterday by his PMD with liver lesion. As per patient, he was having RUQ pain for 2-3 weeks, went to PMD who ordered CT Abdomen which showed 3.2 x 4.4 x 3.3 cm necrotic subcapsular right hepatic lobe lesion with small amount of adjacent fluid. Had MRCP today which showed choledocholithiasis with mild biliary dilatation and 4.4 cm liver abscess in right subhepatic space. Seen by IR who recommended surgical evaluation. Seen by GI who discussed with Surgery and no surgical intervention was indicated. GI is recommending ERCP so he was transferred to [facility].    Liver abscess  Choledocholithiases  Leukocytosis    - afebrile, WBC wnl  - Blood cultures negative   - s/p ERCP in 10/12, with retained stones. Needs repeat ERCP in 6-8 weeks for stent removal and possible spyglass stone disruption  - s/p IR drainage; with cx sent  - patient not willing to stay for IR CX  - given presence of liver abscess suggest prolonged IV abx  - suggest PICC line and Ertapenem 1 g IV daily through 11/10/20 with weekly CBC CMP  - will need to f/u Id in 1-2 weeks to f/u IR cx and modify regimen as necessary  - macrina zosyn  - outpatient GI f/u  - Script called in to [facility]    d/w attending and CM 69 years old male with PMH of Cholelithiasis / Choledocholithiasis s/p ERCP + Lap Cholecystectomy, Hepatitis B (in remission), Renal Calculi, HTN, HLD, Gout, Osteoarthritis, COVID-19 and Sleep Apnea on Nocturnal CPAP referred to [facility] yesterday by his PMD with liver lesion. As per patient, he was having RUQ pain for 2-3 weeks, went to PMD who ordered CT Abdomen which showed 3.2 x 4.4 x 3.3 cm necrotic subcapsular right hepatic lobe lesion with small amount of adjacent fluid. Had MRCP today which showed choledocholithiasis with mild biliary dilatation and 4.4 cm liver abscess in right subhepatic space. Seen by IR who recommended surgical evaluation. Seen by GI who discussed with Surgery and no surgical intervention was indicated. GI is recommending ERCP so he was transferred to [facility].    Liver abscess  Choledocholithiases  Leukocytosis    - afebrile, WBC wnl  - Blood cultures negative   - s/p ERCP in 10/12, with retained stones. Needs repeat ERCP in 6-8 weeks for stent removal and possible spyglass stone disruption  - s/p IR drainage of liver abscess on 10/13; with cx sent  - patient not willing to stay for IR CX  - given presence of liver abscess suggest prolonged IV abx  - suggest PICC line and Ertapenem 1 g IV daily through 11/10/20 with weekly CBC CMP  - will need to f/u Id in 1-2 weeks to f/u IR cx and modify regimen as necessary  - macrina zosyn  - outpatient GI f/u  - Script called in to [facility]    d/w attending and CM

## 2020-10-14 NOTE — DISCHARGE NOTE NURSING/CASE MANAGEMENT/SOCIAL WORK - PATIENT PORTAL LINK FT
You can access the FollowMyHealth Patient Portal offered by [facility] by registering at the following website: [email or web address]. By joining [employer]’s FollowMyHealth portal, you will also be able to view your health information using other applications (apps) compatible with our system.

## 2020-10-14 NOTE — DISCHARGE NOTE PROVIDER - HOSPITAL COURSE
69 years old male with PMH of Cholelithiasis / Choledocholithiasis s/p ERCP + Lap Cholecystectomy, Hepatitis B (in remission), Renal Calculi, HTN, HLD, Gout, Osteoarthritis, COVID-19 and Sleep Apnea on Nocturnal CPAP referred to [facility] by his PMD with liver lesion. As per patient, he was having RUQ pain for 2-3 weeks, went to PMD who ordered CT Abdomen which showed 3.2 x 4.4 x 3.3 cm necrotic subcapsular right hepatic lobe lesion with small amount of adjacent fluid. Had MRCP which showed choledocholithiasis with mild biliary dilatation and 4.4 cm liver abscess in right subhepatic space. Seen by IR who recommended surgical evaluation. Seen by GI who discussed with Surgery and no surgical intervention was indicated. GI is recommending ERCP so he was transferred to [facility].  Patient evaluated by GI.  Patient s/p ERCP with CBD stones, unable to remove, stent placed.  Patient treated with Zosyn.  Patient underwent liver abscess drainage in IR.  Culture obtained.  Blood cultures negative.  ID following.  PICC placed and patient to receive IV antibiotics for 4 weeks.  Patient stable for discharge to home with home care and IV antibiotics arranged.        Vital Signs Last 24 Hrs  T(C): 36.6 (14 Oct 2020 10:30), Max: 37.2 (13 Oct 2020 21:32)  T(F): 97.9 (14 Oct 2020 10:30), Max: 98.9 (13 Oct 2020 21:32)  HR: 62 (14 Oct 2020 10:30) (62 - 82)  BP: 109/71 (14 Oct 2020 10:30) (102/61 - 111/70)  BP(mean): --  RR: 20 (14 Oct 2020 10:30) (18 - 20)  SpO2: 94% (14 Oct 2020 10:30) (94% - 99%)    PHYSICAL EXAM:  GENERAL: NAD  HEAD:  Atraumatic, Normocephalic  NECK: Supple, No JVD, Normal thyroid  NERVOUS SYSTEM:  Alert & Oriented X3, Good concentration; Motor Strength 5/5 B/L upper and lower extremities  CHEST/LUNG: Clear to auscultation bilaterally; No rales, rhonchi, wheezing, or rubs  HEART: Regular rate and rhythm; No murmurs, rubs, or gallops  ABDOMEN: Soft, Nontender, Nondistended; Bowel sounds present  EXTREMITIES:  2+ Peripheral Pulses, No clubbing, cyanosis, or edema     69 years old male with PMH of Cholelithiasis / Choledocholithiasis s/p ERCP + Lap Cholecystectomy, Hepatitis B (in remission), Renal Calculi, HTN, HLD, Gout, Osteoarthritis, COVID-19 and Sleep Apnea on Nocturnal CPAP referred to [facility] by his PMD with liver lesion. As per patient, he was having RUQ pain for 2-3 weeks, went to PMD who ordered CT Abdomen which showed 3.2 x 4.4 x 3.3 cm necrotic subcapsular right hepatic lobe lesion with small amount of adjacent fluid. Had MRCP which showed choledocholithiasis with mild biliary dilatation and 4.4 cm liver abscess in right subhepatic space. Seen by IR who recommended surgical evaluation. Seen by GI who discussed with Surgery and no surgical intervention was indicated. GI is recommending ERCP so he was transferred to [facility].  Patient evaluated by GI.  Patient s/p ERCP, unable to remove distal CBD stones, stent placed. Patient continued on Zosyn. Patient underwent liver abscess drainage by IR. Culture obtained (currently in progress). Blood cultures negative. ID following. PICC placed and patient to receive IV antibiotics for 4 weeks.  Patient stable for discharge to home with home care and IV antibiotics arranged.      PHYSICAL EXAM  Vital Signs   T(C): 36.6 (14 Oct 2020 10:30), Max: 37.2 (13 Oct 2020 21:32)  T(F): 97.9 (14 Oct 2020 10:30), Max: 98.9 (13 Oct 2020 21:32)  HR: 62 (14 Oct 2020 10:30) (62 - 82)  BP: 109/71 (14 Oct 2020 10:30) (102/61 - 111/70)  RR: 20 (14 Oct 2020 10:30) (18 - 20)  SpO2: 94% (14 Oct 2020 10:30) (94% - 99%)  General: Well developed. Well nourished. No acute distress  HEENT: PERRLA. EOMI. Clear conjunctivae. Moist mucus membrane  Neck: Supple.   Chest: CTA bilaterally - no wheezing, rales or rhonchi.   Heart: Normal S1 & S2 with RRR.   Abdomen: Obese. Soft. Non-tender. + BS. Dressing on lateral side of RUQ.   Ext: No pedal edema. No calf tenderness   Neuro: AAO x 3. No focal deficit. No speech disorder.  Skin: Warm and Dry  Psychiatry: Normal mood and affect     Time spent: 40 minutes        69 years old male with PMH of Cholelithiasis / Choledocholithiasis s/p ERCP + Lap Cholecystectomy, Hepatitis B (in remission), Renal Calculi, HTN, HLD, Gout, Osteoarthritis, COVID-19 and Sleep Apnea on Nocturnal CPAP referred to [facility] by his PMD with liver lesion. As per patient, he was having RUQ pain for 2-3 weeks, went to PMD who ordered CT Abdomen which showed 3.2 x 4.4 x 3.3 cm necrotic subcapsular right hepatic lobe lesion with small amount of adjacent fluid. Had MRCP which showed choledocholithiasis with mild biliary dilatation and 4.4 cm liver abscess in right subhepatic space. Seen by IR who recommended surgical evaluation. Seen by GI who discussed with Surgery and no surgical intervention was indicated. GI is recommending ERCP so he was transferred to [facility].  Patient evaluated by GI.  Patient s/p ERCP, unable to remove distal CBD stones, stent placed. Patient continued on Zosyn. Patient underwent liver abscess drainage by IR. Culture obtained (currently in progress). Patient does not want to wait for liver abscess culture. Blood cultures negative. ID recommending antibiotics till 11/10/20. PICC placed. Patient stable for discharge to home with home care, IV antibiotics arranged.      PHYSICAL EXAM  Vital Signs   T(C): 36.6 (14 Oct 2020 10:30), Max: 37.2 (13 Oct 2020 21:32)  T(F): 97.9 (14 Oct 2020 10:30), Max: 98.9 (13 Oct 2020 21:32)  HR: 62 (14 Oct 2020 10:30) (62 - 82)  BP: 109/71 (14 Oct 2020 10:30) (102/61 - 111/70)  RR: 20 (14 Oct 2020 10:30) (18 - 20)  SpO2: 94% (14 Oct 2020 10:30) (94% - 99%)  General: Well developed. Well nourished. No acute distress  HEENT: PERRLA. EOMI. Clear conjunctivae. Moist mucus membrane  Neck: Supple.   Chest: CTA bilaterally - no wheezing, rales or rhonchi.   Heart: Normal S1 & S2 with RRR.   Abdomen: Obese. Soft. Non-tender. + BS. Dressing on lateral side of RUQ.   Ext: No pedal edema. No calf tenderness   Neuro: AAO x 3. No focal deficit. No speech disorder.  Skin: Warm and Dry  Psychiatry: Normal mood and affect     Time spent: 40 minutes

## 2020-10-16 PROBLEM — Z86.19 PERSONAL HISTORY OF OTHER INFECTIOUS AND PARASITIC DISEASES: Chronic | Status: ACTIVE | Noted: 2020-10-09

## 2020-10-18 LAB
CULTURE RESULTS: SIGNIFICANT CHANGE UP
SPECIMEN SOURCE: SIGNIFICANT CHANGE UP

## 2020-10-26 ENCOUNTER — APPOINTMENT (OUTPATIENT)
Dept: INTERNAL MEDICINE | Facility: CLINIC | Age: 70
End: 2020-10-26
Payer: COMMERCIAL

## 2020-10-27 ENCOUNTER — APPOINTMENT (OUTPATIENT)
Dept: INTERNAL MEDICINE | Facility: CLINIC | Age: 70
End: 2020-10-27
Payer: COMMERCIAL

## 2020-10-27 VITALS
BODY MASS INDEX: 34.07 KG/M2 | WEIGHT: 230 LBS | SYSTOLIC BLOOD PRESSURE: 111 MMHG | HEART RATE: 68 BPM | TEMPERATURE: 98 F | RESPIRATION RATE: 16 BRPM | DIASTOLIC BLOOD PRESSURE: 68 MMHG | HEIGHT: 69 IN

## 2020-10-27 PROCEDURE — 99072 ADDL SUPL MATRL&STAF TM PHE: CPT

## 2020-10-27 PROCEDURE — 99213 OFFICE O/P EST LOW 20 MIN: CPT

## 2020-11-06 NOTE — ASSESSMENT
[FreeTextEntry1] : Liver abscess, choledcholithiases\par \par completed prolonged IV abx as outlined above with weekly labs\par \par Needs GI f/u\par \par RTC as needed [Treatment Education] : treatment education [Anticipatory Guidance] : anticipatory guidance

## 2020-11-06 NOTE — HISTORY OF PRESENT ILLNESS
[FreeTextEntry1] : Admitted 10/10-10/14\par \par \par 69 years old male with PMH of Cholelithiasis / Choledocholithiasis s/p ERCP + Lap Cholecystectomy, Hepatitis B (in remission), Renal Calculi, HTN, HLD, Gout, Osteoarthritis, COVID-19 and Sleep Apnea on Nocturnal CPAP referred to [facility]  by his PMD with liver lesion. As per patient, he was having RUQ pain for 2-3 weeks, went to PMD who ordered CT Abdomen which showed 3.2 x 4.4 x 3.3 cm necrotic subcapsular right hepatic lobe lesion with small amount of adjacent fluid. Had MRCPwhich showed choledocholithiasis with mild biliary dilatation and 4.4 cm liver abscess in right subhepatic space. Seen by IR who recommended surgical evaluation. Seen by GI who discussed with Surgery and no surgical intervention was indicated. GI  recommend ERCP so he was transferred to [facility].\par \par - s/p ERCP in 10/12, with retained stones. Needs repeat ERCP in 6-8 weeks for stent removal and possible spyglass stone disruption\par - s/p IR drainage of liver abscess on 10/13; with cx sent\par - patient was not willing to stay for IR CX\par - given presence of liver abscess suggested prolonged IV abx\par - plan PICC line and Ertapenem 1 g IV daily through 11/10/20 with weekly CBC CMP\par - will need to f/u Id in 1-2 weeks to f/u IR cx and modify regimen as necessary\par -IR cx grew moderate anaerobic GNR, susceptibilities not performed\par -patient doing well no issues with picc and tolerating abx\par \par

## 2020-11-10 ENCOUNTER — APPOINTMENT (OUTPATIENT)
Dept: GASTROENTEROLOGY | Facility: CLINIC | Age: 70
End: 2020-11-10
Payer: COMMERCIAL

## 2020-11-10 VITALS
DIASTOLIC BLOOD PRESSURE: 90 MMHG | HEART RATE: 66 BPM | HEIGHT: 69 IN | RESPIRATION RATE: 15 BRPM | BODY MASS INDEX: 36.43 KG/M2 | OXYGEN SATURATION: 97 % | SYSTOLIC BLOOD PRESSURE: 150 MMHG | WEIGHT: 246 LBS

## 2020-11-10 DIAGNOSIS — Z98.890 OTHER SPECIFIED POSTPROCEDURAL STATES: ICD-10-CM

## 2020-11-10 DIAGNOSIS — Z09 ENCOUNTER FOR FOLLOW-UP EXAMINATION AFTER COMPLETED TREATMENT FOR CONDITIONS OTHER THAN MALIGNANT NEOPLASM: ICD-10-CM

## 2020-11-10 DIAGNOSIS — K75.0 ABSCESS OF LIVER: ICD-10-CM

## 2020-11-10 PROCEDURE — 99214 OFFICE O/P EST MOD 30 MIN: CPT

## 2020-11-10 PROCEDURE — 99072 ADDL SUPL MATRL&STAF TM PHE: CPT

## 2020-11-10 RX ORDER — URSODIOL 300 MG/1
300 CAPSULE ORAL
Refills: 0 | Status: ACTIVE | COMMUNITY

## 2020-11-10 RX ORDER — METOPROLOL SUCCINATE 50 MG/1
50 TABLET, EXTENDED RELEASE ORAL
Refills: 0 | Status: ACTIVE | COMMUNITY

## 2020-11-10 RX ORDER — PRAVASTATIN SODIUM 40 MG/1
40 TABLET ORAL
Refills: 0 | Status: ACTIVE | COMMUNITY

## 2020-11-10 NOTE — ASSESSMENT
[FreeTextEntry1] : At this time, I am recommending to consider MRI with MRCP, and repeat the blood work. We will schedule him for repeat ERCP with possible stent removal and stone removal. We will reach out to the patient.\par \par Stone Puckett MD\par Gastroenterology \par \par

## 2020-11-10 NOTE — HISTORY OF PRESENT ILLNESS
[de-identified] : The patient arrived for a followup after the hospitalization. He has history of cholecystectomy in the past. He was admitted with liver abscess and biliary obstruction. He underwent ERCP with extension of the sphincterotomy and attempt at mechanical lithotripsy, and a bile duct stent placement. Liver abscess was aspirated. He just completed, the IV antibiotics. He is taking ursodiol. He is denying any other complaints. He is very upset about as he has to undergo another ERCP.

## 2020-11-11 DIAGNOSIS — K80.51 CALCULUS OF BILE DUCT W/OUT CHOLANGITIS OR CHOLECYSTITIS WITH OBSTRUCTION: ICD-10-CM

## 2020-12-01 ENCOUNTER — OUTPATIENT (OUTPATIENT)
Dept: OUTPATIENT SERVICES | Facility: HOSPITAL | Age: 70
LOS: 1 days | End: 2020-12-01
Payer: COMMERCIAL

## 2020-12-01 ENCOUNTER — APPOINTMENT (OUTPATIENT)
Dept: MRI IMAGING | Facility: CLINIC | Age: 70
End: 2020-12-01
Payer: COMMERCIAL

## 2020-12-01 DIAGNOSIS — Z90.49 ACQUIRED ABSENCE OF OTHER SPECIFIED PARTS OF DIGESTIVE TRACT: Chronic | ICD-10-CM

## 2020-12-01 DIAGNOSIS — Z96.659 PRESENCE OF UNSPECIFIED ARTIFICIAL KNEE JOINT: Chronic | ICD-10-CM

## 2020-12-01 DIAGNOSIS — Z98.890 OTHER SPECIFIED POSTPROCEDURAL STATES: Chronic | ICD-10-CM

## 2020-12-01 DIAGNOSIS — Z98.89 OTHER SPECIFIED POSTPROCEDURAL STATES: Chronic | ICD-10-CM

## 2020-12-01 DIAGNOSIS — K80.51 CALCULUS OF BILE DUCT WITHOUT CHOLANGITIS OR CHOLECYSTITIS WITH OBSTRUCTION: ICD-10-CM

## 2020-12-01 DIAGNOSIS — Z00.8 ENCOUNTER FOR OTHER GENERAL EXAMINATION: ICD-10-CM

## 2020-12-01 PROCEDURE — 74183 MRI ABD W/O CNTR FLWD CNTR: CPT

## 2020-12-01 PROCEDURE — 74183 MRI ABD W/O CNTR FLWD CNTR: CPT | Mod: 26

## 2020-12-01 PROCEDURE — A9585: CPT

## 2020-12-14 ENCOUNTER — APPOINTMENT (OUTPATIENT)
Dept: UROLOGY | Facility: CLINIC | Age: 70
End: 2020-12-14
Payer: COMMERCIAL

## 2020-12-14 VITALS
OXYGEN SATURATION: 95 % | WEIGHT: 240 LBS | HEART RATE: 61 BPM | TEMPERATURE: 97 F | SYSTOLIC BLOOD PRESSURE: 125 MMHG | BODY MASS INDEX: 35.96 KG/M2 | HEIGHT: 68.5 IN | DIASTOLIC BLOOD PRESSURE: 76 MMHG

## 2020-12-14 PROCEDURE — 99072 ADDL SUPL MATRL&STAF TM PHE: CPT

## 2020-12-14 PROCEDURE — 99213 OFFICE O/P EST LOW 20 MIN: CPT

## 2020-12-14 NOTE — HISTORY OF PRESENT ILLNESS
[FreeTextEntry1] : This patient is here for his annual check up. He is doing well and has no new urinary complaints.

## 2020-12-29 NOTE — ED ADULT NURSE NOTE - CARDIO WDL
chart, RN, PA./patient/other (specify)
Normal rate, regular rhythm, normal S1, S2 heart sounds heard.
69.8

## 2021-01-16 DIAGNOSIS — Z01.818 ENCOUNTER FOR OTHER PREPROCEDURAL EXAMINATION: ICD-10-CM

## 2021-01-17 ENCOUNTER — APPOINTMENT (OUTPATIENT)
Dept: DISASTER EMERGENCY | Facility: CLINIC | Age: 71
End: 2021-01-17

## 2021-01-18 LAB — SARS-COV-2 N GENE NPH QL NAA+PROBE: NOT DETECTED

## 2021-01-20 ENCOUNTER — TRANSCRIPTION ENCOUNTER (OUTPATIENT)
Age: 71
End: 2021-01-20

## 2021-01-21 ENCOUNTER — APPOINTMENT (OUTPATIENT)
Dept: GASTROENTEROLOGY | Facility: HOSPITAL | Age: 71
End: 2021-01-21

## 2021-01-21 ENCOUNTER — OUTPATIENT (OUTPATIENT)
Dept: OUTPATIENT SERVICES | Facility: HOSPITAL | Age: 71
LOS: 1 days | End: 2021-01-21
Payer: COMMERCIAL

## 2021-01-21 ENCOUNTER — RESULT REVIEW (OUTPATIENT)
Age: 71
End: 2021-01-21

## 2021-01-21 DIAGNOSIS — Z90.49 ACQUIRED ABSENCE OF OTHER SPECIFIED PARTS OF DIGESTIVE TRACT: Chronic | ICD-10-CM

## 2021-01-21 DIAGNOSIS — Z98.890 OTHER SPECIFIED POSTPROCEDURAL STATES: Chronic | ICD-10-CM

## 2021-01-21 DIAGNOSIS — Z96.659 PRESENCE OF UNSPECIFIED ARTIFICIAL KNEE JOINT: Chronic | ICD-10-CM

## 2021-01-21 DIAGNOSIS — K80.51 CALCULUS OF BILE DUCT WITHOUT CHOLANGITIS OR CHOLECYSTITIS WITH OBSTRUCTION: ICD-10-CM

## 2021-01-21 DIAGNOSIS — Z98.89 OTHER SPECIFIED POSTPROCEDURAL STATES: Chronic | ICD-10-CM

## 2021-01-21 PROCEDURE — 88300 SURGICAL PATH GROSS: CPT

## 2021-01-21 PROCEDURE — 43264 ERCP REMOVE DUCT CALCULI: CPT

## 2021-01-21 PROCEDURE — C1769: CPT

## 2021-01-21 PROCEDURE — 88300 SURGICAL PATH GROSS: CPT | Mod: 26

## 2021-01-21 PROCEDURE — C1773: CPT

## 2021-01-21 PROCEDURE — C1726: CPT

## 2021-01-21 PROCEDURE — 74330 X-RAY BILE/PANC ENDOSCOPY: CPT

## 2021-01-21 PROCEDURE — 43275 ERCP REMOVE FORGN BODY DUCT: CPT | Mod: 59

## 2021-01-21 PROCEDURE — 43273 ENDOSCOPIC PANCREATOSCOPY: CPT

## 2021-01-21 PROCEDURE — C9399: CPT

## 2021-01-21 PROCEDURE — 43277 ERCP EA DUCT/AMPULLA DILATE: CPT | Mod: 59

## 2021-01-21 PROCEDURE — 43275 ERCP REMOVE FORGN BODY DUCT: CPT

## 2021-01-22 LAB — SURGICAL PATHOLOGY STUDY: SIGNIFICANT CHANGE UP

## 2021-01-26 NOTE — ED ADULT NURSE NOTE - PRO INTERPRETER NEED 2
Pt called stated she has been getting period twice on a month on current birth control and would like to change it if possible,   Has not missed a pill, takes it same time every day      Last seen in August 2020 English

## 2021-03-08 LAB
APPEARANCE: CLEAR
BACTERIA: NEGATIVE
BILIRUBIN URINE: NEGATIVE
BLOOD URINE: NEGATIVE
COLOR: YELLOW
GLUCOSE QUALITATIVE U: NEGATIVE
HYALINE CASTS: 0 /LPF
KETONES URINE: NEGATIVE
LEUKOCYTE ESTERASE URINE: NEGATIVE
MICROSCOPIC-UA: NORMAL
NITRITE URINE: NEGATIVE
PH URINE: 6
PROTEIN URINE: NORMAL
PSA SERPL-MCNC: 0.33 NG/ML
RED BLOOD CELLS URINE: 1 /HPF
SPECIFIC GRAVITY URINE: 1.03
SQUAMOUS EPITHELIAL CELLS: 0 /HPF
UROBILINOGEN URINE: NORMAL
WHITE BLOOD CELLS URINE: 1 /HPF

## 2021-03-09 ENCOUNTER — RX RENEWAL (OUTPATIENT)
Age: 71
End: 2021-03-09

## 2021-03-09 ENCOUNTER — NON-APPOINTMENT (OUTPATIENT)
Age: 71
End: 2021-03-09

## 2021-03-09 LAB
BACTERIA UR CULT: NORMAL
URINE CYTOLOGY: NORMAL

## 2021-03-09 RX ORDER — SILDENAFIL 20 MG/1
20 TABLET ORAL
Qty: 90 | Refills: 11 | Status: DISCONTINUED | COMMUNITY
Start: 2018-09-28 | End: 2021-03-09

## 2021-07-14 NOTE — DISCHARGE NOTE NURSING/CASE MANAGEMENT/SOCIAL WORK - NSDPLANG ASIS_GEN_ALL_CORE
PAST SURGICAL HISTORY:  History of endoscopic sinus surgery     History of umbilical hernia repair     Inguinal hernia, right     
No

## 2021-09-15 ENCOUNTER — TRANSCRIPTION ENCOUNTER (OUTPATIENT)
Age: 71
End: 2021-09-15

## 2021-12-02 RX ORDER — POTASSIUM CITRATE 10 MEQ/1
10 MEQ TABLET, EXTENDED RELEASE ORAL TWICE DAILY
Qty: 180 | Refills: 0 | Status: COMPLETED | COMMUNITY
Start: 2017-12-20 | End: 2021-12-02

## 2021-12-02 RX ORDER — AMOXICILLIN AND CLAVULANATE POTASSIUM 875; 125 MG/1; MG/1
875-125 TABLET, COATED ORAL
Qty: 20 | Refills: 0 | Status: COMPLETED | COMMUNITY
Start: 2020-07-08 | End: 2021-12-02

## 2021-12-14 ENCOUNTER — APPOINTMENT (OUTPATIENT)
Dept: UROLOGY | Facility: CLINIC | Age: 71
End: 2021-12-14
Payer: COMMERCIAL

## 2021-12-14 VITALS
HEIGHT: 69 IN | HEART RATE: 71 BPM | DIASTOLIC BLOOD PRESSURE: 83 MMHG | SYSTOLIC BLOOD PRESSURE: 134 MMHG | BODY MASS INDEX: 35.55 KG/M2 | OXYGEN SATURATION: 95 % | WEIGHT: 240 LBS

## 2021-12-14 PROCEDURE — 99213 OFFICE O/P EST LOW 20 MIN: CPT

## 2021-12-14 NOTE — HISTORY OF PRESENT ILLNESS
[FreeTextEntry1] : This patient is here for a checkup.  He has a urinary stone former on preventative medications.  He states that he is voiding well

## 2021-12-14 NOTE — END OF VISIT
[FreeTextEntry3] : His labs and a renal sonogram are ordered and his medications were refilled.  He will follow-up in 1 year

## 2021-12-15 LAB
APPEARANCE: CLEAR
BACTERIA: NEGATIVE
BILIRUBIN URINE: NEGATIVE
BLOOD URINE: NEGATIVE
COLOR: NORMAL
GLUCOSE QUALITATIVE U: NEGATIVE
HYALINE CASTS: 0 /LPF
KETONES URINE: NEGATIVE
LEUKOCYTE ESTERASE URINE: NEGATIVE
MICROSCOPIC-UA: NORMAL
NITRITE URINE: NEGATIVE
PH URINE: 5.5
PROTEIN URINE: NEGATIVE
PSA SERPL-MCNC: 0.35 NG/ML
RED BLOOD CELLS URINE: 1 /HPF
SPECIFIC GRAVITY URINE: 1.02
SQUAMOUS EPITHELIAL CELLS: 0 /HPF
UROBILINOGEN URINE: NORMAL
WHITE BLOOD CELLS URINE: 0 /HPF

## 2021-12-16 LAB — BACTERIA UR CULT: NORMAL

## 2021-12-21 LAB — URINE CYTOLOGY: NORMAL

## 2022-01-24 ENCOUNTER — OUTPATIENT (OUTPATIENT)
Dept: OUTPATIENT SERVICES | Facility: HOSPITAL | Age: 72
LOS: 1 days | End: 2022-01-24
Payer: COMMERCIAL

## 2022-01-24 ENCOUNTER — APPOINTMENT (OUTPATIENT)
Dept: ULTRASOUND IMAGING | Facility: CLINIC | Age: 72
End: 2022-01-24
Payer: COMMERCIAL

## 2022-01-24 DIAGNOSIS — Z96.659 PRESENCE OF UNSPECIFIED ARTIFICIAL KNEE JOINT: Chronic | ICD-10-CM

## 2022-01-24 DIAGNOSIS — Z98.890 OTHER SPECIFIED POSTPROCEDURAL STATES: Chronic | ICD-10-CM

## 2022-01-24 DIAGNOSIS — Z90.49 ACQUIRED ABSENCE OF OTHER SPECIFIED PARTS OF DIGESTIVE TRACT: Chronic | ICD-10-CM

## 2022-01-24 DIAGNOSIS — Z98.89 OTHER SPECIFIED POSTPROCEDURAL STATES: Chronic | ICD-10-CM

## 2022-01-24 DIAGNOSIS — N20.0 CALCULUS OF KIDNEY: ICD-10-CM

## 2022-01-24 PROCEDURE — 76775 US EXAM ABDO BACK WALL LIM: CPT

## 2022-01-24 PROCEDURE — 76775 US EXAM ABDO BACK WALL LIM: CPT | Mod: 26

## 2022-01-27 ENCOUNTER — NON-APPOINTMENT (OUTPATIENT)
Age: 72
End: 2022-01-27

## 2022-03-21 NOTE — ED PROVIDER NOTE - CPE EDP ENMT NORM
[FreeTextEntry1] :  Patient with CLL, developed PE post his urological procedure... Remains on Eliquis...\par \par Patient developed worsening anemia with low haptoglobin rule out Kasandra positive hemolytic anemia.\par \par Repeat blood work done , including direct Kasandra.\par \par \par Follow-up here in 3 months or sooner if needed
normal...

## 2022-03-22 ENCOUNTER — APPOINTMENT (OUTPATIENT)
Dept: MRI IMAGING | Facility: CLINIC | Age: 72
End: 2022-03-22

## 2022-03-25 ENCOUNTER — APPOINTMENT (OUTPATIENT)
Dept: MRI IMAGING | Facility: CLINIC | Age: 72
End: 2022-03-25
Payer: COMMERCIAL

## 2022-03-25 ENCOUNTER — OUTPATIENT (OUTPATIENT)
Dept: OUTPATIENT SERVICES | Facility: HOSPITAL | Age: 72
LOS: 1 days | End: 2022-03-25
Payer: COMMERCIAL

## 2022-03-25 DIAGNOSIS — Z98.89 OTHER SPECIFIED POSTPROCEDURAL STATES: Chronic | ICD-10-CM

## 2022-03-25 DIAGNOSIS — Z98.890 OTHER SPECIFIED POSTPROCEDURAL STATES: Chronic | ICD-10-CM

## 2022-03-25 DIAGNOSIS — Z90.49 ACQUIRED ABSENCE OF OTHER SPECIFIED PARTS OF DIGESTIVE TRACT: Chronic | ICD-10-CM

## 2022-03-25 DIAGNOSIS — Z96.659 PRESENCE OF UNSPECIFIED ARTIFICIAL KNEE JOINT: Chronic | ICD-10-CM

## 2022-03-25 DIAGNOSIS — Z00.8 ENCOUNTER FOR OTHER GENERAL EXAMINATION: ICD-10-CM

## 2022-03-25 PROCEDURE — 72148 MRI LUMBAR SPINE W/O DYE: CPT | Mod: 26

## 2022-03-25 PROCEDURE — 72148 MRI LUMBAR SPINE W/O DYE: CPT

## 2022-05-26 NOTE — H&P ADULT - ENMT
detailed exam mouth Cheek Interpolation Flap Text: A decision was made to reconstruct the defect utilizing an interpolation axial flap and a staged reconstruction.  A telfa template was made of the defect.  This telfa template was then used to outline the Cheek Interpolation flap.  The donor area for the pedicle flap was then injected with anesthesia.  The flap was excised through the skin and subcutaneous tissue down to the layer of the underlying musculature.  The interpolation flap was carefully excised within this deep plane to maintain its blood supply.  The edges of the donor site were undermined.   The donor site was closed in a primary fashion.  The pedicle was then rotated into position and sutured.  Once the tube was sutured into place, adequate blood supply was confirmed with blanching and refill.  The pedicle was then wrapped with xeroform gauze and dressed appropriately with a telfa and gauze bandage to ensure continued blood supply and protect the attached pedicle.

## 2022-06-13 NOTE — PROGRESS NOTE ADULT - ASSESSMENT
-- DO NOT REPLY / DO NOT REPLY ALL --  -- Message is from the [facility]--    Referral Request  Name of Specialist: Dr. Weiss  Provider's specialty: podiatry    Medical condition for referral:  Ingrown oe nail- appointment on 7/1    Is this a NEW request?: yes      Referral ordered by: Dr. Vazquez      Insurance type:       Payor: [place] MEDICAID / Plan: [place]ADITIONAL [place] MEDICAID / Product Type: T19      Preferred Delivery Method   Call when ready for pickup - phone number to notify: [phone]    Caller Information       Type Contact Phone    06/13/2022 09:10 AM CDT Phone (Incoming) Brie Munroe (Emergency Contact) [phone]          Alternative phone number: none    Turnaround time given to caller:   \"This message will be sent to [state Provider's full name]. The clinical team will return your call as soon as they review your message. Typically, it takes 3 business days to process referral requests.\"   69 yo male obese admitted tx from  for CBD stone , cholelithiases for ERCP     pt is spiking fever , MRCP showed CBD stone with suspected cholecystitis , he was started on iv zosyn seen by GI and surgery , ERCP today     1- Choledocholithiasis with cholecystitis  s/p ERCP CBD stone extracted   surgery follow up for cholecystectomy  continue iv zosyn , pain meds     2- Hypophosphatemia   iv phos supplement ordered     3- CHEL   on CIPAP at night

## 2022-08-10 ENCOUNTER — NON-APPOINTMENT (OUTPATIENT)
Age: 72
End: 2022-08-10

## 2022-10-21 ENCOUNTER — APPOINTMENT (OUTPATIENT)
Dept: UROLOGY | Facility: CLINIC | Age: 72
End: 2022-10-21

## 2022-10-21 VITALS
DIASTOLIC BLOOD PRESSURE: 68 MMHG | HEIGHT: 69 IN | BODY MASS INDEX: 33.33 KG/M2 | OXYGEN SATURATION: 94 % | HEART RATE: 67 BPM | WEIGHT: 225 LBS | SYSTOLIC BLOOD PRESSURE: 128 MMHG

## 2022-10-21 PROCEDURE — 99213 OFFICE O/P EST LOW 20 MIN: CPT

## 2022-10-22 LAB
APPEARANCE: CLEAR
BACTERIA: NEGATIVE
BILIRUBIN URINE: NEGATIVE
BLOOD URINE: NEGATIVE
COLOR: YELLOW
GLUCOSE QUALITATIVE U: NEGATIVE
HYALINE CASTS: 0 /LPF
KETONES URINE: NEGATIVE
LEUKOCYTE ESTERASE URINE: NEGATIVE
MICROSCOPIC-UA: NORMAL
NITRITE URINE: NEGATIVE
PH URINE: 6
PROTEIN URINE: NEGATIVE
PSA SERPL-MCNC: 0.46 NG/ML
RED BLOOD CELLS URINE: 1 /HPF
SPECIFIC GRAVITY URINE: 1.02
SQUAMOUS EPITHELIAL CELLS: 1 /HPF
UROBILINOGEN URINE: NORMAL
WHITE BLOOD CELLS URINE: 1 /HPF

## 2022-10-24 NOTE — HISTORY OF PRESENT ILLNESS
[FreeTextEntry1] : 71M presents today as a renal stone former for a checkup. He is having back related problems but from a urinary standpoint, he seems to be doing fine. He needs a refill on his stone prevention medication and Sildenafil.

## 2022-10-26 LAB — URINE CYTOLOGY: NORMAL

## 2022-11-03 ENCOUNTER — OUTPATIENT (OUTPATIENT)
Dept: OUTPATIENT SERVICES | Facility: HOSPITAL | Age: 72
LOS: 1 days | End: 2022-11-03
Payer: COMMERCIAL

## 2022-11-03 ENCOUNTER — APPOINTMENT (OUTPATIENT)
Dept: CT IMAGING | Facility: CLINIC | Age: 72
End: 2022-11-03

## 2022-11-03 DIAGNOSIS — Z96.659 PRESENCE OF UNSPECIFIED ARTIFICIAL KNEE JOINT: Chronic | ICD-10-CM

## 2022-11-03 DIAGNOSIS — Z98.890 OTHER SPECIFIED POSTPROCEDURAL STATES: Chronic | ICD-10-CM

## 2022-11-03 DIAGNOSIS — Z90.49 ACQUIRED ABSENCE OF OTHER SPECIFIED PARTS OF DIGESTIVE TRACT: Chronic | ICD-10-CM

## 2022-11-03 DIAGNOSIS — Z98.89 OTHER SPECIFIED POSTPROCEDURAL STATES: Chronic | ICD-10-CM

## 2022-11-03 DIAGNOSIS — R89.6 ABNORMAL CYTOLOGICAL FINDINGS IN SPECIMENS FROM OTHER ORGANS, SYSTEMS AND TISSUES: ICD-10-CM

## 2022-11-03 DIAGNOSIS — Z00.8 ENCOUNTER FOR OTHER GENERAL EXAMINATION: ICD-10-CM

## 2022-11-03 PROCEDURE — 74178 CT ABD&PLV WO CNTR FLWD CNTR: CPT

## 2022-11-03 PROCEDURE — 74178 CT ABD&PLV WO CNTR FLWD CNTR: CPT | Mod: 26

## 2022-11-10 ENCOUNTER — APPOINTMENT (OUTPATIENT)
Dept: PULMONOLOGY | Facility: CLINIC | Age: 72
End: 2022-11-10

## 2022-11-10 VITALS
HEART RATE: 72 BPM | HEIGHT: 69 IN | BODY MASS INDEX: 32.58 KG/M2 | DIASTOLIC BLOOD PRESSURE: 86 MMHG | SYSTOLIC BLOOD PRESSURE: 144 MMHG | TEMPERATURE: 98 F | RESPIRATION RATE: 17 BRPM | WEIGHT: 220 LBS | OXYGEN SATURATION: 97 %

## 2022-11-10 DIAGNOSIS — G47.33 OBSTRUCTIVE SLEEP APNEA (ADULT) (PEDIATRIC): ICD-10-CM

## 2022-11-10 DIAGNOSIS — R93.89 ABNORMAL FINDINGS ON DIAGNOSTIC IMAGING OF OTHER SPECIFIED BODY STRUCTURES: ICD-10-CM

## 2022-11-10 PROCEDURE — 99204 OFFICE O/P NEW MOD 45 MIN: CPT

## 2022-11-10 NOTE — PHYSICAL EXAM
[No Acute Distress] : no acute distress [Well Nourished] : well nourished [No Deformities] : no deformities [Well Developed] : well developed [Normal Appearance] : normal appearance [No Neck Mass] : no neck mass [Normal Rate/Rhythm] : normal rate/rhythm [Normal S1, S2] : normal s1, s2 [No Murmurs] : no murmurs [No Resp Distress] : no resp distress [Clear to Auscultation Bilaterally] : clear to auscultation bilaterally [No Abnormalities] : no abnormalities [Normal Gait] : normal gait [No Clubbing] : no clubbing [No Cyanosis] : no cyanosis [No Edema] : no edema [FROM] : FROM [Normal Color/ Pigmentation] : normal color/ pigmentation [No Focal Deficits] : no focal deficits [Oriented x3] : oriented x3 [Normal Affect] : normal affect [TextBox_2] : Patient visibly anxious.

## 2022-11-10 NOTE — HISTORY OF PRESENT ILLNESS
[TextBox_4] : Mr. Carrera is a 72 year old, nonsmoking, male. He has past medical history of arthritis, kidney stones, renal cyst, HLD, seasonal allergies - on allergy shots in past, CHEL on PAP therapy, Abnormal Chest CT w/ nodule & Covid-19 infection 2020. He presents for an initial pulmonary evaluation.  His spouse is present via phone.\par \par His chief concern is abnormal Chest CT. \par \par Patient seen by pulmonologist, Dr. Lv Stockton in the past. He states at that time he was aware of pulmonary nodule, dating back 16 years.  He notes 16 years ago he presented to [facility] ED with left sided chest pain.  He notes Chest CT revealed nodules to be present and he was diagnosed with cancer.  He notes after establishing care with Dr. Stockton, he was told he does not have lung cancer.  Dr. Stockton had performed follow up Chest CT every 6 months for 2 years.  He states that Dr. Stockton felt that nodules were related to prior infection from childhood and since most resolved or remained stable, he had no further concern. \par \par Patient does remember having frequent cough through out childhood.  He states he recalls the pediatrician telling his mother to have him sleep with a window open as the cold air would help his cough. Patient notes he has been seen by an allergist in the past and has been on allergy shots. \par \par Patient states he was diagnosed with CHEL 7-8 years ago.  He notes current PAP device is 5-6 years old. \par \par He denies fever/chills, decreased appetite, fatigue, SOB @ rest or exertion, cough, wheezing, chest tightness or any other issues at this time.

## 2022-11-10 NOTE — ASSESSMENT
[FreeTextEntry1] : Mr. Carrera is a 72 year old, nonsmoking, male. He has past medical history of arthritis, kidney stones, renal cyst, HLD, CHEL on PAP therapy, seasonal allergies - on allergy shots in past,  Abnormal Chest CT w/ nodule & Covid-19 infection 2020. He presents for an initial pulmonary evaluation.  His spouse is present via phone. His chief concern is abnormal Chest CT. \par \par 1. Abnormal Chest CT w/ LLL Nodule:\par - PET-CT RX'ed for further evaluation.\par - CTS contact information provided at check out.\par \par 2. CHEL: \par - Patient has known history - currently on CPAP. He notes his current device is 5-6 years old. \par - Advised if he would like a new device that a repeat sleep study needs to be done to prove diagnosis of CHEL. Can try to have HST if insurance allows. \par - Patient would like to hold off at this time as he would like to focus on abnormal Chest CT first. \par \par Patient to follow up with Dr. Buenrostro as scheduled for visit and PFT's 1/17/2023. \par Patient to call with further questions and concerns.\par Patient verbalizes understanding of care plan and is agreeable.\par \par

## 2022-11-10 NOTE — REVIEW OF SYSTEMS
[Negative] : Endocrine [Arthralgias] : arthralgias [Chronic Pain] : chronic pain [Anxiety] : anxiety

## 2022-11-10 NOTE — DISCUSSION/SUMMARY
[FreeTextEntry1] : 11/3/22 CHEST CT: 1.1 cm left lower lobe pulmonary nodule is slightly increased in size compared with 2019 (9 mm) and with prior examinations dating back to 2009 (4 mm).

## 2022-11-30 ENCOUNTER — APPOINTMENT (OUTPATIENT)
Dept: UROLOGY | Facility: CLINIC | Age: 72
End: 2022-11-30

## 2022-11-30 VITALS
SYSTOLIC BLOOD PRESSURE: 153 MMHG | BODY MASS INDEX: 32.58 KG/M2 | HEART RATE: 78 BPM | OXYGEN SATURATION: 94 % | HEIGHT: 69 IN | DIASTOLIC BLOOD PRESSURE: 79 MMHG | WEIGHT: 220 LBS

## 2022-11-30 DIAGNOSIS — R89.6 ABNORMAL CYTOLOGICAL FINDINGS IN SPECIMENS FROM OTHER ORGANS, SYSTEMS AND TISSUES: ICD-10-CM

## 2022-11-30 PROCEDURE — 52000 CYSTOURETHROSCOPY: CPT

## 2022-12-07 ENCOUNTER — APPOINTMENT (OUTPATIENT)
Dept: THORACIC SURGERY | Facility: CLINIC | Age: 72
End: 2022-12-07

## 2022-12-07 VITALS
OXYGEN SATURATION: 95 % | WEIGHT: 220 LBS | HEART RATE: 68 BPM | BODY MASS INDEX: 32.58 KG/M2 | SYSTOLIC BLOOD PRESSURE: 148 MMHG | HEIGHT: 69 IN | DIASTOLIC BLOOD PRESSURE: 83 MMHG

## 2022-12-07 PROCEDURE — 99245 OFF/OP CONSLTJ NEW/EST HI 55: CPT

## 2022-12-07 NOTE — PHYSICAL EXAM
[General Appearance - Alert] : alert [General Appearance - In No Acute Distress] : in no acute distress [General Appearance - Well Nourished] : well nourished [General Appearance - Well Developed] : well developed [Sclera] : the sclera and conjunctiva were normal [Outer Ear] : the ears and nose were normal in appearance [Neck Appearance] : the appearance of the neck was normal [] : the neck was supple [Auscultation Breath Sounds / Voice Sounds] : lungs were clear to auscultation bilaterally [Heart Rate And Rhythm] : heart rate was normal and rhythm regular [Murmurs] : no murmurs [Examination Of The Chest] : the chest was normal in appearance [Chest Visual Inspection Thoracic Asymmetry] : no chest asymmetry [Edema] : there was no peripheral edema [Bowel Sounds] : normal bowel sounds [Abdomen Soft] : soft [Cervical Lymph Nodes Enlarged Posterior Bilaterally] : posterior cervical [Cervical Lymph Nodes Enlarged Anterior Bilaterally] : anterior cervical [Supraclavicular Lymph Nodes Enlarged Bilaterally] : supraclavicular [No CVA Tenderness] : no ~M costovertebral angle tenderness [No Spinal Tenderness] : no spinal tenderness [Nail Clubbing] : no clubbing  or cyanosis of the fingernails [Skin Color & Pigmentation] : normal skin color and pigmentation [Oriented To Time, Place, And Person] : oriented to person, place, and time [Impaired Insight] : insight and judgment were intact [Affect] : the affect was normal

## 2022-12-07 NOTE — CONSULT LETTER
[Dear  ___] : Dear  [unfilled], [Consult Letter:] : I had the pleasure of evaluating your patient, [unfilled]. [( Thank you for referring [unfilled] for consultation for _____ )] : Thank you for referring [unfilled] for consultation for [unfilled] [Please see my note below.] : Please see my note below. [Consult Closing:] : Thank you very much for allowing me to participate in the care of this patient.  If you have any questions, please do not hesitate to contact me. [Sincerely,] : Sincerely, [FreeTextEntry2] : Dr. Dre Buenrostro (pulm/ref) [FreeTextEntry3] : Jaime Francis MD\par Attending Surgeon\par Division of Thoracic Surgery\par , Cardiovascular and Thoracic Surgery\par [facility]

## 2022-12-07 NOTE — ASSESSMENT
[FreeTextEntry1] : Mr. SHWETA VELAZCO, 72 year old male with a smooth bordered slowly enlarging 1.1 cm left lower lobe pulmonary nodule is slightly increased in size compared with 2019 (9 mm) and with prior examinations dating back to 2009 (4 mm).  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 I have asked the patient to obtain a dedicated CT of the chest to further evaluate the lesion and the rest of the lung. The patient will follow up with me once it obtained. \par \par Recommendations reviewed with patient during this office visit, and all questions answered; Patient instructed on the importance of follow up and verbalizes understanding.

## 2022-12-07 NOTE — HISTORY OF PRESENT ILLNESS
[FreeTextEntry1] : Mr. SHWETA VELAZCO, 72 year old male, never smoker, w/ hx of arthritis, kidney stones, renal cyst, HLD, seasonal allergies - on allergy shots in past, CHEL on PAP therapy, Abnormal Chest CT w/ nodule (known for about 16 years) & Covid-19 infection 2020.\par \par CT Abd/Pelvis on 11/03/2022:\par - 1.1 cm left lower lobe pulmonary nodule is slightly increased in size compared with 2019 (9 mm) and with prior examinations dating back to 2009 (4 mm).\par \par \par Patient is here today for CT Sx consultation, referred by Dr. Dre Buenrostro (pulm) for enlarged LLL pulmonary nodule.\par \par Pt feels well without complaints

## 2023-01-03 ENCOUNTER — APPOINTMENT (OUTPATIENT)
Dept: CT IMAGING | Facility: CLINIC | Age: 73
End: 2023-01-03
Payer: COMMERCIAL

## 2023-01-03 ENCOUNTER — OUTPATIENT (OUTPATIENT)
Dept: OUTPATIENT SERVICES | Facility: HOSPITAL | Age: 73
LOS: 1 days | End: 2023-01-03
Payer: COMMERCIAL

## 2023-01-03 DIAGNOSIS — R91.1 SOLITARY PULMONARY NODULE: ICD-10-CM

## 2023-01-03 DIAGNOSIS — Z90.49 ACQUIRED ABSENCE OF OTHER SPECIFIED PARTS OF DIGESTIVE TRACT: Chronic | ICD-10-CM

## 2023-01-03 DIAGNOSIS — Z98.890 OTHER SPECIFIED POSTPROCEDURAL STATES: Chronic | ICD-10-CM

## 2023-01-03 DIAGNOSIS — Z00.8 ENCOUNTER FOR OTHER GENERAL EXAMINATION: ICD-10-CM

## 2023-01-03 DIAGNOSIS — Z98.89 OTHER SPECIFIED POSTPROCEDURAL STATES: Chronic | ICD-10-CM

## 2023-01-03 DIAGNOSIS — Z96.659 PRESENCE OF UNSPECIFIED ARTIFICIAL KNEE JOINT: Chronic | ICD-10-CM

## 2023-01-03 PROCEDURE — 71250 CT THORAX DX C-: CPT

## 2023-01-03 PROCEDURE — 71250 CT THORAX DX C-: CPT | Mod: 26

## 2023-01-11 ENCOUNTER — APPOINTMENT (OUTPATIENT)
Dept: THORACIC SURGERY | Facility: CLINIC | Age: 73
End: 2023-01-11
Payer: COMMERCIAL

## 2023-01-11 VITALS
OXYGEN SATURATION: 96 % | HEIGHT: 69 IN | DIASTOLIC BLOOD PRESSURE: 82 MMHG | HEART RATE: 70 BPM | SYSTOLIC BLOOD PRESSURE: 142 MMHG | BODY MASS INDEX: 32.58 KG/M2 | TEMPERATURE: 97.7 F | WEIGHT: 220 LBS

## 2023-01-11 PROCEDURE — 99214 OFFICE O/P EST MOD 30 MIN: CPT

## 2023-01-12 NOTE — ASSESSMENT
[FreeTextEntry1] : Mr. SHWETA VELAZCO, 72 year old male, never smoker, w/ hx of arthritis, kidney stones, renal cyst, HLD, seasonal allergies - on allergy shots in past, CHEL on PAP therapy, Abnormal Chest CT w/ nodule (known for about 16 years) & Covid-19 infection 2020, referred by Dr. Dre Buenrostro (pulm) for enlarged LLL pulmonary nodule. \par \par CT chest reviewed and explained to patient, LLL nodule, exact etiology is unclear, recommended a PET/CT for further evaluation. If PET/CT showed FDG avid lung nodule, will recommended surgical resection. If PET/CT is negative, will further evaluate with a DOTATATE scan. With positive Dotatate scan, will recommend surgical resection. With negative Dotatate scan, will recommend observation with CT chest. I have answered all of the patients questions and he understands the importance of follow up.\par \par \par

## 2023-01-12 NOTE — HISTORY OF PRESENT ILLNESS
[FreeTextEntry1] : Mr. SHWETA VELAZCO, 72 year old male, never smoker, w/ hx of arthritis, kidney stones, renal cyst, HLD, seasonal allergies - on allergy shots in past, CHEL on PAP therapy, Abnormal Chest CT w/ nodule (known for about 16 years) & Covid-19 infection 2020, referred by Dr. Dre Buenrostro (pulm) for enlarged LLL pulmonary nodule.\par \par CT Abd/Pelvis on 11/03/2022:\par - 1.1 cm left lower lobe pulmonary nodule is slightly increased in size compared with 2019 (9 mm) and with prior examinations dating back to 2009 (4 mm).\par \par CT chest on 01/03/2022: \par - 1 cm solid nodule in the left lower lobe has increased in size when compared to study of 7/22/2014. Exact etiology is unclear. \par \par Patient is here today for a follow up.\par

## 2023-01-12 NOTE — CONSULT LETTER
[Dear  ___] : Dear  [unfilled], [Consult Letter:] : I had the pleasure of evaluating your patient, [unfilled]. [( Thank you for referring [unfilled] for consultation for _____ )] : Thank you for referring [unfilled] for consultation for [unfilled] [Please see my note below.] : Please see my note below. [Consult Closing:] : Thank you very much for allowing me to participate in the care of this patient.  If you have any questions, please do not hesitate to contact me. [Sincerely,] : Sincerely, [FreeTextEntry2] : Dr. Dre Buenrostro (pulm/ref) [FreeTextEntry3] : Jaime Francis MD\par Attending Surgeon\par Division of Thoracic Surgery\par , Cardiovascular and Thoracic Surgery\par [facility]

## 2023-01-13 NOTE — ED PROVIDER NOTE - TEMPLATE, MLM
Abdominal Pain, N/V/D [Well Developed] : well developed [NAD] : in no acute distress [EOMI] : ~T the extraocular movements were normal and intact [icteric] : anicteric [Moist & Pink Mucous Membranes] : moist and pink mucous membranes [CTAB] : lungs clear to auscultation bilaterally [Respiratory Distress] : no respiratory distress  [Regular Rate and Rhythm] : regular rate and rhythm [Normal S1, S2] : normal S1 and S2 [Soft] : soft  [Distended] : non distended [Tender] : non tender [Normal Bowel Sounds] : normal bowel sounds [No HSM] : no hepatosplenomegaly appreciated [Normal Tone] : normal tone [Well-Perfused] : well-perfused [Edema] : no edema [Cyanosis] : no cyanosis [Rash] : no rash [Jaundice] : no jaundice [Interactive] : interactive

## 2023-01-17 ENCOUNTER — APPOINTMENT (OUTPATIENT)
Dept: PULMONOLOGY | Facility: CLINIC | Age: 73
End: 2023-01-17

## 2023-01-23 ENCOUNTER — APPOINTMENT (OUTPATIENT)
Dept: NUCLEAR MEDICINE | Facility: CLINIC | Age: 73
End: 2023-01-23
Payer: COMMERCIAL

## 2023-01-23 ENCOUNTER — OUTPATIENT (OUTPATIENT)
Dept: OUTPATIENT SERVICES | Facility: HOSPITAL | Age: 73
LOS: 1 days | End: 2023-01-23
Payer: COMMERCIAL

## 2023-01-23 DIAGNOSIS — Z90.49 ACQUIRED ABSENCE OF OTHER SPECIFIED PARTS OF DIGESTIVE TRACT: Chronic | ICD-10-CM

## 2023-01-23 DIAGNOSIS — Z98.89 OTHER SPECIFIED POSTPROCEDURAL STATES: Chronic | ICD-10-CM

## 2023-01-23 DIAGNOSIS — R91.1 SOLITARY PULMONARY NODULE: ICD-10-CM

## 2023-01-23 DIAGNOSIS — Z98.890 OTHER SPECIFIED POSTPROCEDURAL STATES: Chronic | ICD-10-CM

## 2023-01-23 DIAGNOSIS — Z96.659 PRESENCE OF UNSPECIFIED ARTIFICIAL KNEE JOINT: Chronic | ICD-10-CM

## 2023-01-23 PROCEDURE — 78815 PET IMAGE W/CT SKULL-THIGH: CPT | Mod: 26,PI

## 2023-01-23 PROCEDURE — A9552: CPT

## 2023-01-23 PROCEDURE — 78815 PET IMAGE W/CT SKULL-THIGH: CPT

## 2023-02-01 ENCOUNTER — APPOINTMENT (OUTPATIENT)
Dept: THORACIC SURGERY | Facility: CLINIC | Age: 73
End: 2023-02-01
Payer: COMMERCIAL

## 2023-02-01 VITALS
DIASTOLIC BLOOD PRESSURE: 94 MMHG | WEIGHT: 215 LBS | SYSTOLIC BLOOD PRESSURE: 163 MMHG | HEART RATE: 73 BPM | TEMPERATURE: 98.2 F | OXYGEN SATURATION: 96 % | HEIGHT: 69 IN | BODY MASS INDEX: 31.84 KG/M2

## 2023-02-01 PROCEDURE — 99214 OFFICE O/P EST MOD 30 MIN: CPT

## 2023-02-02 NOTE — CONSULT LETTER
[FreeTextEntry2] : Dr. Dre Buenrostro (pulm/ref) [FreeTextEntry3] : Jaime Francis MD\par Attending Surgeon\par Division of Thoracic Surgery\par , Cardiovascular and Thoracic Surgery\par [facility] [employer] [facility]

## 2023-02-02 NOTE — HISTORY OF PRESENT ILLNESS
[FreeTextEntry1] : Mr. SHWETA VELAZCO, 72 year old male, never smoker, w/ hx of arthritis, kidney stones, renal cyst, HLD, seasonal allergies - on allergy shots in past, CHEL on PAP therapy, Abnormal Chest CT w/ nodule (known for about 16 years) & Covid-19 infection 2020, referred by Dr. Dre Buenrostro (pulm) for enlarged LLL pulmonary nodule.\par \par CT Abd/Pelvis on 11/03/2022:\par - 1.1 cm left lower lobe pulmonary nodule is slightly increased in size compared with 2019 (9 mm) and with prior examinations dating back to 2009 (4 mm).\par \par CT chest on 01/03/2022: \par - 1 cm solid nodule in the left lower lobe has increased in size when compared to study of 7/22/2014. Exact etiology is unclear. \par \par PET/CT on 01/23/2023: \par - Known left lower lobe lung nodule is similar in size to most recent chest CT and exhibits no abnormal FDG uptake.\par - FDG avid right high inguinal lymph node is noted. Further correlation as clinically indicated. Differential includes benign inflammatory and malignant etiologies.\par - Prostate uptake is noted. Further correlation with dedicated pelvic imaging may be of additional value as clinically indicated.\par \par Patient is here today for a follow up.\par

## 2023-02-02 NOTE — ASSESSMENT
[FreeTextEntry1] : Mr. SHWETA VELAZCO, 72 year old male, never smoker, w/ hx of arthritis, kidney stones, renal cyst, HLD, seasonal allergies - on allergy shots in past, CHEL on PAP therapy, Abnormal Chest CT w/ nodule (known for about 16 years) & Covid-19 infection 2020, referred by Dr. Dre Buenrostro (pulm) for enlarged LLL pulmonary nodule.\par \par PET/CT reviewed and showed FDG avid right high inguinal lymph node is noted, likely benign, will repeat CT scan in the future to f/u. LLL nodule with no FDG avid, I feel this likely represents a low grade carcinoid tumor or Hamartoma, therefore, will order a a DOTATATE scan for further evaluation. I also ask patient to get PFTs and RTC after.  I have answered all of the patients questions and he understands the importance of follow up.\par \par

## 2023-02-20 ENCOUNTER — OUTPATIENT (OUTPATIENT)
Dept: OUTPATIENT SERVICES | Facility: HOSPITAL | Age: 73
LOS: 1 days | End: 2023-02-20
Payer: COMMERCIAL

## 2023-02-20 ENCOUNTER — APPOINTMENT (OUTPATIENT)
Dept: NUCLEAR MEDICINE | Facility: CLINIC | Age: 73
End: 2023-02-20
Payer: COMMERCIAL

## 2023-02-20 DIAGNOSIS — Z90.49 ACQUIRED ABSENCE OF OTHER SPECIFIED PARTS OF DIGESTIVE TRACT: Chronic | ICD-10-CM

## 2023-02-20 DIAGNOSIS — Z96.659 PRESENCE OF UNSPECIFIED ARTIFICIAL KNEE JOINT: Chronic | ICD-10-CM

## 2023-02-20 DIAGNOSIS — Z98.89 OTHER SPECIFIED POSTPROCEDURAL STATES: Chronic | ICD-10-CM

## 2023-02-20 DIAGNOSIS — R91.1 SOLITARY PULMONARY NODULE: ICD-10-CM

## 2023-02-20 DIAGNOSIS — Z98.890 OTHER SPECIFIED POSTPROCEDURAL STATES: Chronic | ICD-10-CM

## 2023-02-20 PROCEDURE — 78815 PET IMAGE W/CT SKULL-THIGH: CPT

## 2023-02-20 PROCEDURE — A9592: CPT

## 2023-02-20 PROCEDURE — 78815 PET IMAGE W/CT SKULL-THIGH: CPT | Mod: 26,PI

## 2023-03-02 ENCOUNTER — NON-APPOINTMENT (OUTPATIENT)
Age: 73
End: 2023-03-02

## 2023-03-15 ENCOUNTER — APPOINTMENT (OUTPATIENT)
Dept: THORACIC SURGERY | Facility: CLINIC | Age: 73
End: 2023-03-15
Payer: COMMERCIAL

## 2023-03-15 VITALS
HEART RATE: 67 BPM | DIASTOLIC BLOOD PRESSURE: 75 MMHG | SYSTOLIC BLOOD PRESSURE: 137 MMHG | BODY MASS INDEX: 32.58 KG/M2 | WEIGHT: 220 LBS | HEIGHT: 69 IN | OXYGEN SATURATION: 94 %

## 2023-03-15 PROCEDURE — 99214 OFFICE O/P EST MOD 30 MIN: CPT

## 2023-03-16 NOTE — ASSESSMENT
[FreeTextEntry1] : Mr. SHWETA VELAZCO, 72 year old male, never smoker who has had a slowly enlarging round nodule since 2009. PT with dotatate scan c/w with a carcinoid tumor. I have explained this to the patient and have recommended resection. The patient is very anxious and is reluctant to proceed. He is going to follow up with his pulmonologist to discuss it further and obtain PFTs. I have asked the patient to return to the office after he meets with his pulmonologist.  I have answered all of his questions and he understands the importance of follow up. \par \par \par

## 2023-03-16 NOTE — CONSULT LETTER
[FreeTextEntry2] : Dr. Dre Buenrostro (pulm/ref) [FreeTextEntry3] : Jaime Francis MD\par Attending Surgeon\par Division of Thoracic Surgery\par , Cardiovascular and Thoracic Surgery\par [facility]

## 2023-03-16 NOTE — PHYSICAL EXAM
[Sclera] : the sclera and conjunctiva were normal [Neck Appearance] : the appearance of the neck was normal [] : the neck was supple [Auscultation Breath Sounds / Voice Sounds] : lungs were clear to auscultation bilaterally [Heart Rate And Rhythm] : heart rate was normal and rhythm regular [Examination Of The Chest] : the chest was normal in appearance [Chest Visual Inspection Thoracic Asymmetry] : no chest asymmetry [Edema] : there was no peripheral edema [No CVA Tenderness] : no ~M costovertebral angle tenderness [No Spinal Tenderness] : no spinal tenderness [Nail Clubbing] : no clubbing  or cyanosis of the fingernails [Skin Color & Pigmentation] : normal skin color and pigmentation [Oriented To Time, Place, And Person] : oriented to person, place, and time

## 2023-03-16 NOTE — HISTORY OF PRESENT ILLNESS
[FreeTextEntry1] : Mr. SHWETA VELAZCO, 72 year old male, never smoker, w/ hx of arthritis, kidney stones, renal cyst, HLD, seasonal allergies - on allergy shots in past, CHEL on PAP therapy, Abnormal Chest CT w/ nodule (known for about 16 years) & Covid-19 infection 2020, referred by Dr. Dre Buenrostro (pulm) for enlarged LLL pulmonary nodule.\par \par CT Abd/Pelvis on 11/03/2022:\par - 1.1 cm left lower lobe pulmonary nodule is slightly increased in size compared with 2019 (9 mm) and with prior examinations dating back to 2009 (4 mm).\par \par CT chest on 01/03/2022: \par - 1 cm solid nodule in the left lower lobe has increased in size when compared to study of 7/22/2014. Exact etiology is unclear. \par \par PET/CT on 01/23/2023: \par - Known left lower lobe lung nodule is similar in size to most recent chest CT and exhibits no abnormal FDG uptake.\par - FDG avid right high inguinal lymph node is noted. Further correlation as clinically indicated. Differential includes benign inflammatory and malignant etiologies.\par - Prostate uptake is noted. 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 Nodule measured 1.1 x 0.9 cm on the diagnostic CT with differences in size likely related to technical factors.\par - Prostate gland measures 4.3 cm in transverse dimension with heterogeneous activity showing SUV 8.4 in the right lobe and 5.3 in the left lobe. Findings are nonspecific but should be correlated with PSA.\par \par Patient is here today for a follow up. pt feels well without complaints\par

## 2023-06-20 NOTE — ED ADULT NURSE NOTE - EXTENSIONS OF SELF_ADULT
----- Message from Beth Martinez sent at 6/20/2023  8:52 AM CDT -----  Type: Patient Call Back    Who called:pt     What is the request in detail:pt would like to discuss an appt he thought he had for 6/23/23. Please call pt     Can the clinic reply by [facility]?    Would the patient rather a call back or a response via [facility]? call    Best call back number:[phone] (home)       Additional Information:       clothing

## 2023-07-06 ENCOUNTER — NON-APPOINTMENT (OUTPATIENT)
Age: 73
End: 2023-07-06

## 2023-10-25 RX ORDER — VARDENAFIL HYDROCHLORIDE 20 MG/1
20 TABLET, FILM COATED ORAL
Qty: 6 | Refills: 11 | Status: DISCONTINUED | COMMUNITY
Start: 2017-03-02 | End: 2023-10-25

## 2023-11-01 ENCOUNTER — APPOINTMENT (OUTPATIENT)
Dept: UROLOGY | Facility: CLINIC | Age: 73
End: 2023-11-01
Payer: COMMERCIAL

## 2023-11-01 VITALS
SYSTOLIC BLOOD PRESSURE: 123 MMHG | RESPIRATION RATE: 14 BRPM | TEMPERATURE: 97.8 F | HEIGHT: 69 IN | WEIGHT: 225 LBS | BODY MASS INDEX: 33.33 KG/M2 | DIASTOLIC BLOOD PRESSURE: 69 MMHG | OXYGEN SATURATION: 95 % | HEART RATE: 68 BPM

## 2023-11-01 DIAGNOSIS — N52.9 MALE ERECTILE DYSFUNCTION, UNSPECIFIED: ICD-10-CM

## 2023-11-01 DIAGNOSIS — N40.0 BENIGN PROSTATIC HYPERPLASIA WITHOUT LOWER URINARY TRACT SYMPMS: ICD-10-CM

## 2023-11-01 DIAGNOSIS — N20.0 CALCULUS OF KIDNEY: ICD-10-CM

## 2023-11-01 PROCEDURE — 99214 OFFICE O/P EST MOD 30 MIN: CPT

## 2023-11-01 RX ORDER — TADALAFIL 5 MG/1
5 TABLET ORAL
Qty: 30 | Refills: 11 | Status: ACTIVE | COMMUNITY
Start: 2023-11-01 | End: 1900-01-01

## 2023-11-02 LAB
APPEARANCE: CLEAR
BACTERIA: NEGATIVE /HPF
BILIRUBIN URINE: NEGATIVE
BLOOD URINE: NEGATIVE
CAST: 0 /LPF
COLOR: YELLOW
EPITHELIAL CELLS: 1 /HPF
GLUCOSE QUALITATIVE U: NEGATIVE MG/DL
KETONES URINE: NEGATIVE MG/DL
LEUKOCYTE ESTERASE URINE: NEGATIVE
MICROSCOPIC-UA: NORMAL
NITRITE URINE: NEGATIVE
PH URINE: 6
PROTEIN URINE: NEGATIVE MG/DL
PSA SERPL-MCNC: 0.34 NG/ML
RED BLOOD CELLS URINE: 0 /HPF
SPECIFIC GRAVITY URINE: 1.02
UROBILINOGEN URINE: 1 MG/DL
WHITE BLOOD CELLS URINE: 0 /HPF

## 2023-11-06 ENCOUNTER — NON-APPOINTMENT (OUTPATIENT)
Age: 73
End: 2023-11-06

## 2023-11-06 LAB — URINE CYTOLOGY: NORMAL

## 2023-11-13 ENCOUNTER — APPOINTMENT (OUTPATIENT)
Dept: THORACIC SURGERY | Facility: CLINIC | Age: 73
End: 2023-11-13
Payer: COMMERCIAL

## 2023-11-13 PROCEDURE — 99443: CPT

## 2023-11-13 RX ORDER — ERTAPENEM SODIUM 1 G/1
1 INJECTION, POWDER, LYOPHILIZED, FOR SOLUTION INTRAMUSCULAR; INTRAVENOUS
Qty: 7 | Refills: 0 | Status: COMPLETED | COMMUNITY
Start: 2020-11-02 | End: 2023-11-13

## 2023-11-13 RX ORDER — GABAPENTIN 300 MG/1
300 CAPSULE ORAL
Refills: 0 | Status: ACTIVE | COMMUNITY

## 2024-01-04 PROBLEM — R91.1 LUNG NODULE: Status: ACTIVE | Noted: 2022-11-10

## 2024-01-08 ENCOUNTER — APPOINTMENT (OUTPATIENT)
Dept: THORACIC SURGERY | Facility: CLINIC | Age: 74
End: 2024-01-08
Payer: COMMERCIAL

## 2024-01-08 VITALS
HEART RATE: 70 BPM | WEIGHT: 215 LBS | DIASTOLIC BLOOD PRESSURE: 82 MMHG | SYSTOLIC BLOOD PRESSURE: 148 MMHG | HEIGHT: 69 IN | BODY MASS INDEX: 31.84 KG/M2 | RESPIRATION RATE: 16 BRPM | OXYGEN SATURATION: 95 %

## 2024-01-08 DIAGNOSIS — R91.1 SOLITARY PULMONARY NODULE: ICD-10-CM

## 2024-01-08 PROCEDURE — 99215 OFFICE O/P EST HI 40 MIN: CPT

## 2024-01-08 RX ORDER — ASPIRIN 81 MG
81 TABLET, DELAYED RELEASE (ENTERIC COATED) ORAL
Refills: 0 | Status: ACTIVE | COMMUNITY

## 2024-01-08 NOTE — HISTORY OF PRESENT ILLNESS
[FreeTextEntry1] : Mr. SHWETA VELAZCO, 73 year old male, never smoker, w/ hx of arthritis, kidney stones, renal cyst, HLD, seasonal allergies - on allergy shots in past, CHEL on PAP therapy, Abnormal Chest CT w/ nodule (known for about 16 years) & Covid-19 infection 2020, referred by Dr. Dre Buenrostro (pulm) for enlarged LLL pulmonary nodule.  CT Abd/Pelvis on 11/03/2022: - 1.1 cm left lower lobe pulmonary nodule is slightly increased in size compared with 2019 (9 mm) and with prior examinations dating back to 2009 (4 mm).  CT chest on 01/03/2022:  - 1 cm solid nodule in the left lower lobe has increased in size when compared to study of 7/22/2014. Exact etiology is unclear.   PET/CT on 01/23/2023:  - Known left lower lobe lung nodule is similar in size to most recent chest CT and exhibits no abnormal FDG uptake. - FDG avid right high inguinal lymph node is noted. Further correlation as clinically indicated. Differential includes benign inflammatory and malignant etiologies. - Prostate uptake is noted. Further correlation with dedicated pelvic imaging may be of additional value as clinically indicated.  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 Nodule measured 1.1 x 0.9 cm on the diagnostic CT with differences in size likely related to technical factors. - Prostate gland measures 4.3 cm in transverse dimension with heterogeneous activity showing SUV 8.4 in the right lobe and 5.3 in the left lobe. Findings are nonspecific but should be correlated with PSA.  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 I have explained this to the patient and have recommended resection. The patient is very anxious and is reluctant to proceed. He is going to follow up with his pulmonologist to discuss it further and obtain PFTs. I have asked the patient to return to the office after he meets with his pulmonologist. Patient did not f/u with me.   PFTs on 10/11/2023: FEV1 2.08 (74%), DLCO 115%.   CT chest on 10/31/2023: ([facility]) - Left lower lobe solid nodule: Measures 1.3 cm x 1.0 cm (series 10, image 477), previously this measured 1.2 cm x 1.1 cm (series 8, image 100). On CT abdomen pelvis 10/9/2020 this measured 1.1 cm x 1.0 cm (series 3, image 7). - Severe coronary artery calcifications. - Possible mild reflux esophagitis. Correlation with symptomatology is recommended. If clinically warranted, endoscopy could be performed to further evaluate.  On 11/13/2023, CT chest reviewed and explained to patient, stable LLL nodule, likely a carcinoid tumor, recommended surgical resection, will schedule Left VATS, LLLobectomy on 01/25/2024.   Patient is here today for a follow up.

## 2024-01-08 NOTE — REASON FOR VISIT
[Follow-Up: _____] : a [unfilled] follow-up visit [Home] : at home, [unfilled] , at the time of the visit. [Medical Office: ([facility])___] : at the medical office located in  [This encounter was initiated by telehealth (audio with video) and converted to telephone (audio only) due to technical difficulties.] : This encounter was initiated by telehealth (audio with video) and converted to telephone (audio only) due to technical difficulties. [Family Member] : family member

## 2024-01-08 NOTE — CONSULT LETTER
[Dear  ___] : Dear  [unfilled], [Consult Letter:] : I had the pleasure of evaluating your patient, [unfilled]. [( Thank you for referring [unfilled] for consultation for _____ )] : Thank you for referring [unfilled] for consultation for [unfilled] [Please see my note below.] : Please see my note below. [Consult Closing:] : Thank you very much for allowing me to participate in the care of this patient.  If you have any questions, please do not hesitate to contact me. [Sincerely,] : Sincerely, [FreeTextEntry2] : Dr. Dre Buenrostro (pulm/ref) [FreeTextEntry3] : Jaime Francis MD\par  Attending Surgeon\par  Division of Thoracic Surgery\par  , Cardiovascular and Thoracic Surgery\par  [facility]

## 2024-01-08 NOTE — ASSESSMENT
[FreeTextEntry1] : Mr. SHWETA VELAZCO, 73 year old male, never smoker, w/ hx of arthritis, kidney stones, renal cyst, HLD, seasonal allergies - on allergy shots in past, CHEL on PAP therapy, Abnormal Chest CT w/ nodule (known for about 16 years) & Covid-19 infection 2020, referred by Dr. Dre Buenrostro (pulm) for enlarged LLL pulmonary nodule.  CT Abd/Pelvis on 11/03/2022: - 1.1 cm left lower lobe pulmonary nodule is slightly increased in size compared with 2019 (9 mm) and with prior examinations dating back to 2009 (4 mm).  CT chest on 01/03/2022:  - 1 cm solid nodule in the left lower lobe has increased in size when compared to study of 7/22/2014. Exact etiology is unclear.   PET/CT on 01/23/2023:  - Known left lower lobe lung nodule is similar in size to most recent chest CT and exhibits no abnormal FDG uptake. - FDG avid right high inguinal lymph node is noted. Further correlation as clinically indicated. Differential includes benign inflammatory and malignant etiologies. - Prostate uptake is noted. Further correlation with dedicated pelvic imaging may be of additional value as clinically indicated.  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 Nodule measured 1.1 x 0.9 cm on the diagnostic CT with differences in size likely related to technical factors. - Prostate gland measures 4.3 cm in transverse dimension with heterogeneous activity showing SUV 8.4 in the right lobe and 5.3 in the left lobe. Findings are nonspecific but should be correlated with PSA.  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 I have explained this to the patient and have recommended resection. The patient is very anxious and is reluctant to proceed. He is going to follow up with his pulmonologist to discuss it further and obtain PFTs. I have asked the patient to return to the office after he meets with his pulmonologist. Patient did not f/u with me.   PFTs on 10/11/2023: FEV1 2.08 (74%), DLCO 115%.   CT chest on 10/31/2023: ([facility]) - Left lower lobe solid nodule: Measures 1.3 cm x 1.0 cm (series 10, image 477), previously this measured 1.2 cm x 1.1 cm (series 8, image 100). On CT abdomen pelvis 10/9/2020 this measured 1.1 cm x 1.0 cm (series 3, image 7). - Severe coronary artery calcifications. - Possible mild reflux esophagitis. Correlation with symptomatology is recommended. If clinically warranted, endoscopy could be performed to further evaluate.  I have reviewed the patient's medical records and diagnostic images at time of this office consultation and have made the following recommendation: 1. CT reviewed and again discussed possible likely a carcinoid tumor, I recommended surgical resection. Discussed with pt risks of delay in treatment and possible spreading of the disease. Pt is still hesitant about surgery and would continue with surveillance scan. RTC in March 2023 with repeat CT Chest    I, BUDDY Alvarado, personally performed the evaluation and management (E/M) services for this established patient who presents today with (a) new problem(s)/exacerbation of (an) existing condition(s).  That E/M includes conducting the examination, assessing all new/exacerbated conditions, and establishing a new plan of care.  Today, my ACP, SENAIT Carr was here to observe my evaluation and management services for this new problem/exacerbated condition to be followed going forward.

## 2024-01-24 RX ORDER — POTASSIUM CITRATE 10 MEQ/1
10 MEQ TABLET, EXTENDED RELEASE ORAL
Qty: 180 | Refills: 2 | Status: ACTIVE | COMMUNITY
Start: 2018-09-28 | End: 1900-01-01

## 2024-01-24 RX ORDER — HYDROCHLOROTHIAZIDE 25 MG/1
25 TABLET ORAL DAILY
Qty: 90 | Refills: 2 | Status: ACTIVE | COMMUNITY
Start: 2017-12-20 | End: 1900-01-01

## 2024-01-24 RX ORDER — ALLOPURINOL 100 MG/1
100 TABLET ORAL DAILY
Qty: 90 | Refills: 2 | Status: ACTIVE | COMMUNITY
Start: 2017-12-20 | End: 1900-01-01

## 2024-01-25 ENCOUNTER — APPOINTMENT (OUTPATIENT)
Dept: THORACIC SURGERY | Facility: HOSPITAL | Age: 74
End: 2024-01-25

## 2024-01-29 ENCOUNTER — APPOINTMENT (OUTPATIENT)
Dept: ORTHOPEDIC SURGERY | Facility: CLINIC | Age: 74
End: 2024-01-29
Payer: COMMERCIAL

## 2024-01-29 VITALS
HEART RATE: 59 BPM | WEIGHT: 215 LBS | HEIGHT: 69 IN | BODY MASS INDEX: 31.84 KG/M2 | DIASTOLIC BLOOD PRESSURE: 80 MMHG | SYSTOLIC BLOOD PRESSURE: 128 MMHG

## 2024-01-29 PROCEDURE — 99203 OFFICE O/P NEW LOW 30 MIN: CPT

## 2024-01-29 PROCEDURE — 73560 X-RAY EXAM OF KNEE 1 OR 2: CPT | Mod: RT

## 2024-01-31 NOTE — CONSULT LETTER
[Dear  ___] : Dear  [unfilled], [Consult Letter:] : I had the pleasure of evaluating your patient, [unfilled]. [Please see my note below.] : Please see my note below. [Consult Closing:] : Thank you very much for allowing me to participate in the care of this patient.  If you have any questions, please do not hesitate to contact me. [Sincerely,] : Sincerely, [FreeTextEntry3] : Joon Ojeda, III, MD JARRELL/jose maria   [DrHeather  ___] : Dr. GARZA

## 2024-01-31 NOTE — PHYSICAL EXAM
[de-identified] : Left Knee: Range of Motion in Degrees                                    Claimant:    Normal:  Flexion Active             135          135-degrees  Flexion Passive          135          135-degrees  Extension Active         0-5           0-5-degrees  Extension Passive     0-5            0-5-degrees    No weakness to flexion/extension.  No evidence of instability in the AP plane or varus or valgus stress.  Negative Lachman.  Negative pivot shift.  Negative anterior drawer test.  Negative posterior drawer test.  Negative Saida.  Negative Apley grind.  No medial or lateral joint line tenderness.  No tenderness over the medial and lateral facet of the patella.  No patellofemoral crepitations.  No lateral tilting patella.  No patellar apprehension.  No crepitation in the medial and lateral femoral condyle.  No proximal or distal swelling, edema or tenderness.  No gross motor or sensory deficits.  No intra-articular swelling.  2+ DP and PT pulses. No varus or valgus malalignment.  Skin is intact.  No rashes, scars or lesions.    Right Knee: Range of Motion in Degrees                                   Claimant:    Normal:  Flexion Active               95         135-degrees  Flexion Passive            95         135-degrees  Extension Active             0         0-5-degrees  Extension Passive          0         0-5-degrees    He has significant laxity to valgus stress.  Moderate varus deformity.  [de-identified] : Gait and Station:  Ambulating with a slightly antalgic to antalgic gait.  Normal Station.  [de-identified] : Appearance:  Well-developed, well-nourished male in no acute distress.   [de-identified] : Radiographs, two views of the right knee taken in the office today, show collapse of the knee with significant implant loosening of his total knee.

## 2024-01-31 NOTE — HISTORY OF PRESENT ILLNESS
[de-identified] : The patient comes in today for his right knee (he has not been seen in about 9 years).  He states over the last year, he has developed increasing complaints of pain to his right knee.  This injury is not work related or due to an automobile accident.  The patient describes the pain as throbbing.

## 2024-01-31 NOTE — DISCUSSION/SUMMARY
[de-identified] : At this time, due to loose right total knee arthroplasty, I recommended collateral hinged bracing.  I also recommended a CBC, ESR and CRP.  He is being referred to Dr. Dacosta for revision surgery.  The patient was prescribed a rigid support Playmaker knee brace with range of motion joints.  The brace will safely protect the patient and help to facilitate healing by stabilizing and controlling the knee.  In order to prevent skin breakdown along bony prominences and to avoid compression of the peroneal nerve, a custom fit is necessary.

## 2024-01-31 NOTE — ADDENDUM
[FreeTextEntry1] : This note was written by Blake Garduno on 01/31/2024, acting as a scribe for Joon Ojeda III, MD

## 2024-02-02 ENCOUNTER — APPOINTMENT (OUTPATIENT)
Dept: ORTHOPEDIC SURGERY | Facility: CLINIC | Age: 74
End: 2024-02-02
Payer: OTHER MISCELLANEOUS

## 2024-02-02 VITALS
DIASTOLIC BLOOD PRESSURE: 80 MMHG | HEIGHT: 69 IN | HEART RATE: 87 BPM | WEIGHT: 215 LBS | SYSTOLIC BLOOD PRESSURE: 120 MMHG | BODY MASS INDEX: 31.84 KG/M2

## 2024-02-02 PROCEDURE — 99215 OFFICE O/P EST HI 40 MIN: CPT

## 2024-02-02 RX ORDER — SILDENAFIL 20 MG/1
20 TABLET ORAL
Qty: 90 | Refills: 3 | Status: ACTIVE | COMMUNITY
Start: 2019-12-19 | End: 1900-01-01

## 2024-02-02 NOTE — HISTORY OF PRESENT ILLNESS
[de-identified] : 2/2/24: Patient presents with right knee pain for the last few months.  States previous to this his knee was good.  Was replaced by Dr. Ojeda on 4/2015 as a Worker's Comp. case.  States pain is mostly in the medial aspect of his knee.  Referred by Dr. Ojeda for revision.  Denies rating pain numbness or tingling.  Using a knee brace at this time.  Takes gabapentin for his back.  Denies any history of infections.  Allergies-Oxy-nausea/headache, denies anticoagulation, denies tobacco use, PMH-lumbar spine issues, left lung nodule that was reportedly recommended to have removed.  CHEL, states he had issues with anesthesia when he had his gallbladder taken out 5 years ago at outside, states he needs his breathing machine for after surgery.  States he would like to meet with anesthesia prior to surgery.

## 2024-02-02 NOTE — DISCUSSION/SUMMARY
[de-identified] : Loosening of right prosthetic knee  Extensive conversation about this issue with the patient.  Discussed we must rule out infection first.  He did have blood work drawn yesterday but the results not yet available, this was at [facility] labs.  Also discussed he may benefit from an aspiration of the knee to ensure there is no infections.  We will wait for insurance to be worked out whether this is a Worker's Comp. case.  Discussed what a revision knee entails including increased risk of infection, nerve damage, and other issues compared to her primary knee.  Patient understands this.  He is going to return with his wife to further discuss surgery.  All questions answered.

## 2024-02-02 NOTE — PHYSICAL EXAM
[de-identified] : Right knee Well-healed surgical scar, no signs of infection 0-1 30 range of motion Significant laxity with varus and valgus stress [de-identified] : 2/2/24: Prior x-rays taken at Dr. Ojeda's office reviewed-gross loosening of components

## 2024-03-01 ENCOUNTER — OUTPATIENT (OUTPATIENT)
Dept: OUTPATIENT SERVICES | Facility: HOSPITAL | Age: 74
LOS: 1 days | End: 2024-03-01
Payer: COMMERCIAL

## 2024-03-01 ENCOUNTER — APPOINTMENT (OUTPATIENT)
Dept: CT IMAGING | Facility: CLINIC | Age: 74
End: 2024-03-01
Payer: COMMERCIAL

## 2024-03-01 DIAGNOSIS — Z98.89 OTHER SPECIFIED POSTPROCEDURAL STATES: Chronic | ICD-10-CM

## 2024-03-01 DIAGNOSIS — R91.1 SOLITARY PULMONARY NODULE: ICD-10-CM

## 2024-03-01 DIAGNOSIS — Z96.659 PRESENCE OF UNSPECIFIED ARTIFICIAL KNEE JOINT: Chronic | ICD-10-CM

## 2024-03-01 DIAGNOSIS — Z90.49 ACQUIRED ABSENCE OF OTHER SPECIFIED PARTS OF DIGESTIVE TRACT: Chronic | ICD-10-CM

## 2024-03-01 DIAGNOSIS — Z98.890 OTHER SPECIFIED POSTPROCEDURAL STATES: Chronic | ICD-10-CM

## 2024-03-01 PROCEDURE — 71250 CT THORAX DX C-: CPT

## 2024-03-01 PROCEDURE — 71250 CT THORAX DX C-: CPT | Mod: 26

## 2024-03-06 ENCOUNTER — APPOINTMENT (OUTPATIENT)
Dept: THORACIC SURGERY | Facility: CLINIC | Age: 74
End: 2024-03-06
Payer: COMMERCIAL

## 2024-03-06 VITALS
HEART RATE: 67 BPM | SYSTOLIC BLOOD PRESSURE: 128 MMHG | BODY MASS INDEX: 32.58 KG/M2 | WEIGHT: 220 LBS | DIASTOLIC BLOOD PRESSURE: 73 MMHG | HEIGHT: 69 IN | OXYGEN SATURATION: 95 %

## 2024-03-06 DIAGNOSIS — R91.8 OTHER NONSPECIFIC ABNORMAL FINDING OF LUNG FIELD: ICD-10-CM

## 2024-03-06 PROCEDURE — 99214 OFFICE O/P EST MOD 30 MIN: CPT

## 2024-03-06 NOTE — PHYSICAL EXAM
[] : no respiratory distress [Heart Rate And Rhythm] : heart rate was normal and rhythm regular [Auscultation Breath Sounds / Voice Sounds] : lungs were clear to auscultation bilaterally [Heart Sounds Gallop] : no gallops [Heart Sounds] : normal S1 and S2 [Murmurs] : no murmurs [Heart Sounds Pericardial Friction Rub] : no pericardial rub [Examination Of The Chest] : the chest was normal in appearance [Diminished Respiratory Excursion] : normal chest expansion [Chest Visual Inspection Thoracic Asymmetry] : no chest asymmetry [2+] : right 2+ [Cervical Lymph Nodes Enlarged Posterior Bilaterally] : posterior cervical [Cervical Lymph Nodes Enlarged Anterior Bilaterally] : anterior cervical [Supraclavicular Lymph Nodes Enlarged Bilaterally] : supraclavicular

## 2024-03-06 NOTE — ASSESSMENT
[FreeTextEntry1] : Mr. SHWETA VELAZCO, 73 year old male, never smoker, w/ hx of arthritis, kidney stones, renal cyst, HLD, seasonal allergies - on allergy shots in past, CHEL on PAP therapy, Abnormal Chest CT w/ nodule (known for about 16 years) & Covid-19 infection 2020, referred by Dr. Dre Buenrostro (pulm) for enlarged LLL pulmonary nodule. Patient was initially consulted on 12/07/2022.   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 I have explained this to the patient and have recommended resection. The patient is very anxious and is reluctant to proceed. He is going to follow up with his pulmonologist to discuss it further and obtain PFTs. I have asked the patient to return to the office after he meets with his pulmonologist. Patient did not f/u with me.   On 11/13/2023, CT chest reviewed and explained to patient, stable LLL nodule, likely a carcinoid tumor, recommended surgical resection, will schedule Left VATS, LLL Lobectomy on 01/25/2024.   On 01/18/2024, CT reviewed and again discussed possible likely a carcinoid tumor, I recommended surgical resection. Discussed with pt risks of delay in treatment and possible spreading of the disease. Pt is still hesitant about surgery and would continue with surveillance scan. RTC in March 2024 with repeat CT Chest   Most recent CT of the chest shows a stable nodule from prior scan but larger from more remote scans. Pt wishes to continue surveillance. Once again, I explained that this is likely a carcinoid and does have a small chance of metastasis in the future. He understands and wishes surveillance. I have asked the patient to obtain a repeat CT of the chest in 6 months and to follow up with me in my office at that time. I have answered all of his questions, and he understands the importance of follow up.   I, Dr. Jaime Francis personally performed the evaluation and management (E/M) services for this established patient. That E/M includes conducting the examination, assessing all new/exacerbated conditions, and establishing a new plan of care. Today, My ACP, Alana Villalpando, was here to observe my evaluation and management services for this patient to be followed going forward.

## 2024-03-06 NOTE — CONSULT LETTER
[Dear  ___] : Dear  [unfilled], [Consult Letter:] : I had the pleasure of evaluating your patient, [unfilled]. [( Thank you for referring [unfilled] for consultation for _____ )] : Thank you for referring [unfilled] for consultation for [unfilled] [Please see my note below.] : Please see my note below. [Consult Closing:] : Thank you very much for allowing me to participate in the care of this patient.  If you have any questions, please do not hesitate to contact me. [Sincerely,] : Sincerely, [FreeTextEntry2] : Dr. Dre Buenrostro (pulm/ref) [FreeTextEntry3] : Jaime Francis MD\par  Attending Surgeon\par  Division of Thoracic Surgery\par  , Cardiovascular and Thoracic Surgery\par  [facility] [employer] [facility]

## 2024-03-06 NOTE — HISTORY OF PRESENT ILLNESS
[FreeTextEntry1] : Mr. SHWETA VELAZCO, 73 year old male, never smoker, w/ hx of arthritis, kidney stones, renal cyst, HLD, seasonal allergies - on allergy shots in past, CHEL on PAP therapy, Abnormal Chest CT w/ nodule (known for about 16 years) & Covid-19 infection 2020, referred by Dr. Dre Buenrostro (pulm) for enlarged LLL pulmonary nodule. Patient was initially consulted on 12/07/2022.   CT Abd/Pelvis on 11/03/2022: - 1.1 cm left lower lobe pulmonary nodule is slightly increased in size compared with 2019 (9 mm) and with prior examinations dating back to 2009 (4 mm).  CT chest on 01/03/2022:  - 1 cm solid nodule in the left lower lobe has increased in size when compared to study of 7/22/2014. Exact etiology is unclear.   PET/CT on 01/23/2023:  - Known left lower lobe lung nodule is similar in size to most recent chest CT and exhibits no abnormal FDG uptake. - FDG avid right high inguinal lymph node is noted. Further correlation as clinically indicated. Differential includes benign inflammatory and malignant etiologies. - Prostate uptake is noted. Further correlation with dedicated pelvic imaging may be of additional value as clinically indicated.  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 Nodule measured 1.1 x 0.9 cm on the diagnostic CT with differences in size likely related to technical factors. - Prostate gland measures 4.3 cm in transverse dimension with heterogeneous activity showing SUV 8.4 in the right lobe and 5.3 in the left lobe. Findings are nonspecific but should be correlated with PSA.  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 I have explained this to the patient and have recommended resection. The patient is very anxious and is reluctant to proceed. He is going to follow up with his pulmonologist to discuss it further and obtain PFTs. I have asked the patient to return to the office after he meets with his pulmonologist. Patient did not f/u with me.   PFTs on 10/11/2023: FEV1 2.08 (74%), DLCO 115%.   [facility] - Left lower lobe solid nodule: Measures 1.3 cm x 1.0 cm (series 10, image 477), previously this measured 1.2 cm x 1.1 cm (series 8, image 100). On CT abdomen pelvis 10/9/2020 this measured 1.1 cm x 1.0 cm (series 3, image 7). - Severe coronary artery calcifications. - Possible mild reflux esophagitis. Correlation with symptomatology is recommended. If clinically warranted, endoscopy could be performed to further evaluate.  On 11/13/2023, CT chest reviewed and explained to patient, stable LLL nodule, likely a carcinoid tumor, recommended surgical resection, will schedule Left VATS, LLLobectomy on 01/25/2024.   On 01/18/2024, CT reviewed and again discussed possible likely a carcinoid tumor, I recommended surgical resection. Discussed with pt risks of delay in treatment and possible spreading of the disease. Pt is still hesitant about surgery and would continue with surveillance scan. RTC in March 2024 with repeat CT Chest   CT chest on 03/01/2024: -  Mild interval enlargement of the solid 1.2 x 1.0 cm anterior LLL nodule (series 2, image 71), previously measuring 1.0 x 0.9 cm in January 2023 and 6 mm in 2013 - Coronary artery calcification. - Mildly increased pneumobilia since February 2023 PET/CT; Cholecystectomy.  Patient is here today for a follow up. Patient is doing well, denies any SOB, CP, cough or hemoptysis.

## 2024-03-08 ENCOUNTER — APPOINTMENT (OUTPATIENT)
Dept: ORTHOPEDIC SURGERY | Facility: CLINIC | Age: 74
End: 2024-03-08
Payer: OTHER MISCELLANEOUS

## 2024-03-08 PROCEDURE — 99215 OFFICE O/P EST HI 40 MIN: CPT | Mod: 25

## 2024-03-08 PROCEDURE — 20610 DRAIN/INJ JOINT/BURSA W/O US: CPT | Mod: RT

## 2024-03-08 NOTE — PHYSICAL EXAM
[de-identified] : Right knee Large effusion Well-healed surgical scar, no signs of infection 0-130 range of motion Significant laxity with varus and valgus stress [de-identified] : 2/2/24: Prior x-rays taken at Dr. Ojeda's office reviewed-gross loosening of components

## 2024-03-08 NOTE — HISTORY OF PRESENT ILLNESS
[de-identified] : 3/8/24: Patient here for follow-up right knee pain.  States that he is significantly limiting his daily activities and he cannot stand living like this.  Would like to move forward with revision surgery.  2/2/24: Patient presents with right knee pain for the last few months.  States previous to this his knee was good.  Was replaced by Dr. Ojeda on 4/2015 as a Worker's Comp. case.  States pain is mostly in the medial aspect of his knee.  Referred by Dr. Ojeda for revision.  Denies rating pain numbness or tingling.  Using a knee brace at this time.  Takes gabapentin for his back.  Denies any history of infections.  Allergies-Oxy-nausea/headache, denies anticoagulation, denies tobacco use, PMH-lumbar spine issues, left lung nodule that was reportedly recommended to have removed.  CHEL, states he had issues with anesthesia when he had his gallbladder taken out 5 years ago at outside, states he needs his breathing machine for after surgery.  States he would like to meet with anesthesia prior to surgery.

## 2024-03-08 NOTE — DISCUSSION/SUMMARY
[de-identified] : Loosening of right prosthetic knee  Extensive conversation about this issue with the patient.  Discussed we must rule out infection first.  He did have blood work drawn while he still not gotten the results.  Due to the large effusion and significant collapse of the bone I believe aspiration is indicated at this point.  This was sent for cell count, cultures, crystals, and next generation sequencing.  Patient is going to reopen the Worker's Comp. case.  As this is a catastrophic failure of the knee replacement done for Worker's Comp. case this is directly related to his prior case.  Risk-benefit alternatives to revision knee surgery were discussed in depth with patient and wife.  Despite these risks he would like to proceed with surgical intervention.  Patient will need cardiac, pulmonary, medical clearance.  He is going to contact his  to see how quickly we can get approval from [employer]'s Comp.  I discussed we will contact the operating room once we know the status of the claim to try to get an extra day to add him in.  Will follow-up on the lab results and I will let him know if there is any signs of infection in the aspiration results.  He is going to send us a copy of the lab work that he already had from the blood draw.

## 2024-03-08 NOTE — PROCEDURE
[de-identified] : 3/8/24:: Right knee Aspiration The risks, benefits, and alternatives of the procedure were reviewed/discussed with the patient today in office and all of their questions were answered. The patient consented to the procedure. The lateral aspiration site was prepped with chloroprep.  Utilizing sterile technique, 35 cc of straw-colored synovial fluid was aspirated. A sterile bandage was applied. The patient tolerated the procedure well and there were no complications.

## 2024-03-12 ENCOUNTER — NON-APPOINTMENT (OUTPATIENT)
Age: 74
End: 2024-03-12

## 2024-03-12 LAB
B PERT IGG+IGM PNL SER: ABNORMAL
COLOR FLD: YELLOW
EOSINOPHIL # FLD MANUAL: 0 %
FLUID INTAKE SUBSTANCE CLASS: NORMAL
LYMPHOCYTES # FLD MANUAL: 17 %
MESOTHL CELL NFR FLD: 0 %
MONOS+MACROS NFR FLD MANUAL: 72 %
NEUTS SEG # FLD MANUAL: 11 %
NRBC # FLD: 0 %
RBC # FLD MANUAL: ABNORMAL /UL
SYCRY CLARITY: ABNORMAL
SYCRY COLOR: YELLOW
SYCRY ID: ABNORMAL
SYCRY TUBE: NORMAL
TOTAL CELLS COUNTED FLD: 1774 /UL
TUBE TYPE: NORMAL
UNIDENT CELLS NFR FLD MANUAL: 0 %
VARIANT LYMPHS # FLD MANUAL: 0 %

## 2024-03-14 NOTE — PATIENT PROFILE ADULT - NSPROSPIRITUALVALUESFT_GEN_A_NUR
The Delivery OB Provider certifies that vaginal examination and/or abdominal examination after the delivery was done and no foreign body was found.
NONE

## 2024-03-25 LAB — JOINT CULTURE: NORMAL

## 2024-05-21 ENCOUNTER — APPOINTMENT (OUTPATIENT)
Dept: ORTHOPEDIC SURGERY | Facility: CLINIC | Age: 74
End: 2024-05-21
Payer: OTHER MISCELLANEOUS

## 2024-05-21 PROCEDURE — 99455 WORK RELATED DISABILITY EXAM: CPT

## 2024-05-21 PROCEDURE — 73522 X-RAY EXAM HIPS BI 3-4 VIEWS: CPT | Mod: RT

## 2024-05-21 NOTE — HISTORY OF PRESENT ILLNESS
[de-identified] : 5/21/24: Patient here for follow-up right knee loosening.  He wanted to discuss surgery further.  Also he is planning on going up for horse races this summer so is wondering if he be able to do that.  3/8/24: Patient here for follow-up right knee pain.  States that he is significantly limiting his daily activities and he cannot stand living like this.  Would like to move forward with revision surgery.  2/2/24: Patient presents with right knee pain for the last few months.  States previous to this his knee was good.  Was replaced by Dr. Ojeda on 4/2015 as a Worker's Comp. case.  States pain is mostly in the medial aspect of his knee.  Referred by Dr. Ojeda for revision.  Denies rating pain numbness or tingling.  Using a knee brace at this time.  Takes gabapentin for his back.  Denies any history of infections.  Allergies-Oxy-nausea/headache, denies anticoagulation, denies tobacco use, PMH-lumbar spine issues, left lung nodule that was reportedly recommended to have removed.  CHEL, states he had issues with anesthesia when he had his gallbladder taken out 5 years ago at outside, states he needs his breathing machine for after surgery.  States he would like to meet with anesthesia prior to surgery.

## 2024-05-21 NOTE — DISCUSSION/SUMMARY
[de-identified] : Loosening of right prosthetic knee  Extensive conversation about this issue with the patient.   As this is a catastrophic failure of the knee replacement done for Worker's Comp. case this is directly related to his prior case.  Risk-benefit alternatives to revision knee surgery were discussed in depth with patient and wife previously.  Despite these risks he would like to proceed with surgical intervention.  We discussed I do not recommend holding off until the end of the summer as a component is already wearing through his cortex and this could result in fracture.  We did discuss there is risk of infection that is higher in revision surgery, 5%.  Although I do believe patient will be able to travel during the summer we discussed that once recovery is different and he may not feel ready for this yet.  I will discuss if there is any complications this will further delay things.  However I believe waiting poses significant risk.  Patient will need cardiac, pulmonary, medical clearance.

## 2024-05-21 NOTE — PHYSICAL EXAM
[de-identified] : Right knee Large effusion Well-healed surgical scar, no signs of infection 0-130 range of motion Significant laxity with varus and valgus stress [de-identified] : 5/21/24: 3 views right knee-gross loosening of femoral and tibial components with erosion through the posterior cortex of the tibia 2/2/24: Prior x-rays taken at Dr. Ojeda's office reviewed-gross loosening of components

## 2024-05-24 ENCOUNTER — NON-APPOINTMENT (OUTPATIENT)
Age: 74
End: 2024-05-24

## 2024-05-29 ENCOUNTER — OUTPATIENT (OUTPATIENT)
Dept: OUTPATIENT SERVICES | Facility: HOSPITAL | Age: 74
LOS: 1 days | End: 2024-05-29
Payer: OTHER MISCELLANEOUS

## 2024-05-29 VITALS
DIASTOLIC BLOOD PRESSURE: 67 MMHG | HEART RATE: 62 BPM | OXYGEN SATURATION: 95 % | RESPIRATION RATE: 16 BRPM | WEIGHT: 251.11 LBS | SYSTOLIC BLOOD PRESSURE: 107 MMHG | HEIGHT: 69 IN | TEMPERATURE: 98 F

## 2024-05-29 DIAGNOSIS — Z96.652 PRESENCE OF LEFT ARTIFICIAL KNEE JOINT: Chronic | ICD-10-CM

## 2024-05-29 DIAGNOSIS — Z96.651 PRESENCE OF RIGHT ARTIFICIAL KNEE JOINT: Chronic | ICD-10-CM

## 2024-05-29 DIAGNOSIS — Z98.890 OTHER SPECIFIED POSTPROCEDURAL STATES: Chronic | ICD-10-CM

## 2024-05-29 DIAGNOSIS — Z90.49 ACQUIRED ABSENCE OF OTHER SPECIFIED PARTS OF DIGESTIVE TRACT: Chronic | ICD-10-CM

## 2024-05-29 DIAGNOSIS — Z01.818 ENCOUNTER FOR OTHER PREPROCEDURAL EXAMINATION: ICD-10-CM

## 2024-05-29 DIAGNOSIS — Z98.89 OTHER SPECIFIED POSTPROCEDURAL STATES: Chronic | ICD-10-CM

## 2024-05-29 DIAGNOSIS — Z96.659 PRESENCE OF UNSPECIFIED ARTIFICIAL KNEE JOINT: Chronic | ICD-10-CM

## 2024-05-29 LAB
A1C WITH ESTIMATED AVERAGE GLUCOSE RESULT: 6 % — HIGH (ref 4–5.6)
ALBUMIN SERPL ELPH-MCNC: 3.8 G/DL — SIGNIFICANT CHANGE UP (ref 3.3–5)
ALP SERPL-CCNC: 61 U/L — SIGNIFICANT CHANGE UP (ref 40–120)
ALT FLD-CCNC: 32 U/L — SIGNIFICANT CHANGE UP (ref 12–78)
ANION GAP SERPL CALC-SCNC: 4 MMOL/L — LOW (ref 5–17)
APTT BLD: 33.4 SEC — SIGNIFICANT CHANGE UP (ref 24.5–35.6)
AST SERPL-CCNC: 23 U/L — SIGNIFICANT CHANGE UP (ref 15–37)
BASOPHILS # BLD AUTO: 0.04 K/UL — SIGNIFICANT CHANGE UP (ref 0–0.2)
BASOPHILS NFR BLD AUTO: 0.5 % — SIGNIFICANT CHANGE UP (ref 0–2)
BILIRUB SERPL-MCNC: 2 MG/DL — HIGH (ref 0.2–1.2)
BUN SERPL-MCNC: 25 MG/DL — HIGH (ref 7–23)
CALCIUM SERPL-MCNC: 9.4 MG/DL — SIGNIFICANT CHANGE UP (ref 8.5–10.1)
CHLORIDE SERPL-SCNC: 106 MMOL/L — SIGNIFICANT CHANGE UP (ref 96–108)
CO2 SERPL-SCNC: 28 MMOL/L — SIGNIFICANT CHANGE UP (ref 22–31)
CREAT SERPL-MCNC: 1.14 MG/DL — SIGNIFICANT CHANGE UP (ref 0.5–1.3)
EGFR: 67 ML/MIN/1.73M2 — SIGNIFICANT CHANGE UP
EOSINOPHIL # BLD AUTO: 0.3 K/UL — SIGNIFICANT CHANGE UP (ref 0–0.5)
EOSINOPHIL NFR BLD AUTO: 3.9 % — SIGNIFICANT CHANGE UP (ref 0–6)
ESTIMATED AVERAGE GLUCOSE: 126 MG/DL — HIGH (ref 68–114)
GLUCOSE SERPL-MCNC: 121 MG/DL — HIGH (ref 70–99)
HCT VFR BLD CALC: 42.8 % — SIGNIFICANT CHANGE UP (ref 39–50)
HGB BLD-MCNC: 14.7 G/DL — SIGNIFICANT CHANGE UP (ref 13–17)
IMM GRANULOCYTES NFR BLD AUTO: 0.4 % — SIGNIFICANT CHANGE UP (ref 0–0.9)
INR BLD: 0.99 RATIO — SIGNIFICANT CHANGE UP (ref 0.85–1.18)
LYMPHOCYTES # BLD AUTO: 2.27 K/UL — SIGNIFICANT CHANGE UP (ref 1–3.3)
LYMPHOCYTES # BLD AUTO: 29.8 % — SIGNIFICANT CHANGE UP (ref 13–44)
MCHC RBC-ENTMCNC: 30.9 PG — SIGNIFICANT CHANGE UP (ref 27–34)
MCHC RBC-ENTMCNC: 34.3 GM/DL — SIGNIFICANT CHANGE UP (ref 32–36)
MCV RBC AUTO: 89.9 FL — SIGNIFICANT CHANGE UP (ref 80–100)
MONOCYTES # BLD AUTO: 0.63 K/UL — SIGNIFICANT CHANGE UP (ref 0–0.9)
MONOCYTES NFR BLD AUTO: 8.3 % — SIGNIFICANT CHANGE UP (ref 2–14)
MRSA PCR RESULT.: SIGNIFICANT CHANGE UP
NEUTROPHILS # BLD AUTO: 4.36 K/UL — SIGNIFICANT CHANGE UP (ref 1.8–7.4)
NEUTROPHILS NFR BLD AUTO: 57.1 % — SIGNIFICANT CHANGE UP (ref 43–77)
PLATELET # BLD AUTO: 211 K/UL — SIGNIFICANT CHANGE UP (ref 150–400)
POTASSIUM SERPL-MCNC: 3.8 MMOL/L — SIGNIFICANT CHANGE UP (ref 3.5–5.3)
POTASSIUM SERPL-SCNC: 3.8 MMOL/L — SIGNIFICANT CHANGE UP (ref 3.5–5.3)
PROT SERPL-MCNC: 7.8 GM/DL — SIGNIFICANT CHANGE UP (ref 6–8.3)
PROTHROM AB SERPL-ACNC: 11.2 SEC — SIGNIFICANT CHANGE UP (ref 9.5–13)
RBC # BLD: 4.76 M/UL — SIGNIFICANT CHANGE UP (ref 4.2–5.8)
RBC # FLD: 14.3 % — SIGNIFICANT CHANGE UP (ref 10.3–14.5)
S AUREUS DNA NOSE QL NAA+PROBE: SIGNIFICANT CHANGE UP
SODIUM SERPL-SCNC: 138 MMOL/L — SIGNIFICANT CHANGE UP (ref 135–145)
WBC # BLD: 7.63 K/UL — SIGNIFICANT CHANGE UP (ref 3.8–10.5)
WBC # FLD AUTO: 7.63 K/UL — SIGNIFICANT CHANGE UP (ref 3.8–10.5)

## 2024-05-29 PROCEDURE — 87641 MR-STAPH DNA AMP PROBE: CPT

## 2024-05-29 PROCEDURE — 80053 COMPREHEN METABOLIC PANEL: CPT

## 2024-05-29 PROCEDURE — 85610 PROTHROMBIN TIME: CPT

## 2024-05-29 PROCEDURE — 83036 HEMOGLOBIN GLYCOSYLATED A1C: CPT

## 2024-05-29 PROCEDURE — 36415 COLL VENOUS BLD VENIPUNCTURE: CPT

## 2024-05-29 PROCEDURE — 99214 OFFICE O/P EST MOD 30 MIN: CPT | Mod: 25

## 2024-05-29 PROCEDURE — 85025 COMPLETE CBC W/AUTO DIFF WBC: CPT

## 2024-05-29 PROCEDURE — 85730 THROMBOPLASTIN TIME PARTIAL: CPT

## 2024-05-29 PROCEDURE — 87640 STAPH A DNA AMP PROBE: CPT

## 2024-05-29 PROCEDURE — 93010 ELECTROCARDIOGRAM REPORT: CPT

## 2024-05-29 PROCEDURE — 93005 ELECTROCARDIOGRAM TRACING: CPT

## 2024-05-29 NOTE — H&P PST ADULT - ASSESSMENT
Plan:  1. PST instructions given ; NPO status/  instructions to be given by ASU   2. Pt instructed to take following meds on day of surgery:   3. Pt instructed to take routine evening medications unless indicated   4. Stop NSAIDS ( Aspirin Alev Motrin Mobic Diclofenac), herbal supplements , MVI , Vitamin fish oil 7 days prior to surgery  unless   directed by surgeon or cardiologist;   5. Medical Optimization  with    6. EZ wash instructions given & mupirocin instructions given  7. Labs EKG CXR as per surgeon request   8. Pt denies covid symptoms shortness of breath fever cough   9.  Joint Education Booklet given   10. PATIENT WILL REQUIRE A ROLLING WALKER AT HOME DUE TO THEIR DIAGNOSIS OF OSTEOARTHRITIS OF HIP OR KNEE TO HELP COMPLETE MRADL'S.      CAPRINI VTE 2.0 SCORE [CLOT updated 2019]    AGE RELATED RISK FACTORS                                                       MOBILITY RELATED FACTORS  [ ] Age 41-60 years                                            (1 Point)                    [ ] Bed rest                                                        (1 Point)  [ ] Age: 61-74 years                                           (2 Points)                  [ ] Plaster cast                                                   (2 Points)  [ ] Age= 75 years                                              (3 Points)                    [ ] Bed bound for more than 72 hours                 (2 Points)    DISEASE RELATED RISK FACTORS                                               GENDER SPECIFIC FACTORS  [ ] Edema in the lower extremities                       (1 Point)              [ ] Pregnancy                                                     (1 Point)  [ ] Varicose veins                                               (1 Point)                     [ ] Post-partum < 6 weeks                                   (1 Point)             [ ] BMI > 25 Kg/m2                                            (1 Point)                     [ ] Hormonal therapy  or oral contraception          (1 Point)                 [ ] Sepsis (in the previous month)                        (1 Point)               [ ] History of pregnancy complications                 (1 point)  [ ] Pneumonia or serious lung disease                                               [ ] Unexplained or recurrent                     (1 Point)           (in the previous month)                               (1 Point)  [ ] Abnormal pulmonary function test                     (1 Point)                 SURGERY RELATED RISK FACTORS  [ ] Acute myocardial infarction                              (1 Point)               [ ]  Section                                             (1 Point)  [ ] Congestive heart failure (in the previous month)  (1 Point)      [ ] Minor surgery                                                  (1 Point)   [ ] Inflammatory bowel disease                             (1 Point)               [ ] Arthroscopic surgery                                        (2 Points)  [ ] Central venous access                                      (2 Points)                [ ] General surgery lasting more than 45 minutes (2 points)  [ ] Malignancy- Present or previous                   (2 Points)                [ ] Elective arthroplasty                                         (5 points)    [ ] Stroke (in the previous month)                          (5 Points)                                                                                                                                                           HEMATOLOGY RELATED FACTORS                                                 TRAUMA RELATED RISK FACTORS  [ ] Prior episodes of VTE                                     (3 Points)                [ ] Fracture of the hip, pelvis, or leg                       (5 Points)  [ ] Positive family history for VTE                         (3 Points)             [ ] Acute spinal cord injury (in the previous month)  (5 Points)  [ ] Prothrombin 87506 A                                     (3 Points)               [ ] Paralysis  (less than 1 month)                             (5 Points)  [ ] Factor V Leiden                                             (3 Points)                  [ ] Multiple Trauma within 1 month                        (5 Points)  [ ] Lupus anticoagulants                                     (3 Points)                                                           [ ] Anticardiolipin antibodies                               (3 Points)                                                       [ ] High homocysteine in the blood                      (3 Points)                                             [ ] Other congenital or acquired thrombophilia      (3 Points)                                                [ ] Heparin induced thrombocytopenia                  (3 Points)                                     Total Score [          ]    The Caprini score indicates that this patient is at high risk for a VTE event (score 6 or greater). Surgical patients in this group will benefit from both pharmacologic prophylaxis and intermittent compression devices.  The surgical team will determine the balance between VTE risk and bleeding risk, and other clinical considerations  73 year old male with h/o of Right TKR; c/o right knee pain with ROM  and swelling; Found to have loosening of hardware; he presents to PST for planned right revision TKR        Plan:  1. PST instructions given ; NPO status/  instructions to be given by ASU   2. Pt instructed to take following meds on day of surgery: metoprolol allopurinol   3. Pt instructed to take routine evening medications unless indicated   4. Stop NSAIDS ( Aspirin Alev Motrin Mobic Diclofenac), herbal supplements , MVI , Vitamin fish oil 7 days prior to surgery  unless   directed by surgeon or cardiologist;   5. Medical Optimization  with Dr Alejandra and cardio NP Flaca   6. EZ wash instructions given & mupirocin instructions given  7. Labs EKG  as per surgeon request   8. Pt denies covid symptoms shortness of breath fever cough   9.  Joint Education Booklet given   10. PATIENT WILL REQUIRE A ROLLING WALKER AT HOME DUE TO THEIR DIAGNOSIS OF OSTEOARTHRITIS OF HIP OR KNEE TO HELP COMPLETE MRADL'S.      CAPRINI VTE 2.0 SCORE [CLOT updated 2019]    AGE RELATED RISK FACTORS                                                       MOBILITY RELATED FACTORS  [ ] Age 41-60 years                                            (1 Point)                    [ ] Bed rest                                                        (1 Point)  [ ] Age: 61-74 years                                           (2 Points)                  [ ] Plaster cast                                                   (2 Points)  [x ] Age= 75 years                                              (3 Points)                    [ ] Bed bound for more than 72 hours                 (2 Points)    DISEASE RELATED RISK FACTORS                                               GENDER SPECIFIC FACTORS  [ ] Edema in the lower extremities                       (1 Point)              [ ] Pregnancy                                                     (1 Point)  [ ] Varicose veins                                               (1 Point)                     [ ] Post-partum < 6 weeks                                   (1 Point)             [x ] BMI > 25 Kg/m2                                            (1 Point)                     [ ] Hormonal therapy  or oral contraception          (1 Point)                 [ ] Sepsis (in the previous month)                        (1 Point)               [ ] History of pregnancy complications                 (1 point)  [ ] Pneumonia or serious lung disease                                               [ ] Unexplained or recurrent                     (1 Point)           (in the previous month)                               (1 Point)  [ ] Abnormal pulmonary function test                     (1 Point)                 SURGERY RELATED RISK FACTORS  [ ] Acute myocardial infarction                              (1 Point)               [ ]  Section                                             (1 Point)  [ ] Congestive heart failure (in the previous month)  (1 Point)      [ ] Minor surgery                                                  (1 Point)   [ ] Inflammatory bowel disease                             (1 Point)               [ ] Arthroscopic surgery                                        (2 Points)  [ ] Central venous access                                      (2 Points)                [ ] General surgery lasting more than 45 minutes (2 points)  [ ] Malignancy- Present or previous                   (2 Points)                [ x] Elective arthroplasty                                         (5 points)    [ ] Stroke (in the previous month)                          (5 Points)                                                                                                                                                           HEMATOLOGY RELATED FACTORS                                                 TRAUMA RELATED RISK FACTORS  [ ] Prior episodes of VTE                                     (3 Points)                [ ] Fracture of the hip, pelvis, or leg                       (5 Points)  [ ] Positive family history for VTE                         (3 Points)             [ ] Acute spinal cord injury (in the previous month)  (5 Points)  [ ] Prothrombin 56881 A                                     (3 Points)               [ ] Paralysis  (less than 1 month)                             (5 Points)  [ ] Factor V Leiden                                             (3 Points)                  [ ] Multiple Trauma within 1 month                        (5 Points)  [ ] Lupus anticoagulants                                     (3 Points)                                                           [ ] Anticardiolipin antibodies                               (3 Points)                                                       [ ] High homocysteine in the blood                      (3 Points)                                             [ ] Other congenital or acquired thrombophilia      (3 Points)                                                [ ] Heparin induced thrombocytopenia                  (3 Points)                                     Total Score [    9      ]    The Caprini score indicates that this patient is at high risk for a VTE event (score 6 or greater). Surgical patients in this group will benefit from both pharmacologic prophylaxis and intermittent compression devices.  The surgical team will determine the balance between VTE risk and bleeding risk, and other clinical considerations

## 2024-05-29 NOTE — H&P PST ADULT - ANESTHESIA, PREVIOUS REACTION, PROFILE
PT WITH CHEL; NEED CPAP or OXYGEN IMMEDIATELY AFTER EXTUBATION ; PT SAID THEY WERE UNABLE TO WAKE HIM HIM AFTER GALLBLADDER SURGERY IN [facility] 2020/delayed awakening/nausea/vomiting
Abdomen soft, non-tender and non-distended without organomegaly or masses. Normal bowel sounds.

## 2024-05-29 NOTE — H&P PST ADULT - NSICDXPASTMEDICALHX_GEN_ALL_CORE_FT
PAST MEDICAL HISTORY:  Carotid artery disease, unspecified laterality     H/O cholelithiasis     History of 2019 novel coronavirus disease (COVID-19)     History of hepatitis B     HTN (hypertension)     Hyperlipemia     Lumbar herniated disc     Lung nodule     Nephrolithiasis     OA (osteoarthritis)     Obesity     CHEL on CPAP     Spinal stenosis

## 2024-05-29 NOTE — H&P PST ADULT - HISTORY OF PRESENT ILLNESS
73 year old male with h/o of Right TKR; c/o right knee pain with ROM  and swelling; Found to have loosening of hardware; he presents to PST for planned right revision TKR

## 2024-05-29 NOTE — H&P PST ADULT - NSICDXPASTSURGICALHX_GEN_ALL_CORE_FT
PAST SURGICAL HISTORY:  H/O total knee replacement, left     H/O total knee replacement, right     History of ERCP     History of laparoscopic cholecystectomy     S/P arthroscopic knee surgery

## 2024-05-30 DIAGNOSIS — Z01.818 ENCOUNTER FOR OTHER PREPROCEDURAL EXAMINATION: ICD-10-CM

## 2024-06-06 ENCOUNTER — INPATIENT (INPATIENT)
Facility: HOSPITAL | Age: 74
LOS: 0 days | Discharge: HOME | DRG: 399 | End: 2024-06-07
Attending: GENERAL PRACTICE | Admitting: GENERAL PRACTICE
Payer: COMMERCIAL

## 2024-06-06 VITALS — HEIGHT: 69 IN

## 2024-06-06 DIAGNOSIS — Z96.651 PRESENCE OF RIGHT ARTIFICIAL KNEE JOINT: Chronic | ICD-10-CM

## 2024-06-06 DIAGNOSIS — Z90.49 ACQUIRED ABSENCE OF OTHER SPECIFIED PARTS OF DIGESTIVE TRACT: Chronic | ICD-10-CM

## 2024-06-06 DIAGNOSIS — K35.80 UNSPECIFIED ACUTE APPENDICITIS: ICD-10-CM

## 2024-06-06 DIAGNOSIS — Z96.652 PRESENCE OF LEFT ARTIFICIAL KNEE JOINT: Chronic | ICD-10-CM

## 2024-06-06 DIAGNOSIS — Z98.89 OTHER SPECIFIED POSTPROCEDURAL STATES: Chronic | ICD-10-CM

## 2024-06-06 DIAGNOSIS — Z98.890 OTHER SPECIFIED POSTPROCEDURAL STATES: Chronic | ICD-10-CM

## 2024-06-06 LAB
ALBUMIN SERPL ELPH-MCNC: 3.9 G/DL — SIGNIFICANT CHANGE UP (ref 3.3–5)
ALP SERPL-CCNC: 64 U/L — SIGNIFICANT CHANGE UP (ref 40–120)
ALT FLD-CCNC: 31 U/L — SIGNIFICANT CHANGE UP (ref 12–78)
ANION GAP SERPL CALC-SCNC: 3 MMOL/L — LOW (ref 5–17)
APPEARANCE UR: CLEAR — SIGNIFICANT CHANGE UP
AST SERPL-CCNC: 23 U/L — SIGNIFICANT CHANGE UP (ref 15–37)
BACTERIA # UR AUTO: NEGATIVE /HPF — SIGNIFICANT CHANGE UP
BASOPHILS # BLD AUTO: 0.04 K/UL — SIGNIFICANT CHANGE UP (ref 0–0.2)
BASOPHILS NFR BLD AUTO: 0.2 % — SIGNIFICANT CHANGE UP (ref 0–2)
BILIRUB SERPL-MCNC: 2.5 MG/DL — HIGH (ref 0.2–1.2)
BILIRUB UR-MCNC: NEGATIVE — SIGNIFICANT CHANGE UP
BLD GP AB SCN SERPL QL: SIGNIFICANT CHANGE UP
BUN SERPL-MCNC: 20 MG/DL — SIGNIFICANT CHANGE UP (ref 7–23)
CALCIUM SERPL-MCNC: 9.9 MG/DL — SIGNIFICANT CHANGE UP (ref 8.5–10.1)
CAST: 3 /LPF — SIGNIFICANT CHANGE UP (ref 0–4)
CHLORIDE SERPL-SCNC: 106 MMOL/L — SIGNIFICANT CHANGE UP (ref 96–108)
CO2 SERPL-SCNC: 28 MMOL/L — SIGNIFICANT CHANGE UP (ref 22–31)
COLOR SPEC: YELLOW — SIGNIFICANT CHANGE UP
CREAT SERPL-MCNC: 1.03 MG/DL — SIGNIFICANT CHANGE UP (ref 0.5–1.3)
DIFF PNL FLD: ABNORMAL
EGFR: 76 ML/MIN/1.73M2 — SIGNIFICANT CHANGE UP
EOSINOPHIL # BLD AUTO: 0.05 K/UL — SIGNIFICANT CHANGE UP (ref 0–0.5)
EOSINOPHIL NFR BLD AUTO: 0.3 % — SIGNIFICANT CHANGE UP (ref 0–6)
GLUCOSE SERPL-MCNC: 126 MG/DL — HIGH (ref 70–99)
GLUCOSE UR QL: NEGATIVE MG/DL — SIGNIFICANT CHANGE UP
HCT VFR BLD CALC: 43.2 % — SIGNIFICANT CHANGE UP (ref 39–50)
HGB BLD-MCNC: 14.7 G/DL — SIGNIFICANT CHANGE UP (ref 13–17)
IMM GRANULOCYTES NFR BLD AUTO: 0.5 % — SIGNIFICANT CHANGE UP (ref 0–0.9)
KETONES UR-MCNC: ABNORMAL MG/DL
LEUKOCYTE ESTERASE UR-ACNC: NEGATIVE — SIGNIFICANT CHANGE UP
LIDOCAIN IGE QN: 14 U/L — SIGNIFICANT CHANGE UP (ref 13–75)
LYMPHOCYTES # BLD AUTO: 1.6 K/UL — SIGNIFICANT CHANGE UP (ref 1–3.3)
LYMPHOCYTES # BLD AUTO: 9.5 % — LOW (ref 13–44)
MANUAL SMEAR VERIFICATION: SIGNIFICANT CHANGE UP
MCHC RBC-ENTMCNC: 30.8 PG — SIGNIFICANT CHANGE UP (ref 27–34)
MCHC RBC-ENTMCNC: 34 GM/DL — SIGNIFICANT CHANGE UP (ref 32–36)
MCV RBC AUTO: 90.6 FL — SIGNIFICANT CHANGE UP (ref 80–100)
MONOCYTES # BLD AUTO: 1.61 K/UL — HIGH (ref 0–0.9)
MONOCYTES NFR BLD AUTO: 9.6 % — SIGNIFICANT CHANGE UP (ref 2–14)
NEUTROPHILS # BLD AUTO: 13.37 K/UL — HIGH (ref 1.8–7.4)
NEUTROPHILS NFR BLD AUTO: 79.9 % — HIGH (ref 43–77)
NITRITE UR-MCNC: NEGATIVE — SIGNIFICANT CHANGE UP
PH UR: 5 — SIGNIFICANT CHANGE UP (ref 5–8)
PLAT MORPH BLD: NORMAL — SIGNIFICANT CHANGE UP
PLATELET # BLD AUTO: 201 K/UL — SIGNIFICANT CHANGE UP (ref 150–400)
POTASSIUM SERPL-MCNC: 3.9 MMOL/L — SIGNIFICANT CHANGE UP (ref 3.5–5.3)
POTASSIUM SERPL-SCNC: 3.9 MMOL/L — SIGNIFICANT CHANGE UP (ref 3.5–5.3)
PROT SERPL-MCNC: 8 GM/DL — SIGNIFICANT CHANGE UP (ref 6–8.3)
PROT UR-MCNC: NEGATIVE MG/DL — SIGNIFICANT CHANGE UP
RBC # BLD: 4.77 M/UL — SIGNIFICANT CHANGE UP (ref 4.2–5.8)
RBC # FLD: 14.6 % — HIGH (ref 10.3–14.5)
RBC BLD AUTO: NORMAL — SIGNIFICANT CHANGE UP
RBC CASTS # UR COMP ASSIST: 4 /HPF — SIGNIFICANT CHANGE UP (ref 0–4)
SODIUM SERPL-SCNC: 137 MMOL/L — SIGNIFICANT CHANGE UP (ref 135–145)
SP GR SPEC: 1.02 — SIGNIFICANT CHANGE UP (ref 1–1.03)
SQUAMOUS # UR AUTO: 2 /HPF — SIGNIFICANT CHANGE UP (ref 0–5)
UROBILINOGEN FLD QL: 0.2 MG/DL — SIGNIFICANT CHANGE UP (ref 0.2–1)
WBC # BLD: 16.76 K/UL — HIGH (ref 3.8–10.5)
WBC # FLD AUTO: 16.76 K/UL — HIGH (ref 3.8–10.5)
WBC UR QL: 1 /HPF — SIGNIFICANT CHANGE UP (ref 0–5)

## 2024-06-06 PROCEDURE — 86850 RBC ANTIBODY SCREEN: CPT

## 2024-06-06 PROCEDURE — 88304 TISSUE EXAM BY PATHOLOGIST: CPT

## 2024-06-06 PROCEDURE — 44970 LAPAROSCOPY APPENDECTOMY: CPT | Mod: AS

## 2024-06-06 PROCEDURE — 36415 COLL VENOUS BLD VENIPUNCTURE: CPT

## 2024-06-06 PROCEDURE — 88305 TISSUE EXAM BY PATHOLOGIST: CPT | Mod: 26

## 2024-06-06 PROCEDURE — 86900 BLOOD TYPING SEROLOGIC ABO: CPT

## 2024-06-06 PROCEDURE — 88304 TISSUE EXAM BY PATHOLOGIST: CPT | Mod: 26

## 2024-06-06 PROCEDURE — 86901 BLOOD TYPING SEROLOGIC RH(D): CPT

## 2024-06-06 PROCEDURE — 99285 EMERGENCY DEPT VISIT HI MDM: CPT

## 2024-06-06 PROCEDURE — 87040 BLOOD CULTURE FOR BACTERIA: CPT

## 2024-06-06 PROCEDURE — C1889: CPT

## 2024-06-06 PROCEDURE — 85025 COMPLETE CBC W/AUTO DIFF WBC: CPT

## 2024-06-06 PROCEDURE — 93010 ELECTROCARDIOGRAM REPORT: CPT

## 2024-06-06 PROCEDURE — 80048 BASIC METABOLIC PNL TOTAL CA: CPT

## 2024-06-06 PROCEDURE — 93005 ELECTROCARDIOGRAM TRACING: CPT

## 2024-06-06 PROCEDURE — C9399: CPT

## 2024-06-06 PROCEDURE — 99222 1ST HOSP IP/OBS MODERATE 55: CPT

## 2024-06-06 PROCEDURE — 74177 CT ABD & PELVIS W/CONTRAST: CPT | Mod: 26,MC

## 2024-06-06 RX ORDER — HEPARIN SODIUM 5000 [USP'U]/ML
5000 INJECTION INTRAVENOUS; SUBCUTANEOUS EVERY 8 HOURS
Refills: 0 | Status: DISCONTINUED | OUTPATIENT
Start: 2024-06-07 | End: 2024-06-07

## 2024-06-06 RX ORDER — ONDANSETRON 8 MG/1
4 TABLET, FILM COATED ORAL ONCE
Refills: 0 | Status: DISCONTINUED | OUTPATIENT
Start: 2024-06-06 | End: 2024-06-07

## 2024-06-06 RX ORDER — ONDANSETRON 8 MG/1
4 TABLET, FILM COATED ORAL ONCE
Refills: 0 | Status: COMPLETED | OUTPATIENT
Start: 2024-06-06 | End: 2024-06-06

## 2024-06-06 RX ORDER — METOPROLOL TARTRATE 50 MG
50 TABLET ORAL DAILY
Refills: 0 | Status: DISCONTINUED | OUTPATIENT
Start: 2024-06-06 | End: 2024-06-07

## 2024-06-06 RX ORDER — IBUPROFEN 200 MG
400 TABLET ORAL EVERY 8 HOURS
Refills: 0 | Status: DISCONTINUED | OUTPATIENT
Start: 2024-06-06 | End: 2024-06-07

## 2024-06-06 RX ORDER — SODIUM CHLORIDE 9 MG/ML
1000 INJECTION INTRAMUSCULAR; INTRAVENOUS; SUBCUTANEOUS ONCE
Refills: 0 | Status: COMPLETED | OUTPATIENT
Start: 2024-06-06 | End: 2024-06-06

## 2024-06-06 RX ORDER — GABAPENTIN 400 MG/1
900 CAPSULE ORAL
Refills: 0 | Status: DISCONTINUED | OUTPATIENT
Start: 2024-06-06 | End: 2024-06-07

## 2024-06-06 RX ORDER — PIPERACILLIN AND TAZOBACTAM 4; .5 G/20ML; G/20ML
3.38 INJECTION, POWDER, LYOPHILIZED, FOR SOLUTION INTRAVENOUS EVERY 8 HOURS
Refills: 0 | Status: DISCONTINUED | OUTPATIENT
Start: 2024-06-06 | End: 2024-06-07

## 2024-06-06 RX ORDER — MORPHINE SULFATE 50 MG/1
4 CAPSULE, EXTENDED RELEASE ORAL ONCE
Refills: 0 | Status: DISCONTINUED | OUTPATIENT
Start: 2024-06-06 | End: 2024-06-06

## 2024-06-06 RX ORDER — FENTANYL CITRATE 50 UG/ML
50 INJECTION INTRAVENOUS
Refills: 0 | Status: DISCONTINUED | OUTPATIENT
Start: 2024-06-06 | End: 2024-06-07

## 2024-06-06 RX ORDER — SODIUM CHLORIDE 9 MG/ML
1000 INJECTION INTRAMUSCULAR; INTRAVENOUS; SUBCUTANEOUS
Refills: 0 | Status: DISCONTINUED | OUTPATIENT
Start: 2024-06-06 | End: 2024-06-07

## 2024-06-06 RX ORDER — SODIUM CHLORIDE 9 MG/ML
1000 INJECTION, SOLUTION INTRAVENOUS
Refills: 0 | Status: DISCONTINUED | OUTPATIENT
Start: 2024-06-06 | End: 2024-06-07

## 2024-06-06 RX ORDER — PIPERACILLIN AND TAZOBACTAM 4; .5 G/20ML; G/20ML
3.38 INJECTION, POWDER, LYOPHILIZED, FOR SOLUTION INTRAVENOUS ONCE
Refills: 0 | Status: COMPLETED | OUTPATIENT
Start: 2024-06-06 | End: 2024-06-06

## 2024-06-06 RX ADMIN — PIPERACILLIN AND TAZOBACTAM 200 GRAM(S): 4; .5 INJECTION, POWDER, LYOPHILIZED, FOR SOLUTION INTRAVENOUS at 21:00

## 2024-06-06 RX ADMIN — ONDANSETRON 4 MILLIGRAM(S): 8 TABLET, FILM COATED ORAL at 19:50

## 2024-06-06 RX ADMIN — SODIUM CHLORIDE 1000 MILLILITER(S): 9 INJECTION INTRAMUSCULAR; INTRAVENOUS; SUBCUTANEOUS at 19:50

## 2024-06-06 RX ADMIN — MORPHINE SULFATE 4 MILLIGRAM(S): 50 CAPSULE, EXTENDED RELEASE ORAL at 19:50

## 2024-06-06 NOTE — ED STATDOCS - PROGRESS NOTE DETAILS
73-year-old male with a past medical history of kidney stones, high blood pressure, high cholesterol, past surgical history of cholecystectomy presents with right-sided abdominal pain since noon today.  Patient states he was driving when the pain started and has been pretty constant and worsening in severity since noon.  Patient states that it feels different from his prior kidney stones.  Associated nausea.  Denies chills, fever, vomiting, diarrhea, urinary symptoms.  Moderate tenderness to palpation to the right mid and lateral aspect of his abdomen.  Will check labs, UA, CT, reevaluate. -Ish Marquez PA-C +appy on CT. Pt with also leukocytosis. BC ordered. ZOsyn ordered and discussed case with surgeon Dr Tellez. Pt to be admitted to his service. -Ish Marquez PA-C

## 2024-06-06 NOTE — ED ADULT NURSE NOTE - OBJECTIVE STATEMENT
Pt presents to ED w c/o RLQ pain since 11am this morning. pt states pain was sudden. denies n/v/d, fever, chills. last meal @10am and last drink @3pm. pt has no further concerns

## 2024-06-06 NOTE — H&P ADULT - ASSESSMENT
74 y/o male with Hx of obesity, CHEL on home CPAP, HTN, HLD, s/p lap cholecystectomy, p/w RLQ pain x 1 day, CT concerning for acute appendicitis     - Admit to General Surgery under Dr. Almagure  - NPO/IVF   - Zosyn  - pre-op labs, Bcx  - Add on to OR for lap appendectomy, possible open     d/w Dr. Almaguer   General Surgery   Sophia Lacy PA-C

## 2024-06-06 NOTE — BRIEF OPERATIVE NOTE - NSICDXBRIEFPOSTOP_GEN_ALL_CORE_FT
POST-OP DIAGNOSIS:  Acute appendicitis with localized peritonitis 06-Jun-2024 23:45:00  Bereket Almaguer

## 2024-06-06 NOTE — ED STATDOCS - OBJECTIVE STATEMENT
74 y/o male with PMHx of kidney stones, HTN, HLD, cholecystectomy presents to the ED c/o 1 day of intermittent RLQ pain and nausea/vomiting. Denies chest pain, SOB, urinary symptoms. Patient is still passing gas today, normal BM yesterday.

## 2024-06-06 NOTE — H&P ADULT - HISTORY OF PRESENT ILLNESS
74 y/o male with PMHx of CHEL on CPAP at home, kidney stones, HTN, HLD, cholecystectomy presents to the ED c/o 1 day of intermittent RLQ pain and nausea.  denies fever/chills, vomiting. denies dizziness, SOB, CP or palpitations. NPO since 7AM

## 2024-06-06 NOTE — H&P ADULT - NSHPPHYSICALEXAM_GEN_ALL_CORE
T(C): 37.1 (06-06-24 @ 17:46), Max: 37.1 (06-06-24 @ 17:46)  HR: 79 (06-06-24 @ 17:46) (79 - 79)  BP: 128/72 (06-06-24 @ 17:46) (128/72 - 128/72)  RR: 16 (06-06-24 @ 17:46) (16 - 16)  SpO2: 94% (06-06-24 @ 17:46) (94% - 94%)    CONSTITUTIONAL: obese, Well groomed, no apparent distress  EYES: PERRLA and symmetric, EOMI, No conjunctival or scleral injection, non-icteric  ENMT: Oral mucosa with moist membranes. Normal dentition; no pharyngeal injection or exudates             NECK: Supple, symmetric and without tracheal deviation   RESP: No respiratory distress, no use of accessory muscles; CTA b/l, no WRR  CV: RRR, +S1S2, no MRG; no JVD; no peripheral edema  GI: Soft, ND, moderate tenderness at RLQ. no rebound tenderness  LYMPH: No cervical LAD or tenderness; no axillary LAD or tenderness; no inguinal LAD or tenderness  MSK: Normal gait; No digital clubbing or cyanosis; examination of the (head/neck/spine/ribs/pelvis, RUE, LUE, RLE, LLE) without misalignment,            Normal ROM without pain, no spinal tenderness, normal muscle strength/tone  SKIN: No rashes or ulcers noted; no subcutaneous nodules or induration palpable  NEURO: CN II-XII intact; normal reflexes in upper and lower extremities, sensation intact in upper and lower extremities b/l to light touch   PSYCH: Appropriate insight/judgment; A+O x 3, mood and affect appropriate, recent/remote memory intact

## 2024-06-06 NOTE — H&P ADULT - NSHPPOADEEPVENOUSTHROMB_GEN_A_CORE
BRIEF OPERATIVE NOTE    Date of Procedure: 3/26/2018   Preoperative Diagnosis: ESRD  Postoperative Diagnosis: ESRD    Procedure(s): CREATION LEFT ARM AVF (Nathaly Murphy)   Surgeon: Chikis Davila MD   Anesthesia: block   Estimated Blood Loss: none  Specimens: none   Findings: surprisingly nice vein after sympathetic block   Complications: none  Implants: * No implants in log * Subjective:      Patient ID: Juan Pablo Shultz is a 45 y.o. female who presents today for:  Chief Complaint   Patient presents with    Follow-up     1 month F/U left knee pain; still having pain which seems to be getting worse; diclofenac tablets not helping with pain. HPI  Patient comes in for evaluation of left knee pain. Continues have difficulty pain. This point is not decreased with the new anti-inflammatory gave her. Pain is globally in the knee. No new injuries. Past Medical History:   Diagnosis Date    Age related osteoporosis     Headache     Hypertension       Past Surgical History:   Procedure Laterality Date    CHOLECYSTECTOMY       Social History     Socioeconomic History    Marital status:      Spouse name: Not on file    Number of children: Not on file    Years of education: Not on file    Highest education level: Not on file   Occupational History    Not on file   Tobacco Use    Smoking status: Never Smoker    Smokeless tobacco: Never Used   Vaping Use    Vaping Use: Never used   Substance and Sexual Activity    Alcohol use: No    Drug use: No    Sexual activity: Not on file   Other Topics Concern    Not on file   Social History Narrative    Not on file     Social Determinants of Health     Financial Resource Strain: Low Risk     Difficulty of Paying Living Expenses: Not hard at all   Food Insecurity: No Food Insecurity    Worried About [facility] in the Last Year: Never true    [address] [facility] [address] in the Last Year: Never true   Transportation Needs: No Transportation Needs    Lack of Transportation (Medical): No    Lack of Transportation (Non-Medical):  No   Physical Activity:     Days of Exercise per Week:     Minutes of Exercise per Session:    Stress:     Feeling of Stress :    Social Connections:     Frequency of Communication with Friends and Family:     Frequency of Social Gatherings with Friends and Family:     Attends [employer] Services:  Active Member of Clubs or Organizations:     Attends Club or Organization Meetings:     Marital Status:    Intimate Partner Violence:     Fear of Current or Ex-Partner:     Emotionally Abused:     Physically Abused:     Sexually Abused:      Family History   Problem Relation Age of Onset    Heart Disease Mother     Stroke Father      Allergies   Allergen Reactions    Asa [Aspirin] Hives     Current Outpatient Medications on File Prior to Visit   Medication Sig Dispense Refill    celecoxib (CELEBREX) 200 MG capsule Take 1 capsule by mouth daily 60 capsule 3    CALCIUM-VITAMIN D PO Take by mouth      cyclobenzaprine (FLEXERIL) 10 MG tablet Take 1 tablet by mouth 3 times daily as needed for Muscle spasms Take 10 mg by mouth 3 times daily as needed for Muscle spasms 90 tablet 3    alendronate (FOSAMAX) 70 MG tablet Take 1 tablet by mouth every 7 days 4 tablet 3    lisinopril (PRINIVIL;ZESTRIL) 10 MG tablet Take 1 tablet by mouth daily 30 tablet 3     No current facility-administered medications on file prior to visit. Review of Systems  Pain left knee. No locking no giving way. Objective:   Pulse 76   Temp 96.6 °F (35.9 °C) (Temporal)   Ht 5' 1\" (1.549 m)   Wt 286 lb (129.7 kg)   SpO2 97%   BMI 54.04 kg/m²     Ortho Exam  57-year-old overweight female ambulating without any assistive devices. Pain on the medial and lateral joint line of the left knee. Range of motion is unchanged from 0 degrees to approximately 100 degrees of flexion. No instability ligament wise. No palpable cords posteriorly. No calf tenderness. Negative roll test left hip. Radiographs and Laboratory Studies:     Diagnostic Imaging Studies:    Review the x-rays we have in the PACS system we saw again today show some degenerative changes in the left knee. No acute fracture dislocations.     Laboratory Studies:   Lab Results   Component Value Date    WBC 5.9 03/17/2021    HGB 13.1 03/17/2021    HCT 39.0 03/17/2021    MCV 93.6 03/17/2021     03/17/2021     No results found for: SEDRATE  No results found for: CRP    Assessment:      Diagnosis Orders   1. Arthritis of knee, left     2. Obesity with serious comorbidity, unspecified classification, unspecified obesity type            Plan: At this point I discussed the problem with the patient. Recommendation would be to change anti-inflammatory versus steroid injection. Pros and cons were discussed she wants to go ahead proceed with a steroid injection. No allergies to Xylocaine or cortisone. After discussing procedure with the patient anterior lateral pulse was used in the left knee she was given 4 cc 1% Xylocaine without epinephrine 1 cc of Depo-Medrol. This was given after the area was cleansed with Betadine alcohol. Tolerated procedure well. We will see her back 1 month to recheck. Any problems or difficulties prior to that she is notify us. Instructed the patient she needs to work on weight reduction program.  Her BMI is currently 54+. No orders of the defined types were placed in this encounter. No orders of the defined types were placed in this encounter. No follow-ups on file.       Jeremiah Kerr MD no

## 2024-06-06 NOTE — ED ADULT NURSE REASSESSMENT NOTE - NS ED NURSE REASSESS COMMENT FT1
Medicated as per MD order. Pending blood culture draw prior to IV ABX administration. Assumed care of pt at 1900. Medicated as per MD order. Pending blood culture draw prior to IV ABX administration. Wife at the bedside. PA educating pt and family on plan of admission. Respirations are even and unlabored, in NAD.

## 2024-06-06 NOTE — ED STATDOCS - PHYSICAL EXAMINATION
Constitutional: well appearing, NAD AAOx3  Eyes: EOMI, PERRL  Head: Normocephalic atraumatic  Mouth: no airway obstruction, posterior oropharynx clear without erythema or exudate  Neck: supple  Cardiac: regular rate and rhythm, no MRG  Resp: Lungs CTAB  GI: +Right flank TTP, RLQ and RUQ TTP. -CVA TTP.  Neuro: CN2-12 intact, strength 5/5x4, sensation grossly intact  Skin: No rashes

## 2024-06-06 NOTE — BRIEF OPERATIVE NOTE - NSICDXBRIEFPREOP_GEN_ALL_CORE_FT
PRE-OP DIAGNOSIS:  Acute appendicitis with localized peritonitis 06-Jun-2024 23:44:43  Bereket Almaguer

## 2024-06-06 NOTE — ED STATDOCS - ATTENDING APP SHARED VISIT CONTRIBUTION OF CARE
Contact your primary care physician schedule follow-up visit for re-evaluation.  Continued follow-up with your oncologist as scheduled.  Return to the emergency department for any recurrence of symptoms, worsening symptoms, fever or any other concerns  
I, Kimani Segovia, DO personally saw the patient with ANA M.  I have personally performed a face to face diagnostic evaluation on this patient.  I have reviewed the ANA M note and agree with the history, exam, and plan of care, except as noted.  I personally saw the patient and performed a substantive portion of the visit including all aspects of the medical decision making.

## 2024-06-06 NOTE — H&P ADULT - NS ATTEND AMEND GEN_ALL_CORE FT
72 yo male with h/o sleep apnea on CPAP, HTN, hep B c/o lower abd pain earlier today, nausea, emesis. WBC 16,000. CT Abd showed acute appendicitis.  Abd obese RLQ tend, rebound    72 yo male with acute appendicitis  -Will take him to OR for lap appendectomy/ risk/benefit of surgery explained to patient, including but not limited to intrabdominal abscess, wound infection, bleeding, DVT, PE, PNA  -Will need CPAP after extubation  -IV Abx

## 2024-06-07 ENCOUNTER — TRANSCRIPTION ENCOUNTER (OUTPATIENT)
Age: 74
End: 2024-06-07

## 2024-06-07 VITALS
OXYGEN SATURATION: 94 % | HEART RATE: 70 BPM | DIASTOLIC BLOOD PRESSURE: 66 MMHG | RESPIRATION RATE: 18 BRPM | TEMPERATURE: 98 F | SYSTOLIC BLOOD PRESSURE: 116 MMHG

## 2024-06-07 DIAGNOSIS — K35.80 UNSPECIFIED ACUTE APPENDICITIS: ICD-10-CM

## 2024-06-07 PROBLEM — M51.26 OTHER INTERVERTEBRAL DISC DISPLACEMENT, LUMBAR REGION: Chronic | Status: ACTIVE | Noted: 2024-05-29

## 2024-06-07 PROBLEM — Z86.19 PERSONAL HISTORY OF OTHER INFECTIOUS AND PARASITIC DISEASES: Chronic | Status: ACTIVE | Noted: 2024-05-29

## 2024-06-07 PROBLEM — I77.9 DISORDER OF ARTERIES AND ARTERIOLES, UNSPECIFIED: Chronic | Status: ACTIVE | Noted: 2024-05-29

## 2024-06-07 PROBLEM — M48.00 SPINAL STENOSIS, SITE UNSPECIFIED: Chronic | Status: ACTIVE | Noted: 2024-05-29

## 2024-06-07 PROBLEM — Z87.19 PERSONAL HISTORY OF OTHER DISEASES OF THE DIGESTIVE SYSTEM: Chronic | Status: ACTIVE | Noted: 2024-05-29

## 2024-06-07 PROBLEM — E66.9 OBESITY, UNSPECIFIED: Chronic | Status: ACTIVE | Noted: 2024-05-29

## 2024-06-07 PROBLEM — R91.1 SOLITARY PULMONARY NODULE: Chronic | Status: ACTIVE | Noted: 2024-05-29

## 2024-06-07 LAB
ANION GAP SERPL CALC-SCNC: 8 MMOL/L — SIGNIFICANT CHANGE UP (ref 5–17)
BASOPHILS # BLD AUTO: 0.01 K/UL — SIGNIFICANT CHANGE UP (ref 0–0.2)
BASOPHILS NFR BLD AUTO: 0.1 % — SIGNIFICANT CHANGE UP (ref 0–2)
BUN SERPL-MCNC: 26 MG/DL — HIGH (ref 7–23)
CALCIUM SERPL-MCNC: 9 MG/DL — SIGNIFICANT CHANGE UP (ref 8.5–10.1)
CHLORIDE SERPL-SCNC: 105 MMOL/L — SIGNIFICANT CHANGE UP (ref 96–108)
CO2 SERPL-SCNC: 25 MMOL/L — SIGNIFICANT CHANGE UP (ref 22–31)
CREAT SERPL-MCNC: 1.31 MG/DL — HIGH (ref 0.5–1.3)
EGFR: 57 ML/MIN/1.73M2 — LOW
EOSINOPHIL # BLD AUTO: 0 K/UL — SIGNIFICANT CHANGE UP (ref 0–0.5)
EOSINOPHIL NFR BLD AUTO: 0 % — SIGNIFICANT CHANGE UP (ref 0–6)
GLUCOSE SERPL-MCNC: 173 MG/DL — HIGH (ref 70–99)
HCT VFR BLD CALC: 41.1 % — SIGNIFICANT CHANGE UP (ref 39–50)
HGB BLD-MCNC: 14 G/DL — SIGNIFICANT CHANGE UP (ref 13–17)
IMM GRANULOCYTES NFR BLD AUTO: 0.4 % — SIGNIFICANT CHANGE UP (ref 0–0.9)
LYMPHOCYTES # BLD AUTO: 0.78 K/UL — LOW (ref 1–3.3)
LYMPHOCYTES # BLD AUTO: 6.6 % — LOW (ref 13–44)
MCHC RBC-ENTMCNC: 30.8 PG — SIGNIFICANT CHANGE UP (ref 27–34)
MCHC RBC-ENTMCNC: 34.1 GM/DL — SIGNIFICANT CHANGE UP (ref 32–36)
MCV RBC AUTO: 90.3 FL — SIGNIFICANT CHANGE UP (ref 80–100)
MONOCYTES # BLD AUTO: 0.26 K/UL — SIGNIFICANT CHANGE UP (ref 0–0.9)
MONOCYTES NFR BLD AUTO: 2.2 % — SIGNIFICANT CHANGE UP (ref 2–14)
NEUTROPHILS # BLD AUTO: 10.69 K/UL — HIGH (ref 1.8–7.4)
NEUTROPHILS NFR BLD AUTO: 90.7 % — HIGH (ref 43–77)
PLATELET # BLD AUTO: 185 K/UL — SIGNIFICANT CHANGE UP (ref 150–400)
POTASSIUM SERPL-MCNC: 3.6 MMOL/L — SIGNIFICANT CHANGE UP (ref 3.5–5.3)
POTASSIUM SERPL-SCNC: 3.6 MMOL/L — SIGNIFICANT CHANGE UP (ref 3.5–5.3)
RBC # BLD: 4.55 M/UL — SIGNIFICANT CHANGE UP (ref 4.2–5.8)
RBC # FLD: 14.7 % — HIGH (ref 10.3–14.5)
SODIUM SERPL-SCNC: 138 MMOL/L — SIGNIFICANT CHANGE UP (ref 135–145)
WBC # BLD: 11.79 K/UL — HIGH (ref 3.8–10.5)
WBC # FLD AUTO: 11.79 K/UL — HIGH (ref 3.8–10.5)

## 2024-06-07 RX ORDER — ONDANSETRON 8 MG/1
4 TABLET, FILM COATED ORAL EVERY 8 HOURS
Refills: 0 | Status: DISCONTINUED | OUTPATIENT
Start: 2024-06-07 | End: 2024-06-07

## 2024-06-07 RX ORDER — ACETAMINOPHEN 500 MG
650 TABLET ORAL EVERY 6 HOURS
Refills: 0 | Status: DISCONTINUED | OUTPATIENT
Start: 2024-06-07 | End: 2024-06-07

## 2024-06-07 RX ORDER — TRAMADOL HYDROCHLORIDE 50 MG/1
25 TABLET ORAL EVERY 6 HOURS
Refills: 0 | Status: DISCONTINUED | OUTPATIENT
Start: 2024-06-07 | End: 2024-06-07

## 2024-06-07 RX ORDER — ACETAMINOPHEN 500 MG
2 TABLET ORAL
Qty: 0 | Refills: 0 | DISCHARGE
Start: 2024-06-07

## 2024-06-07 RX ORDER — IBUPROFEN 200 MG
1 TABLET ORAL
Qty: 0 | Refills: 0 | DISCHARGE
Start: 2024-06-07

## 2024-06-07 RX ORDER — ASPIRIN/CALCIUM CARB/MAGNESIUM 324 MG
0 TABLET ORAL
Refills: 0 | DISCHARGE

## 2024-06-07 RX ADMIN — PIPERACILLIN AND TAZOBACTAM 25 GRAM(S): 4; .5 INJECTION, POWDER, LYOPHILIZED, FOR SOLUTION INTRAVENOUS at 05:38

## 2024-06-07 RX ADMIN — HEPARIN SODIUM 5000 UNIT(S): 5000 INJECTION INTRAVENOUS; SUBCUTANEOUS at 05:39

## 2024-06-07 RX ADMIN — SODIUM CHLORIDE 125 MILLILITER(S): 9 INJECTION INTRAMUSCULAR; INTRAVENOUS; SUBCUTANEOUS at 02:24

## 2024-06-07 RX ADMIN — SODIUM CHLORIDE 75 MILLILITER(S): 9 INJECTION, SOLUTION INTRAVENOUS at 00:06

## 2024-06-07 RX ADMIN — ONDANSETRON 4 MILLIGRAM(S): 8 TABLET, FILM COATED ORAL at 02:30

## 2024-06-07 NOTE — DISCHARGE NOTE NURSING/CASE MANAGEMENT/SOCIAL WORK - NSDCVIVACCINE_GEN_ALL_CORE_FT
influenza, injectable, quadrivalent, preservative free; 14-Oct-2020 15:22; Angela Curry (HONG); Postabon; 5D4H4 (Exp. Date: 30-Jun-2021); IntraMuscular; Deltoid Left.; 0.5 milliLiter(s); VIS (VIS Published: 15-Aug-2019, VIS Presented: 14-Oct-2020);

## 2024-06-07 NOTE — DISCHARGE NOTE PROVIDER - NSDCFUSCHEDAPPT_GEN_ALL_CORE_FT
[facility]  HNT PreAdmits  Scheduled Appointment: 06/10/2024    [facility]  ORTH[facility]U [facility]LE[facility] [address]  Scheduled Appointment: 06/10/2024    Jaime Francis  [facility]  THORR [address] [facility] [address]  Scheduled Appointment: 09/04/2024

## 2024-06-07 NOTE — DISCHARGE NOTE PROVIDER - CARE PROVIDER_API CALL
Bereket Almaguer.  Surgery  [address] [place] [id number]  Phone: [phone]  Fax: [phone]  Scheduled Appointment: 06/13/2024 09:30 AM

## 2024-06-07 NOTE — PATIENT PROFILE ADULT - FALL HARM RISK - RISK INTERVENTIONS
Assistance OOB with selected safe patient handling equipment/Assistance with ambulation/Communicate Fall Risk and Risk Factors to all staff, patient, and family/Monitor gait and stability/Reinforce activity limits and safety measures with patient and family/Sit up slowly, dangle for a short time, stand at bedside before walking/Use of alarms - bed, chair and/or voice tab/Visual Cue: Yellow wristband/Bed in lowest position, wheels locked, appropriate side rails in place/Call bell, personal items and telephone in reach/Instruct patient to call for assistance before getting out of bed or chair/Non-slip footwear when patient is out of bed/[place] to call system/Physically safe environment - no spills, clutter or unnecessary equipment/Purposeful Proactive Rounding/Room/bathroom lighting operational, light cord in reach

## 2024-06-07 NOTE — DISCHARGE NOTE PROVIDER - NSDCMRMEDTOKEN_GEN_ALL_CORE_FT
acetaminophen 325 mg oral tablet: 2 tab(s) orally every 6 hours As needed Mild Pain (1 - 3)  allopurinol 100 mg oral tablet: 1 tab(s) orally once a day  cholecalciferol oral tablet: 1000 unit(s) orally once a day  Co Q-10 100 mg oral capsule: 1 cap(s) orally once a day  gabapentin 300 mg oral capsule: 3 cap(s) orally 2 times a day  hydroCHLOROthiazide 25 mg oral tablet: 1 tab(s) orally once a day  ibuprofen 400 mg oral tablet: 1 tab(s) orally every 8 hours As needed Mild Pain (1 - 3)  metoprolol succinate 50 mg oral tablet, extended release: 1 tab(s) orally once a day  potassium citrate 10 mEq oral tablet, extended release: 1 tab(s) orally 2 times a day  rosuvastatin 20 mg oral capsule: 1 cap(s) orally once a day  Vitamin E:

## 2024-06-10 ENCOUNTER — APPOINTMENT (OUTPATIENT)
Dept: ORTHOPEDIC SURGERY | Facility: HOSPITAL | Age: 74
End: 2024-06-10

## 2024-06-12 DIAGNOSIS — Z79.82 LONG TERM (CURRENT) USE OF ASPIRIN: ICD-10-CM

## 2024-06-12 DIAGNOSIS — I10 ESSENTIAL (PRIMARY) HYPERTENSION: ICD-10-CM

## 2024-06-12 DIAGNOSIS — Z90.49 ACQUIRED ABSENCE OF OTHER SPECIFIED PARTS OF DIGESTIVE TRACT: ICD-10-CM

## 2024-06-12 DIAGNOSIS — G47.33 OBSTRUCTIVE SLEEP APNEA (ADULT) (PEDIATRIC): ICD-10-CM

## 2024-06-12 DIAGNOSIS — E78.5 HYPERLIPIDEMIA, UNSPECIFIED: ICD-10-CM

## 2024-06-12 DIAGNOSIS — K35.80 UNSPECIFIED ACUTE APPENDICITIS: ICD-10-CM

## 2024-06-14 ENCOUNTER — APPOINTMENT (OUTPATIENT)
Dept: ORTHOPEDIC SURGERY | Facility: CLINIC | Age: 74
End: 2024-06-14

## 2024-06-14 RX ORDER — CELECOXIB 100 MG/1
200 CAPSULE ORAL EVERY 12 HOURS
Refills: 0 | Status: DISCONTINUED | OUTPATIENT
Start: 2024-06-17 | End: 2024-06-19

## 2024-06-14 RX ORDER — HYDROMORPHONE HCL 0.2 MG/ML
0.5 INJECTION, SOLUTION INTRAVENOUS
Refills: 0 | Status: DISCONTINUED | OUTPATIENT
Start: 2024-06-17 | End: 2024-06-19

## 2024-06-14 RX ORDER — TRANEXAMIC ACID 100 MG/ML
1000 INJECTION, SOLUTION INTRAVENOUS ONCE
Refills: 0 | Status: DISCONTINUED | OUTPATIENT
Start: 2024-06-17 | End: 2024-06-19

## 2024-06-14 RX ORDER — DEXTROSE MONOHYDRATE AND SODIUM CHLORIDE 5; .3 G/100ML; G/100ML
1000 INJECTION, SOLUTION INTRAVENOUS
Refills: 0 | Status: DISCONTINUED | OUTPATIENT
Start: 2024-06-17 | End: 2024-06-19

## 2024-06-14 RX ORDER — BISACODYL 5 MG
10 TABLET, DELAYED RELEASE (ENTERIC COATED) ORAL ONCE
Refills: 0 | Status: DISCONTINUED | OUTPATIENT
Start: 2024-06-19 | End: 2024-06-19

## 2024-06-14 RX ORDER — PANTOPRAZOLE SODIUM 40 MG/10ML
40 INJECTION, POWDER, FOR SOLUTION INTRAVENOUS
Refills: 0 | Status: DISCONTINUED | OUTPATIENT
Start: 2024-06-17 | End: 2024-06-19

## 2024-06-14 RX ORDER — ACETAMINOPHEN 325 MG
1000 TABLET ORAL EVERY 8 HOURS
Refills: 0 | Status: DISCONTINUED | OUTPATIENT
Start: 2024-06-17 | End: 2024-06-19

## 2024-06-14 RX ORDER — SENNOSIDES 8.6 MG
2 TABLET ORAL AT BEDTIME
Refills: 0 | Status: DISCONTINUED | OUTPATIENT
Start: 2024-06-17 | End: 2024-06-19

## 2024-06-14 RX ORDER — ONDANSETRON HYDROCHLORIDE 2 MG/ML
8 INJECTION INTRAMUSCULAR; INTRAVENOUS EVERY 8 HOURS
Refills: 0 | Status: DISCONTINUED | OUTPATIENT
Start: 2024-06-17 | End: 2024-06-19

## 2024-06-14 RX ORDER — POLYETHYLENE GLYCOL 3350 1 G/G
17 POWDER ORAL AT BEDTIME
Refills: 0 | Status: DISCONTINUED | OUTPATIENT
Start: 2024-06-17 | End: 2024-06-19

## 2024-06-14 RX ORDER — FOLIC ACID
1 POWDER (GRAM) MISCELLANEOUS DAILY
Refills: 0 | Status: DISCONTINUED | OUTPATIENT
Start: 2024-06-17 | End: 2024-06-19

## 2024-06-14 RX ORDER — FERROUS SULFATE 325(65) MG
325 TABLET ORAL DAILY
Refills: 0 | Status: DISCONTINUED | OUTPATIENT
Start: 2024-06-17 | End: 2024-06-19

## 2024-06-14 RX ORDER — PROCHLORPERAZINE MALEATE 10 MG/1
10 TABLET, FILM COATED ORAL EVERY 8 HOURS
Refills: 0 | Status: DISCONTINUED | OUTPATIENT
Start: 2024-06-17 | End: 2024-06-19

## 2024-06-17 ENCOUNTER — APPOINTMENT (OUTPATIENT)
Dept: ORTHOPEDIC SURGERY | Facility: HOSPITAL | Age: 74
End: 2024-06-17

## 2024-06-17 ENCOUNTER — INPATIENT (INPATIENT)
Facility: HOSPITAL | Age: 74
LOS: 1 days | Discharge: HOME HEALTH | DRG: 349 | End: 2024-06-19
Attending: STUDENT IN AN ORGANIZED HEALTH CARE EDUCATION/TRAINING PROGRAM | Admitting: STUDENT IN AN ORGANIZED HEALTH CARE EDUCATION/TRAINING PROGRAM
Payer: OTHER MISCELLANEOUS

## 2024-06-17 ENCOUNTER — RESULT REVIEW (OUTPATIENT)
Age: 74
End: 2024-06-17

## 2024-06-17 ENCOUNTER — TRANSCRIPTION ENCOUNTER (OUTPATIENT)
Age: 74
End: 2024-06-17

## 2024-06-17 VITALS
TEMPERATURE: 99 F | HEART RATE: 65 BPM | SYSTOLIC BLOOD PRESSURE: 120 MMHG | WEIGHT: 240.08 LBS | HEIGHT: 69 IN | DIASTOLIC BLOOD PRESSURE: 65 MMHG | RESPIRATION RATE: 16 BRPM | OXYGEN SATURATION: 97 %

## 2024-06-17 DIAGNOSIS — Z98.89 OTHER SPECIFIED POSTPROCEDURAL STATES: Chronic | ICD-10-CM

## 2024-06-17 DIAGNOSIS — Z96.652 PRESENCE OF LEFT ARTIFICIAL KNEE JOINT: Chronic | ICD-10-CM

## 2024-06-17 DIAGNOSIS — Z96.651 PRESENCE OF RIGHT ARTIFICIAL KNEE JOINT: Chronic | ICD-10-CM

## 2024-06-17 DIAGNOSIS — Z90.49 ACQUIRED ABSENCE OF OTHER SPECIFIED PARTS OF DIGESTIVE TRACT: Chronic | ICD-10-CM

## 2024-06-17 DIAGNOSIS — Z98.890 OTHER SPECIFIED POSTPROCEDURAL STATES: Chronic | ICD-10-CM

## 2024-06-17 DIAGNOSIS — T84.032A MECHANICAL LOOSENING OF INTERNAL RIGHT KNEE PROSTHETIC JOINT, INITIAL ENCOUNTER: ICD-10-CM

## 2024-06-17 LAB
ANION GAP SERPL CALC-SCNC: 5 MMOL/L — SIGNIFICANT CHANGE UP (ref 5–17)
BUN SERPL-MCNC: 25 MG/DL — HIGH (ref 7–23)
CALCIUM SERPL-MCNC: 8.7 MG/DL — SIGNIFICANT CHANGE UP (ref 8.5–10.1)
CHLORIDE SERPL-SCNC: 109 MMOL/L — HIGH (ref 96–108)
CO2 SERPL-SCNC: 25 MMOL/L — SIGNIFICANT CHANGE UP (ref 22–31)
CREAT SERPL-MCNC: 1.1 MG/DL — SIGNIFICANT CHANGE UP (ref 0.5–1.3)
EGFR: 70 ML/MIN/1.73M2 — SIGNIFICANT CHANGE UP
GLUCOSE BLDC GLUCOMTR-MCNC: 124 MG/DL — HIGH (ref 70–99)
GLUCOSE BLDC GLUCOMTR-MCNC: 146 MG/DL — HIGH (ref 70–99)
GLUCOSE SERPL-MCNC: 149 MG/DL — HIGH (ref 70–99)
HCT VFR BLD CALC: 36.9 % — LOW (ref 39–50)
HGB BLD-MCNC: 12.8 G/DL — LOW (ref 13–17)
MCHC RBC-ENTMCNC: 31.1 PG — SIGNIFICANT CHANGE UP (ref 27–34)
MCHC RBC-ENTMCNC: 34.7 GM/DL — SIGNIFICANT CHANGE UP (ref 32–36)
MCV RBC AUTO: 89.8 FL — SIGNIFICANT CHANGE UP (ref 80–100)
PLATELET # BLD AUTO: 183 K/UL — SIGNIFICANT CHANGE UP (ref 150–400)
POTASSIUM SERPL-MCNC: 4.7 MMOL/L — SIGNIFICANT CHANGE UP (ref 3.5–5.3)
POTASSIUM SERPL-SCNC: 4.7 MMOL/L — SIGNIFICANT CHANGE UP (ref 3.5–5.3)
RBC # BLD: 4.11 M/UL — LOW (ref 4.2–5.8)
RBC # FLD: 14.6 % — HIGH (ref 10.3–14.5)
SODIUM SERPL-SCNC: 139 MMOL/L — SIGNIFICANT CHANGE UP (ref 135–145)
SURGICAL PATHOLOGY STUDY: SIGNIFICANT CHANGE UP
WBC # BLD: 10.54 K/UL — HIGH (ref 3.8–10.5)
WBC # FLD AUTO: 10.54 K/UL — HIGH (ref 3.8–10.5)

## 2024-06-17 PROCEDURE — 87070 CULTURE OTHR SPECIMN AEROBIC: CPT

## 2024-06-17 PROCEDURE — 11981 INSERTION DRUG DLVR IMPLANT: CPT

## 2024-06-17 PROCEDURE — 82962 GLUCOSE BLOOD TEST: CPT

## 2024-06-17 PROCEDURE — 97607 NEG PRS WND THR NDME<=50SQCM: CPT

## 2024-06-17 PROCEDURE — 73560 X-RAY EXAM OF KNEE 1 OR 2: CPT | Mod: 26,RT

## 2024-06-17 PROCEDURE — 80048 BASIC METABOLIC PNL TOTAL CA: CPT

## 2024-06-17 PROCEDURE — 36415 COLL VENOUS BLD VENIPUNCTURE: CPT

## 2024-06-17 PROCEDURE — 97116 GAIT TRAINING THERAPY: CPT | Mod: GP

## 2024-06-17 PROCEDURE — 85027 COMPLETE CBC AUTOMATED: CPT

## 2024-06-17 PROCEDURE — 73700 CT LOWER EXTREMITY W/O DYE: CPT | Mod: 26,RT

## 2024-06-17 PROCEDURE — 73560 X-RAY EXAM OF KNEE 1 OR 2: CPT | Mod: RT

## 2024-06-17 PROCEDURE — 87102 FUNGUS ISOLATION CULTURE: CPT

## 2024-06-17 PROCEDURE — 76376 3D RENDER W/INTRP POSTPROCES: CPT

## 2024-06-17 PROCEDURE — 87075 CULTR BACTERIA EXCEPT BLOOD: CPT

## 2024-06-17 PROCEDURE — 73700 CT LOWER EXTREMITY W/O DYE: CPT | Mod: MC,RT

## 2024-06-17 PROCEDURE — 76376 3D RENDER W/INTRP POSTPROCES: CPT | Mod: 26

## 2024-06-17 PROCEDURE — 97162 PT EVAL MOD COMPLEX 30 MIN: CPT | Mod: GP

## 2024-06-17 PROCEDURE — 88305 TISSUE EXAM BY PATHOLOGIST: CPT

## 2024-06-17 PROCEDURE — 27487 REVISE/REPLACE KNEE JOINT: CPT | Mod: RT

## 2024-06-17 PROCEDURE — C1713: CPT

## 2024-06-17 PROCEDURE — 87116 MYCOBACTERIA CULTURE: CPT

## 2024-06-17 PROCEDURE — 99253 IP/OBS CNSLTJ NEW/EST LOW 45: CPT

## 2024-06-17 PROCEDURE — C1776: CPT

## 2024-06-17 RX ORDER — DEXAMETHASONE 1 MG/1
8 TABLET ORAL ONCE
Refills: 0 | Status: COMPLETED | OUTPATIENT
Start: 2024-06-18 | End: 2024-06-18

## 2024-06-17 RX ORDER — ONDANSETRON HYDROCHLORIDE 2 MG/ML
4 INJECTION INTRAMUSCULAR; INTRAVENOUS ONCE
Refills: 0 | Status: DISCONTINUED | OUTPATIENT
Start: 2024-06-17 | End: 2024-06-17

## 2024-06-17 RX ORDER — OXYCODONE HYDROCHLORIDE 100 MG/5ML
5 SOLUTION ORAL
Refills: 0 | Status: DISCONTINUED | OUTPATIENT
Start: 2024-06-17 | End: 2024-06-18

## 2024-06-17 RX ORDER — METOPROLOL TARTRATE 50 MG
50 TABLET ORAL DAILY
Refills: 0 | Status: DISCONTINUED | OUTPATIENT
Start: 2024-06-17 | End: 2024-06-19

## 2024-06-17 RX ORDER — CELECOXIB 100 MG/1
1 CAPSULE ORAL
Qty: 60 | Refills: 0
Start: 2024-06-17 | End: 2024-07-16

## 2024-06-17 RX ORDER — OXYCODONE HYDROCHLORIDE 100 MG/5ML
1 SOLUTION ORAL
Qty: 30 | Refills: 0
Start: 2024-06-17 | End: 2024-06-21

## 2024-06-17 RX ORDER — ALLOPURINOL 300 MG/1
100 TABLET ORAL DAILY
Refills: 0 | Status: DISCONTINUED | OUTPATIENT
Start: 2024-06-17 | End: 2024-06-19

## 2024-06-17 RX ORDER — ATORVASTATIN CALCIUM 20 MG/1
80 TABLET, FILM COATED ORAL AT BEDTIME
Refills: 0 | Status: DISCONTINUED | OUTPATIENT
Start: 2024-06-17 | End: 2024-06-19

## 2024-06-17 RX ORDER — ASPIRIN 325 MG/1
1 TABLET, FILM COATED ORAL
Qty: 28 | Refills: 0
Start: 2024-06-17 | End: 2024-06-30

## 2024-06-17 RX ORDER — APREPITANT 125MG-80MG
40 KIT ORAL ONCE
Refills: 0 | Status: DISCONTINUED | OUTPATIENT
Start: 2024-06-17 | End: 2024-06-19

## 2024-06-17 RX ORDER — DEXTROSE MONOHYDRATE AND SODIUM CHLORIDE 5; .3 G/100ML; G/100ML
1000 INJECTION, SOLUTION INTRAVENOUS
Refills: 0 | Status: DISCONTINUED | OUTPATIENT
Start: 2024-06-17 | End: 2024-06-17

## 2024-06-17 RX ORDER — POLYETHYLENE GLYCOL 3350 1 G/G
17 POWDER ORAL
Qty: 1 | Refills: 0
Start: 2024-06-17 | End: 2024-06-30

## 2024-06-17 RX ORDER — OXYCODONE HYDROCHLORIDE 100 MG/5ML
5 SOLUTION ORAL ONCE
Refills: 0 | Status: DISCONTINUED | OUTPATIENT
Start: 2024-06-17 | End: 2024-06-17

## 2024-06-17 RX ORDER — CEFADROXIL 500 MG
500 CAPSULE ORAL EVERY 12 HOURS
Refills: 0 | Status: DISCONTINUED | OUTPATIENT
Start: 2024-06-18 | End: 2024-06-19

## 2024-06-17 RX ORDER — RIVAROXABAN 10 MG/1
10 TABLET, FILM COATED ORAL DAILY
Refills: 0 | Status: DISCONTINUED | OUTPATIENT
Start: 2024-06-18 | End: 2024-06-19

## 2024-06-17 RX ORDER — CEFAZOLIN 10 G/1
2000 INJECTION, POWDER, FOR SOLUTION INTRAVENOUS EVERY 8 HOURS
Refills: 0 | Status: COMPLETED | OUTPATIENT
Start: 2024-06-17 | End: 2024-06-18

## 2024-06-17 RX ORDER — RIVAROXABAN 10 MG/1
1 TABLET, FILM COATED ORAL
Qty: 13 | Refills: 0
Start: 2024-06-17 | End: 2024-06-29

## 2024-06-17 RX ORDER — CEFAZOLIN 10 G/1
2000 INJECTION, POWDER, FOR SOLUTION INTRAVENOUS EVERY 8 HOURS
Refills: 0 | Status: DISCONTINUED | OUTPATIENT
Start: 2024-06-17 | End: 2024-06-17

## 2024-06-17 RX ORDER — OXYCODONE HYDROCHLORIDE 100 MG/5ML
10 SOLUTION ORAL
Refills: 0 | Status: DISCONTINUED | OUTPATIENT
Start: 2024-06-17 | End: 2024-06-18

## 2024-06-17 RX ORDER — ONDANSETRON HYDROCHLORIDE 2 MG/ML
1 INJECTION INTRAMUSCULAR; INTRAVENOUS
Qty: 15 | Refills: 0
Start: 2024-06-17 | End: 2024-06-21

## 2024-06-17 RX ORDER — FENTANYL CITRATE 50 UG/ML
50 INJECTION, SOLUTION INTRAMUSCULAR; INTRAVENOUS
Refills: 0 | Status: DISCONTINUED | OUTPATIENT
Start: 2024-06-17 | End: 2024-06-17

## 2024-06-17 RX ORDER — SENNOSIDES 8.6 MG
2 TABLET ORAL
Qty: 28 | Refills: 0
Start: 2024-06-17 | End: 2024-06-30

## 2024-06-17 RX ORDER — UBIDECARENONE 100 MG
1 CAPSULE ORAL
Qty: 0 | Refills: 0 | DISCHARGE

## 2024-06-17 RX ORDER — CEFADROXIL 500 MG
1 CAPSULE ORAL
Qty: 14 | Refills: 0
Start: 2024-06-17 | End: 2024-06-23

## 2024-06-17 RX ORDER — ACETAMINOPHEN 325 MG
2 TABLET ORAL
Qty: 84 | Refills: 0
Start: 2024-06-17 | End: 2024-06-30

## 2024-06-17 RX ORDER — PANTOPRAZOLE SODIUM 40 MG/10ML
1 INJECTION, POWDER, FOR SOLUTION INTRAVENOUS
Qty: 30 | Refills: 0
Start: 2024-06-17 | End: 2024-07-16

## 2024-06-17 RX ORDER — HYDROMORPHONE HCL 0.2 MG/ML
0.5 INJECTION, SOLUTION INTRAVENOUS
Refills: 0 | Status: DISCONTINUED | OUTPATIENT
Start: 2024-06-17 | End: 2024-06-18

## 2024-06-17 RX ORDER — GABAPENTIN
900 POWDER (GRAM) MISCELLANEOUS
Refills: 0 | Status: DISCONTINUED | OUTPATIENT
Start: 2024-06-17 | End: 2024-06-19

## 2024-06-17 RX ADMIN — HYDROMORPHONE HCL 0.5 MILLIGRAM(S): 0.2 INJECTION, SOLUTION INTRAVENOUS at 16:30

## 2024-06-17 RX ADMIN — ATORVASTATIN CALCIUM 80 MILLIGRAM(S): 20 TABLET, FILM COATED ORAL at 22:02

## 2024-06-17 RX ADMIN — Medication 1000 MILLIGRAM(S): at 22:03

## 2024-06-17 RX ADMIN — FENTANYL CITRATE 50 MICROGRAM(S): 50 INJECTION, SOLUTION INTRAMUSCULAR; INTRAVENOUS at 15:57

## 2024-06-17 RX ADMIN — Medication 900 MILLIGRAM(S): at 22:06

## 2024-06-17 RX ADMIN — CELECOXIB 200 MILLIGRAM(S): 100 CAPSULE ORAL at 22:01

## 2024-06-17 RX ADMIN — FENTANYL CITRATE 50 MICROGRAM(S): 50 INJECTION, SOLUTION INTRAMUSCULAR; INTRAVENOUS at 16:15

## 2024-06-17 RX ADMIN — HYDROMORPHONE HCL 0.5 MILLIGRAM(S): 0.2 INJECTION, SOLUTION INTRAVENOUS at 16:17

## 2024-06-17 RX ADMIN — CEFAZOLIN 2000 MILLIGRAM(S): 10 INJECTION, POWDER, FOR SOLUTION INTRAVENOUS at 22:01

## 2024-06-18 ENCOUNTER — TRANSCRIPTION ENCOUNTER (OUTPATIENT)
Age: 74
End: 2024-06-18

## 2024-06-18 LAB
ANION GAP SERPL CALC-SCNC: 6 MMOL/L — SIGNIFICANT CHANGE UP (ref 5–17)
BUN SERPL-MCNC: 28 MG/DL — HIGH (ref 7–23)
CALCIUM SERPL-MCNC: 8.6 MG/DL — SIGNIFICANT CHANGE UP (ref 8.5–10.1)
CHLORIDE SERPL-SCNC: 109 MMOL/L — HIGH (ref 96–108)
CO2 SERPL-SCNC: 25 MMOL/L — SIGNIFICANT CHANGE UP (ref 22–31)
CREAT SERPL-MCNC: 1.05 MG/DL — SIGNIFICANT CHANGE UP (ref 0.5–1.3)
EGFR: 74 ML/MIN/1.73M2 — SIGNIFICANT CHANGE UP
GLUCOSE SERPL-MCNC: 148 MG/DL — HIGH (ref 70–99)
HCT VFR BLD CALC: 33.1 % — LOW (ref 39–50)
HGB BLD-MCNC: 11.3 G/DL — LOW (ref 13–17)
MCHC RBC-ENTMCNC: 30.6 PG — SIGNIFICANT CHANGE UP (ref 27–34)
MCHC RBC-ENTMCNC: 34.1 GM/DL — SIGNIFICANT CHANGE UP (ref 32–36)
MCV RBC AUTO: 89.7 FL — SIGNIFICANT CHANGE UP (ref 80–100)
NIGHT BLUE STAIN TISS: SIGNIFICANT CHANGE UP
PLATELET # BLD AUTO: 178 K/UL — SIGNIFICANT CHANGE UP (ref 150–400)
POTASSIUM SERPL-MCNC: 4.2 MMOL/L — SIGNIFICANT CHANGE UP (ref 3.5–5.3)
POTASSIUM SERPL-SCNC: 4.2 MMOL/L — SIGNIFICANT CHANGE UP (ref 3.5–5.3)
RBC # BLD: 3.69 M/UL — LOW (ref 4.2–5.8)
RBC # FLD: 14.5 % — SIGNIFICANT CHANGE UP (ref 10.3–14.5)
SODIUM SERPL-SCNC: 140 MMOL/L — SIGNIFICANT CHANGE UP (ref 135–145)
SPECIMEN SOURCE: SIGNIFICANT CHANGE UP
WBC # BLD: 22.39 K/UL — HIGH (ref 3.8–10.5)
WBC # FLD AUTO: 22.39 K/UL — HIGH (ref 3.8–10.5)

## 2024-06-18 PROCEDURE — 99232 SBSQ HOSP IP/OBS MODERATE 35: CPT

## 2024-06-18 RX ORDER — TRAMADOL HYDROCHLORIDE 50 MG/1
50 TABLET, FILM COATED ORAL ONCE
Refills: 0 | Status: DISCONTINUED | OUTPATIENT
Start: 2024-06-18 | End: 2024-06-18

## 2024-06-18 RX ORDER — ACETAMINOPHEN 325 MG
1000 TABLET ORAL ONCE
Refills: 0 | Status: DISCONTINUED | OUTPATIENT
Start: 2024-06-18 | End: 2024-06-19

## 2024-06-18 RX ORDER — TRAMADOL HYDROCHLORIDE 50 MG/1
100 TABLET, FILM COATED ORAL EVERY 8 HOURS
Refills: 0 | Status: DISCONTINUED | OUTPATIENT
Start: 2024-06-18 | End: 2024-06-19

## 2024-06-18 RX ORDER — TRAMADOL HYDROCHLORIDE 50 MG/1
50 TABLET, FILM COATED ORAL
Refills: 0 | Status: DISCONTINUED | OUTPATIENT
Start: 2024-06-18 | End: 2024-06-18

## 2024-06-18 RX ORDER — TRAMADOL HYDROCHLORIDE 50 MG/1
1 TABLET, FILM COATED ORAL
Qty: 28 | Refills: 0
Start: 2024-06-18 | End: 2024-06-24

## 2024-06-18 RX ORDER — TRAMADOL HYDROCHLORIDE 50 MG/1
100 TABLET, FILM COATED ORAL
Refills: 0 | Status: DISCONTINUED | OUTPATIENT
Start: 2024-06-18 | End: 2024-06-18

## 2024-06-18 RX ORDER — TRAMADOL HYDROCHLORIDE 50 MG/1
50 TABLET, FILM COATED ORAL EVERY 4 HOURS
Refills: 0 | Status: DISCONTINUED | OUTPATIENT
Start: 2024-06-18 | End: 2024-06-19

## 2024-06-18 RX ADMIN — CELECOXIB 200 MILLIGRAM(S): 100 CAPSULE ORAL at 10:40

## 2024-06-18 RX ADMIN — POLYETHYLENE GLYCOL 3350 17 GRAM(S): 1 POWDER ORAL at 22:16

## 2024-06-18 RX ADMIN — CELECOXIB 200 MILLIGRAM(S): 100 CAPSULE ORAL at 22:44

## 2024-06-18 RX ADMIN — Medication 1000 MILLIGRAM(S): at 14:06

## 2024-06-18 RX ADMIN — DEXAMETHASONE 101.6 MILLIGRAM(S): 1 TABLET ORAL at 06:04

## 2024-06-18 RX ADMIN — TRAMADOL HYDROCHLORIDE 50 MILLIGRAM(S): 50 TABLET, FILM COATED ORAL at 15:06

## 2024-06-18 RX ADMIN — Medication 500 MILLIGRAM(S): at 22:16

## 2024-06-18 RX ADMIN — Medication 1000 MILLIGRAM(S): at 15:06

## 2024-06-18 RX ADMIN — Medication 1000 MILLIGRAM(S): at 22:46

## 2024-06-18 RX ADMIN — CELECOXIB 200 MILLIGRAM(S): 100 CAPSULE ORAL at 22:14

## 2024-06-18 RX ADMIN — ATORVASTATIN CALCIUM 80 MILLIGRAM(S): 20 TABLET, FILM COATED ORAL at 22:14

## 2024-06-18 RX ADMIN — Medication 500 MILLIGRAM(S): at 10:39

## 2024-06-18 RX ADMIN — Medication 500 MILLIGRAM(S): at 22:14

## 2024-06-18 RX ADMIN — TRAMADOL HYDROCHLORIDE 50 MILLIGRAM(S): 50 TABLET, FILM COATED ORAL at 14:06

## 2024-06-18 RX ADMIN — Medication 325 MILLIGRAM(S): at 10:40

## 2024-06-18 RX ADMIN — Medication 900 MILLIGRAM(S): at 22:14

## 2024-06-18 RX ADMIN — Medication 1 TABLET(S): at 10:40

## 2024-06-18 RX ADMIN — RIVAROXABAN 10 MILLIGRAM(S): 10 TABLET, FILM COATED ORAL at 10:40

## 2024-06-18 RX ADMIN — Medication 1 MILLIGRAM(S): at 10:40

## 2024-06-18 RX ADMIN — ALLOPURINOL 100 MILLIGRAM(S): 300 TABLET ORAL at 10:39

## 2024-06-18 RX ADMIN — CEFAZOLIN 2000 MILLIGRAM(S): 10 INJECTION, POWDER, FOR SOLUTION INTRAVENOUS at 06:04

## 2024-06-18 RX ADMIN — Medication 2 TABLET(S): at 22:15

## 2024-06-18 RX ADMIN — Medication 1000 MILLIGRAM(S): at 22:16

## 2024-06-18 RX ADMIN — Medication 900 MILLIGRAM(S): at 10:40

## 2024-06-19 VITALS
RESPIRATION RATE: 18 BRPM | DIASTOLIC BLOOD PRESSURE: 72 MMHG | HEART RATE: 60 BPM | OXYGEN SATURATION: 96 % | TEMPERATURE: 98 F | SYSTOLIC BLOOD PRESSURE: 105 MMHG

## 2024-06-19 LAB
ANION GAP SERPL CALC-SCNC: 3 MMOL/L — LOW (ref 5–17)
BUN SERPL-MCNC: 24 MG/DL — HIGH (ref 7–23)
CALCIUM SERPL-MCNC: 8.5 MG/DL — SIGNIFICANT CHANGE UP (ref 8.5–10.1)
CHLORIDE SERPL-SCNC: 108 MMOL/L — SIGNIFICANT CHANGE UP (ref 96–108)
CO2 SERPL-SCNC: 28 MMOL/L — SIGNIFICANT CHANGE UP (ref 22–31)
CREAT SERPL-MCNC: 0.86 MG/DL — SIGNIFICANT CHANGE UP (ref 0.5–1.3)
EGFR: 90 ML/MIN/1.73M2 — SIGNIFICANT CHANGE UP
GLUCOSE SERPL-MCNC: 122 MG/DL — HIGH (ref 70–99)
HCT VFR BLD CALC: 29 % — LOW (ref 39–50)
HGB BLD-MCNC: 10 G/DL — LOW (ref 13–17)
MCHC RBC-ENTMCNC: 31.3 PG — SIGNIFICANT CHANGE UP (ref 27–34)
MCHC RBC-ENTMCNC: 34.5 GM/DL — SIGNIFICANT CHANGE UP (ref 32–36)
MCV RBC AUTO: 90.6 FL — SIGNIFICANT CHANGE UP (ref 80–100)
PLATELET # BLD AUTO: 158 K/UL — SIGNIFICANT CHANGE UP (ref 150–400)
POTASSIUM SERPL-MCNC: 4.2 MMOL/L — SIGNIFICANT CHANGE UP (ref 3.5–5.3)
POTASSIUM SERPL-SCNC: 4.2 MMOL/L — SIGNIFICANT CHANGE UP (ref 3.5–5.3)
RBC # BLD: 3.2 M/UL — LOW (ref 4.2–5.8)
RBC # FLD: 14.8 % — HIGH (ref 10.3–14.5)
SODIUM SERPL-SCNC: 139 MMOL/L — SIGNIFICANT CHANGE UP (ref 135–145)
WBC # BLD: 17.85 K/UL — HIGH (ref 3.8–10.5)
WBC # FLD AUTO: 17.85 K/UL — HIGH (ref 3.8–10.5)

## 2024-06-19 PROCEDURE — 99232 SBSQ HOSP IP/OBS MODERATE 35: CPT

## 2024-06-19 RX ORDER — CEFADROXIL 500 MG
1 CAPSULE ORAL
Qty: 14 | Refills: 0 | DISCHARGE
Start: 2024-06-19 | End: 2024-06-26

## 2024-06-19 RX ADMIN — Medication 1000 MILLIGRAM(S): at 06:10

## 2024-06-19 RX ADMIN — Medication 1 TABLET(S): at 11:35

## 2024-06-19 RX ADMIN — ALLOPURINOL 100 MILLIGRAM(S): 300 TABLET ORAL at 11:36

## 2024-06-19 RX ADMIN — PANTOPRAZOLE SODIUM 40 MILLIGRAM(S): 40 INJECTION, POWDER, FOR SOLUTION INTRAVENOUS at 05:27

## 2024-06-19 RX ADMIN — CELECOXIB 200 MILLIGRAM(S): 100 CAPSULE ORAL at 11:35

## 2024-06-19 RX ADMIN — Medication 1000 MILLIGRAM(S): at 05:26

## 2024-06-19 RX ADMIN — Medication 325 MILLIGRAM(S): at 11:42

## 2024-06-19 RX ADMIN — Medication 500 MILLIGRAM(S): at 05:27

## 2024-06-19 RX ADMIN — Medication 1 MILLIGRAM(S): at 11:36

## 2024-06-19 RX ADMIN — Medication 500 MILLIGRAM(S): at 11:36

## 2024-06-19 RX ADMIN — Medication 900 MILLIGRAM(S): at 11:35

## 2024-06-19 RX ADMIN — RIVAROXABAN 10 MILLIGRAM(S): 10 TABLET, FILM COATED ORAL at 11:36

## 2024-06-21 LAB — SURGICAL PATHOLOGY STUDY: SIGNIFICANT CHANGE UP

## 2024-06-22 LAB
CULTURE RESULTS: NO GROWTH — SIGNIFICANT CHANGE UP
CULTURE RESULTS: NO GROWTH — SIGNIFICANT CHANGE UP
CULTURE RESULTS: SIGNIFICANT CHANGE UP
SPECIMEN SOURCE: SIGNIFICANT CHANGE UP

## 2024-06-25 NOTE — ED STATDOCS - SOCIAL CONCERNS
After obtaining informed consent, the immunization is given by So Alvarado CMA, tolerated well.     Advised to return to clinic in 2 months for 2nd shingrix injection, verbalized understanding.  
Preventative    -Pap screen completed, swabs collected, patient tolerated well  -External Genitalia: normal external genitalia  -Vaginal Exam: introitus vaginal vault normal with thin white/yellow odorous discharge  -Cervix: pink, symmetrical, OS is normal in color with no discharge is not easily friable  
None

## 2024-06-26 ENCOUNTER — INPATIENT (INPATIENT)
Facility: HOSPITAL | Age: 74
LOS: 1 days | Discharge: HOME HEALTH | DRG: 720 | End: 2024-06-28
Attending: STUDENT IN AN ORGANIZED HEALTH CARE EDUCATION/TRAINING PROGRAM | Admitting: STUDENT IN AN ORGANIZED HEALTH CARE EDUCATION/TRAINING PROGRAM
Payer: OTHER MISCELLANEOUS

## 2024-06-26 ENCOUNTER — NON-APPOINTMENT (OUTPATIENT)
Age: 74
End: 2024-06-26

## 2024-06-26 VITALS — WEIGHT: 240.08 LBS | HEIGHT: 69 IN

## 2024-06-26 DIAGNOSIS — Z96.651 PRESENCE OF RIGHT ARTIFICIAL KNEE JOINT: Chronic | ICD-10-CM

## 2024-06-26 DIAGNOSIS — A41.9 SEPSIS, UNSPECIFIED ORGANISM: ICD-10-CM

## 2024-06-26 DIAGNOSIS — Z98.89 OTHER SPECIFIED POSTPROCEDURAL STATES: Chronic | ICD-10-CM

## 2024-06-26 DIAGNOSIS — Z98.890 OTHER SPECIFIED POSTPROCEDURAL STATES: Chronic | ICD-10-CM

## 2024-06-26 DIAGNOSIS — Z96.652 PRESENCE OF LEFT ARTIFICIAL KNEE JOINT: Chronic | ICD-10-CM

## 2024-06-26 DIAGNOSIS — Z90.49 ACQUIRED ABSENCE OF OTHER SPECIFIED PARTS OF DIGESTIVE TRACT: Chronic | ICD-10-CM

## 2024-06-26 LAB
ADD ON TEST-SPECIMEN IN LAB: SIGNIFICANT CHANGE UP
ALBUMIN SERPL ELPH-MCNC: 3.2 G/DL — LOW (ref 3.3–5)
ALP SERPL-CCNC: 94 U/L — SIGNIFICANT CHANGE UP (ref 40–120)
ALT FLD-CCNC: 37 U/L — SIGNIFICANT CHANGE UP (ref 12–78)
ANION GAP SERPL CALC-SCNC: 6 MMOL/L — SIGNIFICANT CHANGE UP (ref 5–17)
APPEARANCE UR: CLEAR — SIGNIFICANT CHANGE UP
APTT BLD: 38.5 SEC — HIGH (ref 24.5–35.6)
AST SERPL-CCNC: 36 U/L — SIGNIFICANT CHANGE UP (ref 15–37)
BASOPHILS # BLD AUTO: 0.03 K/UL — SIGNIFICANT CHANGE UP (ref 0–0.2)
BASOPHILS NFR BLD AUTO: 0.2 % — SIGNIFICANT CHANGE UP (ref 0–2)
BILIRUB SERPL-MCNC: 2.9 MG/DL — HIGH (ref 0.2–1.2)
BILIRUB UR-MCNC: NEGATIVE — SIGNIFICANT CHANGE UP
BUN SERPL-MCNC: 36 MG/DL — HIGH (ref 7–23)
CALCIUM SERPL-MCNC: 9.2 MG/DL — SIGNIFICANT CHANGE UP (ref 8.5–10.1)
CHLORIDE SERPL-SCNC: 106 MMOL/L — SIGNIFICANT CHANGE UP (ref 96–108)
CO2 SERPL-SCNC: 27 MMOL/L — SIGNIFICANT CHANGE UP (ref 22–31)
COLOR SPEC: YELLOW — SIGNIFICANT CHANGE UP
CREAT SERPL-MCNC: 1.31 MG/DL — HIGH (ref 0.5–1.3)
CRP SERPL-MCNC: 59 MG/L — HIGH
CRP SERPL-MCNC: 66 MG/L — HIGH
DIFF PNL FLD: NEGATIVE — SIGNIFICANT CHANGE UP
EGFR: 57 ML/MIN/1.73M2 — LOW
EOSINOPHIL # BLD AUTO: 0.05 K/UL — SIGNIFICANT CHANGE UP (ref 0–0.5)
EOSINOPHIL NFR BLD AUTO: 0.4 % — SIGNIFICANT CHANGE UP (ref 0–6)
ERYTHROCYTE [SEDIMENTATION RATE] IN BLOOD: 83 MM/HR — HIGH (ref 0–20)
FLUAV AG NPH QL: SIGNIFICANT CHANGE UP
FLUBV AG NPH QL: SIGNIFICANT CHANGE UP
GLUCOSE SERPL-MCNC: 193 MG/DL — HIGH (ref 70–99)
GLUCOSE UR QL: NEGATIVE MG/DL — SIGNIFICANT CHANGE UP
HCT VFR BLD CALC: 29.6 % — LOW (ref 39–50)
HGB BLD-MCNC: 10 G/DL — LOW (ref 13–17)
IMM GRANULOCYTES NFR BLD AUTO: 0.8 % — SIGNIFICANT CHANGE UP (ref 0–0.9)
INR BLD: 1.69 RATIO — HIGH (ref 0.85–1.18)
KETONES UR-MCNC: NEGATIVE MG/DL — SIGNIFICANT CHANGE UP
LACTATE SERPL-SCNC: 2.1 MMOL/L — HIGH (ref 0.7–2)
LACTATE SERPL-SCNC: 2.7 MMOL/L — HIGH (ref 0.7–2)
LEUKOCYTE ESTERASE UR-ACNC: NEGATIVE — SIGNIFICANT CHANGE UP
LYMPHOCYTES # BLD AUTO: 0.52 K/UL — LOW (ref 1–3.3)
LYMPHOCYTES # BLD AUTO: 3.8 % — LOW (ref 13–44)
MCHC RBC-ENTMCNC: 31 PG — SIGNIFICANT CHANGE UP (ref 27–34)
MCHC RBC-ENTMCNC: 33.8 GM/DL — SIGNIFICANT CHANGE UP (ref 32–36)
MCV RBC AUTO: 91.6 FL — SIGNIFICANT CHANGE UP (ref 80–100)
MONOCYTES # BLD AUTO: 0.98 K/UL — HIGH (ref 0–0.9)
MONOCYTES NFR BLD AUTO: 7.1 % — SIGNIFICANT CHANGE UP (ref 2–14)
NEUTROPHILS # BLD AUTO: 12.09 K/UL — HIGH (ref 1.8–7.4)
NEUTROPHILS NFR BLD AUTO: 87.7 % — HIGH (ref 43–77)
NITRITE UR-MCNC: NEGATIVE — SIGNIFICANT CHANGE UP
PH UR: 5.5 — SIGNIFICANT CHANGE UP (ref 5–8)
PLATELET # BLD AUTO: 257 K/UL — SIGNIFICANT CHANGE UP (ref 150–400)
POTASSIUM SERPL-MCNC: 4.2 MMOL/L — SIGNIFICANT CHANGE UP (ref 3.5–5.3)
POTASSIUM SERPL-SCNC: 4.2 MMOL/L — SIGNIFICANT CHANGE UP (ref 3.5–5.3)
PROT SERPL-MCNC: 7 GM/DL — SIGNIFICANT CHANGE UP (ref 6–8.3)
PROT UR-MCNC: NEGATIVE MG/DL — SIGNIFICANT CHANGE UP
PROTHROM AB SERPL-ACNC: 18.8 SEC — HIGH (ref 9.5–13)
RBC # BLD: 3.23 M/UL — LOW (ref 4.2–5.8)
RBC # FLD: 15.4 % — HIGH (ref 10.3–14.5)
RSV RNA NPH QL NAA+NON-PROBE: SIGNIFICANT CHANGE UP
SARS-COV-2 RNA SPEC QL NAA+PROBE: SIGNIFICANT CHANGE UP
SODIUM SERPL-SCNC: 139 MMOL/L — SIGNIFICANT CHANGE UP (ref 135–145)
SP GR SPEC: 1.02 — SIGNIFICANT CHANGE UP (ref 1–1.03)
TROPONIN I, HIGH SENSITIVITY RESULT: 13.44 NG/L — SIGNIFICANT CHANGE UP
UROBILINOGEN FLD QL: 1 MG/DL — SIGNIFICANT CHANGE UP (ref 0.2–1)
WBC # BLD: 13.78 K/UL — HIGH (ref 3.8–10.5)
WBC # FLD AUTO: 13.78 K/UL — HIGH (ref 3.8–10.5)

## 2024-06-26 PROCEDURE — 99221 1ST HOSP IP/OBS SF/LOW 40: CPT

## 2024-06-26 PROCEDURE — 93971 EXTREMITY STUDY: CPT | Mod: 26,RT

## 2024-06-26 PROCEDURE — 93010 ELECTROCARDIOGRAM REPORT: CPT

## 2024-06-26 PROCEDURE — 97162 PT EVAL MOD COMPLEX 30 MIN: CPT | Mod: GP

## 2024-06-26 PROCEDURE — 85027 COMPLETE CBC AUTOMATED: CPT

## 2024-06-26 PROCEDURE — 80048 BASIC METABOLIC PNL TOTAL CA: CPT

## 2024-06-26 PROCEDURE — 36415 COLL VENOUS BLD VENIPUNCTURE: CPT

## 2024-06-26 PROCEDURE — 71045 X-RAY EXAM CHEST 1 VIEW: CPT | Mod: 26

## 2024-06-26 PROCEDURE — 87468 ANAPLSMA PHGCYTOPHLM AMP PRB: CPT

## 2024-06-26 PROCEDURE — 80053 COMPREHEN METABOLIC PANEL: CPT

## 2024-06-26 PROCEDURE — 73562 X-RAY EXAM OF KNEE 3: CPT | Mod: 26,RT

## 2024-06-26 PROCEDURE — 85025 COMPLETE CBC W/AUTO DIFF WBC: CPT

## 2024-06-26 PROCEDURE — 99285 EMERGENCY DEPT VISIT HI MDM: CPT

## 2024-06-26 PROCEDURE — 87484 EHRLICHA CHAFFEENSIS AMP PRB: CPT

## 2024-06-26 PROCEDURE — 74177 CT ABD & PELVIS W/CONTRAST: CPT | Mod: MC

## 2024-06-26 PROCEDURE — 71260 CT THORAX DX C+: CPT | Mod: MC

## 2024-06-26 PROCEDURE — 86618 LYME DISEASE ANTIBODY: CPT

## 2024-06-26 PROCEDURE — 71045 X-RAY EXAM CHEST 1 VIEW: CPT

## 2024-06-26 PROCEDURE — 83735 ASSAY OF MAGNESIUM: CPT

## 2024-06-26 PROCEDURE — 0241U: CPT

## 2024-06-26 PROCEDURE — 97116 GAIT TRAINING THERAPY: CPT | Mod: GP

## 2024-06-26 PROCEDURE — 83605 ASSAY OF LACTIC ACID: CPT

## 2024-06-26 PROCEDURE — 87798 DETECT AGENT NOS DNA AMP: CPT

## 2024-06-26 RX ORDER — ASPIRIN 325 MG/1
0 TABLET, FILM COATED ORAL
Qty: 0 | Refills: 0 | DISCHARGE

## 2024-06-26 RX ORDER — VITAMIN E 100 UNIT
0 CAPSULE ORAL
Refills: 0 | DISCHARGE

## 2024-06-26 RX ORDER — SODIUM CHLORIDE 0.9 % (FLUSH) 0.9 %
1000 SYRINGE (ML) INJECTION ONCE
Refills: 0 | Status: COMPLETED | OUTPATIENT
Start: 2024-06-26 | End: 2024-06-26

## 2024-06-26 RX ORDER — ROSUVASTATIN CALCIUM 5 MG/1
1 TABLET ORAL
Refills: 0 | DISCHARGE

## 2024-06-26 RX ORDER — CEFAZOLIN 10 G/1
2000 INJECTION, POWDER, FOR SOLUTION INTRAVENOUS ONCE
Refills: 0 | Status: COMPLETED | OUTPATIENT
Start: 2024-06-26 | End: 2024-06-26

## 2024-06-26 RX ORDER — CEFAZOLIN 10 G/1
2000 INJECTION, POWDER, FOR SOLUTION INTRAVENOUS ONCE
Refills: 0 | Status: DISCONTINUED | OUTPATIENT
Start: 2024-06-26 | End: 2024-06-26

## 2024-06-26 RX ORDER — SODIUM CHLORIDE 0.9 % (FLUSH) 0.9 %
2000 SYRINGE (ML) INJECTION ONCE
Refills: 0 | Status: COMPLETED | OUTPATIENT
Start: 2024-06-26 | End: 2024-06-26

## 2024-06-26 RX ORDER — DEXTROSE MONOHYDRATE AND SODIUM CHLORIDE 5; .3 G/100ML; G/100ML
1000 INJECTION, SOLUTION INTRAVENOUS ONCE
Refills: 0 | Status: COMPLETED | OUTPATIENT
Start: 2024-06-26 | End: 2024-06-26

## 2024-06-26 RX ADMIN — DEXTROSE MONOHYDRATE AND SODIUM CHLORIDE 2000 MILLILITER(S): 5; .3 INJECTION, SOLUTION INTRAVENOUS at 13:30

## 2024-06-26 RX ADMIN — Medication 2000 MILLILITER(S): at 13:10

## 2024-06-26 RX ADMIN — CEFAZOLIN 2000 MILLIGRAM(S): 10 INJECTION, POWDER, FOR SOLUTION INTRAVENOUS at 13:11

## 2024-06-26 RX ADMIN — Medication 2000 MILLILITER(S): at 12:00

## 2024-06-26 RX ADMIN — Medication 1000 MILLILITER(S): at 22:57

## 2024-06-27 DIAGNOSIS — G47.33 OBSTRUCTIVE SLEEP APNEA (ADULT) (PEDIATRIC): ICD-10-CM
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
## 2024-06-27 DIAGNOSIS — Z86.16 PERSONAL HISTORY OF COVID-19: ICD-10-CM

## 2024-06-27 DIAGNOSIS — Z96.652 PRESENCE OF LEFT ARTIFICIAL KNEE JOINT: ICD-10-CM

## 2024-06-27 DIAGNOSIS — M65.9 SYNOVITIS AND TENOSYNOVITIS, UNSPECIFIED: ICD-10-CM

## 2024-06-27 DIAGNOSIS — M97.11XA PERIPROSTHETIC FRACTURE AROUND INTERNAL PROSTHETIC RIGHT KNEE JOINT, INITIAL ENCOUNTER: ICD-10-CM

## 2024-06-27 DIAGNOSIS — E78.5 HYPERLIPIDEMIA, UNSPECIFIED: ICD-10-CM

## 2024-06-27 DIAGNOSIS — E66.9 OBESITY, UNSPECIFIED: ICD-10-CM
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
## 2024-06-27 DIAGNOSIS — N20.0 CALCULUS OF KIDNEY: ICD-10-CM

## 2024-06-27 DIAGNOSIS — I10 ESSENTIAL (PRIMARY) HYPERTENSION: ICD-10-CM
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
## 2024-06-27 DIAGNOSIS — I77.9 DISORDER OF ARTERIES AND ARTERIOLES, UNSPECIFIED: ICD-10-CM

## 2024-06-27 DIAGNOSIS — M48.00 SPINAL STENOSIS, SITE UNSPECIFIED: ICD-10-CM

## 2024-06-27 DIAGNOSIS — T84.032A MECHANICAL LOOSENING OF INTERNAL RIGHT KNEE PROSTHETIC JOINT, INITIAL ENCOUNTER: ICD-10-CM

## 2024-06-27 LAB
ALBUMIN SERPL ELPH-MCNC: 2.8 G/DL — LOW (ref 3.3–5)
ALP SERPL-CCNC: 75 U/L — SIGNIFICANT CHANGE UP (ref 40–120)
ALT FLD-CCNC: 32 U/L — SIGNIFICANT CHANGE UP (ref 12–78)
ANION GAP SERPL CALC-SCNC: 6 MMOL/L — SIGNIFICANT CHANGE UP (ref 5–17)
AST SERPL-CCNC: 45 U/L — HIGH (ref 15–37)
B MICROTI DNA BLD QL NAA+PROBE: SIGNIFICANT CHANGE UP
BILIRUB SERPL-MCNC: 2 MG/DL — HIGH (ref 0.2–1.2)
BUN SERPL-MCNC: 22 MG/DL — SIGNIFICANT CHANGE UP (ref 7–23)
CALCIUM SERPL-MCNC: 8.8 MG/DL — SIGNIFICANT CHANGE UP (ref 8.5–10.1)
CHLORIDE SERPL-SCNC: 107 MMOL/L — SIGNIFICANT CHANGE UP (ref 96–108)
CO2 SERPL-SCNC: 25 MMOL/L — SIGNIFICANT CHANGE UP (ref 22–31)
CREAT SERPL-MCNC: 0.86 MG/DL — SIGNIFICANT CHANGE UP (ref 0.5–1.3)
EGFR: 90 ML/MIN/1.73M2 — SIGNIFICANT CHANGE UP
GLUCOSE SERPL-MCNC: 123 MG/DL — HIGH (ref 70–99)
HCT VFR BLD CALC: 26.2 % — LOW (ref 39–50)
HGB BLD-MCNC: 8.8 G/DL — LOW (ref 13–17)
LACTATE SERPL-SCNC: 1.1 MMOL/L — SIGNIFICANT CHANGE UP (ref 0.7–2)
MAGNESIUM SERPL-MCNC: 2 MG/DL — SIGNIFICANT CHANGE UP (ref 1.6–2.6)
MCHC RBC-ENTMCNC: 30.8 PG — SIGNIFICANT CHANGE UP (ref 27–34)
MCHC RBC-ENTMCNC: 33.6 GM/DL — SIGNIFICANT CHANGE UP (ref 32–36)
MCV RBC AUTO: 91.6 FL — SIGNIFICANT CHANGE UP (ref 80–100)
PLATELET # BLD AUTO: 219 K/UL — SIGNIFICANT CHANGE UP (ref 150–400)
POTASSIUM SERPL-MCNC: 3.9 MMOL/L — SIGNIFICANT CHANGE UP (ref 3.5–5.3)
POTASSIUM SERPL-SCNC: 3.9 MMOL/L — SIGNIFICANT CHANGE UP (ref 3.5–5.3)
PROT SERPL-MCNC: 6.1 GM/DL — SIGNIFICANT CHANGE UP (ref 6–8.3)
RBC # BLD: 2.86 M/UL — LOW (ref 4.2–5.8)
RBC # FLD: 15.5 % — HIGH (ref 10.3–14.5)
SODIUM SERPL-SCNC: 138 MMOL/L — SIGNIFICANT CHANGE UP (ref 135–145)
WBC # BLD: 10.09 K/UL — SIGNIFICANT CHANGE UP (ref 3.8–10.5)
WBC # FLD AUTO: 10.09 K/UL — SIGNIFICANT CHANGE UP (ref 3.8–10.5)

## 2024-06-27 PROCEDURE — 99223 1ST HOSP IP/OBS HIGH 75: CPT

## 2024-06-27 PROCEDURE — 74177 CT ABD & PELVIS W/CONTRAST: CPT | Mod: 26

## 2024-06-27 PROCEDURE — 99497 ADVNCD CARE PLAN 30 MIN: CPT | Mod: 25

## 2024-06-27 PROCEDURE — 71260 CT THORAX DX C+: CPT | Mod: 26

## 2024-06-27 RX ORDER — CEFAZOLIN 10 G/1
2000 INJECTION, POWDER, FOR SOLUTION INTRAVENOUS ONCE
Refills: 0 | Status: DISCONTINUED | OUTPATIENT
Start: 2024-06-27 | End: 2024-06-27

## 2024-06-27 RX ORDER — TRAMADOL HYDROCHLORIDE 50 MG/1
50 TABLET, FILM COATED ORAL EVERY 6 HOURS
Refills: 0 | Status: DISCONTINUED | OUTPATIENT
Start: 2024-06-27 | End: 2024-06-28

## 2024-06-27 RX ORDER — DEXTROSE MONOHYDRATE AND SODIUM CHLORIDE 5; .3 G/100ML; G/100ML
1000 INJECTION, SOLUTION INTRAVENOUS
Refills: 0 | Status: DISCONTINUED | OUTPATIENT
Start: 2024-06-27 | End: 2024-06-27

## 2024-06-27 RX ORDER — RIVAROXABAN 10 MG/1
10 TABLET, FILM COATED ORAL DAILY
Refills: 0 | Status: DISCONTINUED | OUTPATIENT
Start: 2024-06-27 | End: 2024-06-28

## 2024-06-27 RX ORDER — SENNOSIDES 8.6 MG
2 TABLET ORAL AT BEDTIME
Refills: 0 | Status: DISCONTINUED | OUTPATIENT
Start: 2024-06-27 | End: 2024-06-28

## 2024-06-27 RX ORDER — PANTOPRAZOLE SODIUM 40 MG/10ML
40 INJECTION, POWDER, FOR SOLUTION INTRAVENOUS
Refills: 0 | Status: DISCONTINUED | OUTPATIENT
Start: 2024-06-27 | End: 2024-06-28

## 2024-06-27 RX ORDER — CEFAZOLIN 10 G/1
INJECTION, POWDER, FOR SOLUTION INTRAVENOUS
Refills: 0 | Status: DISCONTINUED | OUTPATIENT
Start: 2024-06-27 | End: 2024-06-27

## 2024-06-27 RX ORDER — GABAPENTIN
900 POWDER (GRAM) MISCELLANEOUS
Refills: 0 | Status: DISCONTINUED | OUTPATIENT
Start: 2024-06-27 | End: 2024-06-28

## 2024-06-27 RX ORDER — CEFAZOLIN 10 G/1
2000 INJECTION, POWDER, FOR SOLUTION INTRAVENOUS EVERY 8 HOURS
Refills: 0 | Status: DISCONTINUED | OUTPATIENT
Start: 2024-06-27 | End: 2024-06-27

## 2024-06-27 RX ORDER — ATORVASTATIN CALCIUM 20 MG/1
40 TABLET, FILM COATED ORAL AT BEDTIME
Refills: 0 | Status: DISCONTINUED | OUTPATIENT
Start: 2024-06-27 | End: 2024-06-28

## 2024-06-27 RX ORDER — ALLOPURINOL 300 MG/1
100 TABLET ORAL DAILY
Refills: 0 | Status: DISCONTINUED | OUTPATIENT
Start: 2024-06-27 | End: 2024-06-28

## 2024-06-27 RX ORDER — ASPIRIN 325 MG/1
81 TABLET, FILM COATED ORAL DAILY
Refills: 0 | Status: DISCONTINUED | OUTPATIENT
Start: 2024-06-27 | End: 2024-06-28

## 2024-06-27 RX ORDER — CEFEPIME 1 G/1
2000 INJECTION, POWDER, FOR SOLUTION INTRAMUSCULAR; INTRAVENOUS EVERY 12 HOURS
Refills: 0 | Status: DISCONTINUED | OUTPATIENT
Start: 2024-06-27 | End: 2024-06-27

## 2024-06-27 RX ORDER — POLYETHYLENE GLYCOL 3350 1 G/G
17 POWDER ORAL DAILY
Refills: 0 | Status: DISCONTINUED | OUTPATIENT
Start: 2024-06-27 | End: 2024-06-28

## 2024-06-27 RX ORDER — CEFEPIME 1 G/1
2000 INJECTION, POWDER, FOR SOLUTION INTRAMUSCULAR; INTRAVENOUS EVERY 12 HOURS
Refills: 0 | Status: DISCONTINUED | OUTPATIENT
Start: 2024-06-27 | End: 2024-06-28

## 2024-06-27 RX ADMIN — DEXTROSE MONOHYDRATE AND SODIUM CHLORIDE 100 MILLILITER(S): 5; .3 INJECTION, SOLUTION INTRAVENOUS at 09:18

## 2024-06-27 RX ADMIN — CEFAZOLIN 2000 MILLIGRAM(S): 10 INJECTION, POWDER, FOR SOLUTION INTRAVENOUS at 09:17

## 2024-06-27 RX ADMIN — PANTOPRAZOLE SODIUM 40 MILLIGRAM(S): 40 INJECTION, POWDER, FOR SOLUTION INTRAVENOUS at 05:09

## 2024-06-27 RX ADMIN — RIVAROXABAN 10 MILLIGRAM(S): 10 TABLET, FILM COATED ORAL at 09:18

## 2024-06-27 RX ADMIN — ATORVASTATIN CALCIUM 40 MILLIGRAM(S): 20 TABLET, FILM COATED ORAL at 21:44

## 2024-06-27 RX ADMIN — CEFEPIME 2000 MILLIGRAM(S): 1 INJECTION, POWDER, FOR SOLUTION INTRAMUSCULAR; INTRAVENOUS at 21:49

## 2024-06-27 RX ADMIN — Medication 900 MILLIGRAM(S): at 09:17

## 2024-06-27 RX ADMIN — CEFAZOLIN 2000 MILLIGRAM(S): 10 INJECTION, POWDER, FOR SOLUTION INTRAVENOUS at 05:09

## 2024-06-27 RX ADMIN — ALLOPURINOL 100 MILLIGRAM(S): 300 TABLET ORAL at 09:18

## 2024-06-27 RX ADMIN — DEXTROSE MONOHYDRATE AND SODIUM CHLORIDE 100 MILLILITER(S): 5; .3 INJECTION, SOLUTION INTRAVENOUS at 05:11

## 2024-06-27 RX ADMIN — Medication 900 MILLIGRAM(S): at 21:44

## 2024-06-27 RX ADMIN — ASPIRIN 81 MILLIGRAM(S): 325 TABLET, FILM COATED ORAL at 09:18

## 2024-06-28 ENCOUNTER — TRANSCRIPTION ENCOUNTER (OUTPATIENT)
Age: 74
End: 2024-06-28

## 2024-06-28 VITALS
HEART RATE: 79 BPM | TEMPERATURE: 99 F | DIASTOLIC BLOOD PRESSURE: 60 MMHG | OXYGEN SATURATION: 95 % | RESPIRATION RATE: 18 BRPM | SYSTOLIC BLOOD PRESSURE: 107 MMHG

## 2024-06-28 LAB
ANION GAP SERPL CALC-SCNC: 8 MMOL/L — SIGNIFICANT CHANGE UP (ref 5–17)
B BURGDOR C6 AB SER-ACNC: NEGATIVE — SIGNIFICANT CHANGE UP
B BURGDOR IGG+IGM SER QL IB: SIGNIFICANT CHANGE UP
B BURGDOR IGG+IGM SER-ACNC: 0.17 INDEX — SIGNIFICANT CHANGE UP (ref 0.01–0.89)
BASOPHILS # BLD AUTO: 0.04 K/UL — SIGNIFICANT CHANGE UP (ref 0–0.2)
BASOPHILS NFR BLD AUTO: 0.4 % — SIGNIFICANT CHANGE UP (ref 0–2)
BUN SERPL-MCNC: 17 MG/DL — SIGNIFICANT CHANGE UP (ref 7–23)
CALCIUM SERPL-MCNC: 9.1 MG/DL — SIGNIFICANT CHANGE UP (ref 8.5–10.1)
CHLORIDE SERPL-SCNC: 106 MMOL/L — SIGNIFICANT CHANGE UP (ref 96–108)
CO2 SERPL-SCNC: 24 MMOL/L — SIGNIFICANT CHANGE UP (ref 22–31)
CREAT SERPL-MCNC: 0.92 MG/DL — SIGNIFICANT CHANGE UP (ref 0.5–1.3)
EGFR: 87 ML/MIN/1.73M2 — SIGNIFICANT CHANGE UP
EOSINOPHIL # BLD AUTO: 0.29 K/UL — SIGNIFICANT CHANGE UP (ref 0–0.5)
EOSINOPHIL NFR BLD AUTO: 3.2 % — SIGNIFICANT CHANGE UP (ref 0–6)
GLUCOSE SERPL-MCNC: 147 MG/DL — HIGH (ref 70–99)
HCT VFR BLD CALC: 29.3 % — LOW (ref 39–50)
HGB BLD-MCNC: 9.7 G/DL — LOW (ref 13–17)
IMM GRANULOCYTES NFR BLD AUTO: 1 % — HIGH (ref 0–0.9)
LYMPHOCYTES # BLD AUTO: 1.76 K/UL — SIGNIFICANT CHANGE UP (ref 1–3.3)
LYMPHOCYTES # BLD AUTO: 19.3 % — SIGNIFICANT CHANGE UP (ref 13–44)
MCHC RBC-ENTMCNC: 30.7 PG — SIGNIFICANT CHANGE UP (ref 27–34)
MCHC RBC-ENTMCNC: 33.1 GM/DL — SIGNIFICANT CHANGE UP (ref 32–36)
MCV RBC AUTO: 92.7 FL — SIGNIFICANT CHANGE UP (ref 80–100)
MONOCYTES # BLD AUTO: 0.89 K/UL — SIGNIFICANT CHANGE UP (ref 0–0.9)
MONOCYTES NFR BLD AUTO: 9.7 % — SIGNIFICANT CHANGE UP (ref 2–14)
NEUTROPHILS # BLD AUTO: 6.06 K/UL — SIGNIFICANT CHANGE UP (ref 1.8–7.4)
NEUTROPHILS NFR BLD AUTO: 66.4 % — SIGNIFICANT CHANGE UP (ref 43–77)
PLATELET # BLD AUTO: 249 K/UL — SIGNIFICANT CHANGE UP (ref 150–400)
POTASSIUM SERPL-MCNC: 3.8 MMOL/L — SIGNIFICANT CHANGE UP (ref 3.5–5.3)
POTASSIUM SERPL-SCNC: 3.8 MMOL/L — SIGNIFICANT CHANGE UP (ref 3.5–5.3)
RBC # BLD: 3.16 M/UL — LOW (ref 4.2–5.8)
RBC # FLD: 15.7 % — HIGH (ref 10.3–14.5)
SODIUM SERPL-SCNC: 138 MMOL/L — SIGNIFICANT CHANGE UP (ref 135–145)
WBC # BLD: 9.13 K/UL — SIGNIFICANT CHANGE UP (ref 3.8–10.5)
WBC # FLD AUTO: 9.13 K/UL — SIGNIFICANT CHANGE UP (ref 3.8–10.5)

## 2024-06-28 PROCEDURE — 99239 HOSP IP/OBS DSCHRG MGMT >30: CPT

## 2024-06-28 RX ORDER — CEFUROXIME SODIUM 7.5 G
1 VIAL (EA) INTRAVENOUS
Qty: 14 | Refills: 0
Start: 2024-06-28 | End: 2024-07-04

## 2024-06-28 RX ADMIN — ASPIRIN 81 MILLIGRAM(S): 325 TABLET, FILM COATED ORAL at 09:23

## 2024-06-28 RX ADMIN — PANTOPRAZOLE SODIUM 40 MILLIGRAM(S): 40 INJECTION, POWDER, FOR SOLUTION INTRAVENOUS at 06:12

## 2024-06-28 RX ADMIN — Medication 900 MILLIGRAM(S): at 09:22

## 2024-06-28 RX ADMIN — CEFEPIME 2000 MILLIGRAM(S): 1 INJECTION, POWDER, FOR SOLUTION INTRAMUSCULAR; INTRAVENOUS at 09:22

## 2024-06-28 RX ADMIN — RIVAROXABAN 10 MILLIGRAM(S): 10 TABLET, FILM COATED ORAL at 09:22

## 2024-06-28 RX ADMIN — ALLOPURINOL 100 MILLIGRAM(S): 300 TABLET ORAL at 09:22

## 2024-06-30 LAB
A PHAGOCYTOPH DNA BLD QL NAA+PROBE: NEGATIVE — SIGNIFICANT CHANGE UP
E CHAFFEENSIS DNA BLD QL NAA+PROBE: NEGATIVE — SIGNIFICANT CHANGE UP
E EWINGII DNA SPEC QL NAA+PROBE: NEGATIVE — SIGNIFICANT CHANGE UP
EHRLICHIA DNA SPEC QL NAA+PROBE: NEGATIVE — SIGNIFICANT CHANGE UP

## 2024-07-01 ENCOUNTER — APPOINTMENT (OUTPATIENT)
Dept: ORTHOPEDIC SURGERY | Facility: CLINIC | Age: 74
End: 2024-07-01
Payer: OTHER MISCELLANEOUS

## 2024-07-01 DIAGNOSIS — T84.032A MECHANICAL LOOSENING OF INTERNAL RIGHT KNEE PROSTHETIC JOINT, INITIAL ENCOUNTER: ICD-10-CM

## 2024-07-01 PROBLEM — Z96.651 STATUS POST REVISION OF TOTAL REPLACEMENT OF RIGHT KNEE: Status: ACTIVE | Noted: 2024-07-01

## 2024-07-01 PROCEDURE — 99024 POSTOP FOLLOW-UP VISIT: CPT

## 2024-07-02 RX ORDER — ASPIRIN 325 MG/1
1 TABLET, FILM COATED ORAL
Qty: 28 | Refills: 0 | DISCHARGE
Start: 2024-07-02

## 2024-07-05 DIAGNOSIS — Z96.653 PRESENCE OF ARTIFICIAL KNEE JOINT, BILATERAL: ICD-10-CM

## 2024-07-05 DIAGNOSIS — Z90.49 ACQUIRED ABSENCE OF OTHER SPECIFIED PARTS OF DIGESTIVE TRACT: ICD-10-CM

## 2024-07-05 DIAGNOSIS — N17.9 ACUTE KIDNEY FAILURE, UNSPECIFIED: ICD-10-CM

## 2024-07-05 DIAGNOSIS — D64.9 ANEMIA, UNSPECIFIED: ICD-10-CM

## 2024-07-05 DIAGNOSIS — I95.9 HYPOTENSION, UNSPECIFIED: ICD-10-CM

## 2024-07-05 DIAGNOSIS — G47.33 OBSTRUCTIVE SLEEP APNEA (ADULT) (PEDIATRIC): ICD-10-CM

## 2024-07-05 DIAGNOSIS — R50.9 FEVER, UNSPECIFIED: ICD-10-CM

## 2024-07-05 DIAGNOSIS — E86.1 HYPOVOLEMIA: ICD-10-CM

## 2024-07-05 DIAGNOSIS — E66.9 OBESITY, UNSPECIFIED: ICD-10-CM

## 2024-07-05 DIAGNOSIS — Z99.89 DEPENDENCE ON OTHER ENABLING MACHINES AND DEVICES: ICD-10-CM

## 2024-07-05 DIAGNOSIS — Z79.82 LONG TERM (CURRENT) USE OF ASPIRIN: ICD-10-CM

## 2024-07-05 DIAGNOSIS — N39.0 URINARY TRACT INFECTION, SITE NOT SPECIFIED: ICD-10-CM

## 2024-07-05 DIAGNOSIS — E87.20 ACIDOSIS, UNSPECIFIED: ICD-10-CM

## 2024-07-16 ENCOUNTER — APPOINTMENT (OUTPATIENT)
Dept: ORTHOPEDIC SURGERY | Facility: CLINIC | Age: 74
End: 2024-07-16
Payer: OTHER MISCELLANEOUS

## 2024-07-16 DIAGNOSIS — Z96.651 PRESENCE OF RIGHT ARTIFICIAL KNEE JOINT: ICD-10-CM

## 2024-07-16 PROCEDURE — 73562 X-RAY EXAM OF KNEE 3: CPT | Mod: RT

## 2024-07-16 PROCEDURE — 99024 POSTOP FOLLOW-UP VISIT: CPT

## 2024-07-17 LAB
CULTURE RESULTS: SIGNIFICANT CHANGE UP
SPECIMEN SOURCE: SIGNIFICANT CHANGE UP

## 2024-07-25 NOTE — BRIEF OPERATIVE NOTE - TYPE OF ANESTHESIA
Forms/Letter Request    Type of form/letter: Disability      Do we have the form/letter: Yes: placed in care team 2 providers form folder    Who is the form from? Insurance comp    Where did/will the form come from? form was faxed in    When is form/letter needed by: by 8/2/2024    How would you like the form/letter returned: Fax : [phone]    
General
General

## 2024-07-29 ENCOUNTER — NON-APPOINTMENT (OUTPATIENT)
Age: 74
End: 2024-07-29

## 2024-07-30 ENCOUNTER — APPOINTMENT (OUTPATIENT)
Dept: ORTHOPEDIC SURGERY | Facility: CLINIC | Age: 74
End: 2024-07-30
Payer: COMMERCIAL

## 2024-07-30 DIAGNOSIS — Z96.651 PRESENCE OF RIGHT ARTIFICIAL KNEE JOINT: ICD-10-CM

## 2024-07-30 PROCEDURE — 73562 X-RAY EXAM OF KNEE 3: CPT | Mod: RT

## 2024-07-30 PROCEDURE — 99024 POSTOP FOLLOW-UP VISIT: CPT

## 2024-07-30 NOTE — HISTORY OF PRESENT ILLNESS
[TextEntry] : Patient here for follow-up status post revision right total knee replacement on 6/17/2024.  Is not taking anything for pain.  Ambulating with a cane.  States he is going to [place] in a week for every weekend this summer.  Has been putting weight on the knee and he has not had any pain.  Has been using the brace.  Left knee Incision healing well without signs of infection ROM 0-105 Stable to varus/valgus stress Minimal AP laxity in flexion Neurovascularly intact  Left knee 3 views-hardware in good alignment  Assessment/plan Patient doing well.  Continue physical therapy/encourage ambulation, prescription for outpatient physical therapy given.  Patient may be protected weightbearing with a cane and [place] unlocked.  May ambulate without the brace while at home for short distances.  Active and passive range of motion okay with physical therapy with brace unlocked.   Okay to begin light strength training with physical therapy.  Discussed he should be careful if he starts having pain with weightbearing he should back off.  Pain control as needed.  Patient to follow-up in 2 weeks or sooner if any concerns.

## 2024-08-13 ENCOUNTER — APPOINTMENT (OUTPATIENT)
Dept: ORTHOPEDIC SURGERY | Facility: CLINIC | Age: 74
End: 2024-08-13
Payer: COMMERCIAL

## 2024-08-13 VITALS
WEIGHT: 220 LBS | HEIGHT: 69 IN | BODY MASS INDEX: 32.58 KG/M2 | HEART RATE: 65 BPM | SYSTOLIC BLOOD PRESSURE: 109 MMHG | DIASTOLIC BLOOD PRESSURE: 64 MMHG

## 2024-08-13 DIAGNOSIS — Z96.651 PRESENCE OF RIGHT ARTIFICIAL KNEE JOINT: ICD-10-CM

## 2024-08-13 PROCEDURE — 73562 X-RAY EXAM OF KNEE 3: CPT | Mod: RT

## 2024-08-13 PROCEDURE — 99024 POSTOP FOLLOW-UP VISIT: CPT

## 2024-08-13 RX ORDER — CELECOXIB 200 MG/1
200 CAPSULE ORAL TWICE DAILY
Qty: 60 | Refills: 1 | Status: ACTIVE | COMMUNITY
Start: 2024-08-13 | End: 1900-01-01

## 2024-08-13 NOTE — HISTORY OF PRESENT ILLNESS
[TextEntry] : Patient here for follow-up status post revision right total knee replacement on 6/17/2024.  Is not taking anything for pain.  Ambulating with a cane.  Has been putting weight on the knee and he has not had any pain.  Has been using the brace and going to physical therapy.  Does complain of some pain mid lower extremity, describes as burning sensation with pins-and-needles.  He is taking gabapentin for his back although does not feel this is doing anything.  Stairs are still difficult.  Left knee Incision well-healed without signs of infection ROM 0-110 Stable to varus/valgus stress Minimal AP laxity in flexion Neurovascularly intact  Left knee 3 views-hardware in good alignment  Assessment/plan Patient doing well.  Continue physical therapy/encourage ambulation, prescription for outpatient physical therapy given.  Patient may be weightbearing as tolerated.  Cane as needed.  May ambulate without the brace unless doing any strenuous activities.  Active and passive range of motion okay with physical therapy with brace unlocked.  Pain control as needed.  A prescription for celecoxib with the appropriate warnings for cardiac, and renal side effects was given to the patient.  Patient was instructed not to use any other NSAIDs with this medication.  Patient to follow-up in 6 weeks or sooner if any concerns.

## 2024-08-14 ENCOUNTER — APPOINTMENT (OUTPATIENT)
Dept: CT IMAGING | Facility: CLINIC | Age: 74
End: 2024-08-14

## 2024-08-30 NOTE — CONSULT LETTER
[Dear  ___] : Dear  [unfilled], [Consult Letter:] : I had the pleasure of evaluating your patient, [unfilled]. [( Thank you for referring [unfilled] for consultation for _____ )] : Thank you for referring [unfilled] for consultation for [unfilled] [Please see my note below.] : Please see my note below. [Consult Closing:] : Thank you very much for allowing me to participate in the care of this patient.  If you have any questions, please do not hesitate to contact me. [Sincerely,] : Sincerely, [FreeTextEntry2] : Dr. Dre Buenrostro (pulm/ref) [FreeTextEntry3] : Jaime Francis MD\par  Attending Surgeon\par  Division of Thoracic Surgery\par  , Cardiovascular and Thoracic Surgery\par  [facility] [employer] [facility]

## 2024-08-30 NOTE — CONSULT LETTER
[Dear  ___] : Dear  [unfilled], [Consult Letter:] : I had the pleasure of evaluating your patient, [unfilled]. [( Thank you for referring [unfilled] for consultation for _____ )] : Thank you for referring [unfilled] for consultation for [unfilled] [Please see my note below.] : Please see my note below. [Consult Closing:] : Thank you very much for allowing me to participate in the care of this patient.  If you have any questions, please do not hesitate to contact me. [Sincerely,] : Sincerely, [FreeTextEntry2] : Dr. Dre Buenrostro (pulm/ref) [FreeTextEntry3] : Jaime Francis MD\par  Attending Surgeon\par  Division of Thoracic Surgery\par  , Cardiovascular and Thoracic Surgery\par  [facility]

## 2024-08-30 NOTE — HISTORY OF PRESENT ILLNESS
[FreeTextEntry1] : Mr. SHWETA VELAZCO, 73 year old male, never smoker, w/ hx of arthritis, kidney stones, renal cyst, HLD, seasonal allergies - on allergy shots in past, CHEL on PAP therapy, Abnormal Chest CT w/ nodule (known for about 16 years) & Covid-19 infection 2020, referred by Dr. Dre Buenrostro (pulm) for enlarged LLL pulmonary nodule. Patient was initially consulted on 12/07/2022.   CT Abd/Pelvis on 11/03/2022: - 1.1 cm left lower lobe pulmonary nodule is slightly increased in size compared with 2019 (9 mm) and with prior examinations dating back to 2009 (4 mm).  CT chest on 01/03/2022:  - 1 cm solid nodule in the left lower lobe has increased in size when compared to study of 7/22/2014. Exact etiology is unclear.   PET/CT on 01/23/2023:  - Known left lower lobe lung nodule is similar in size to most recent chest CT and exhibits no abnormal FDG uptake. - FDG avid right high inguinal lymph node is noted. Further correlation as clinically indicated. Differential includes benign inflammatory and malignant etiologies. - Prostate uptake is noted. Further correlation with dedicated pelvic imaging may be of additional value as clinically indicated.  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 Nodule measured 1.1 x 0.9 cm on the diagnostic CT with differences in size likely related to technical factors. - Prostate gland measures 4.3 cm in transverse dimension with heterogeneous activity showing SUV 8.4 in the right lobe and 5.3 in the left lobe. Findings are nonspecific but should be correlated with PSA.  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 I have explained this to the patient and have recommended resection. The patient is very anxious and is reluctant to proceed. He is going to follow up with his pulmonologist to discuss it further and obtain PFTs. I have asked the patient to return to the office after he meets with his pulmonologist. Patient did not f/u with me.   PFTs on 10/11/2023: FEV1 2.08 (74%), DLCO 115%.   CT chest on 10/31/2023: ([facility]) - Left lower lobe solid nodule: Measures 1.3 cm x 1.0 cm (series 10, image 477), previously this measured 1.2 cm x 1.1 cm (series 8, image 100). On CT abdomen pelvis 10/9/2020 this measured 1.1 cm x 1.0 cm (series 3, image 7). - Severe coronary artery calcifications. - Possible mild reflux esophagitis. Correlation with symptomatology is recommended. If clinically warranted, endoscopy could be performed to further evaluate.  On 11/13/2023, CT chest reviewed and explained to patient, stable LLL nodule, likely a carcinoid tumor, recommended surgical resection, will schedule Left VATS, LLLobectomy on 01/25/2024.   On 01/18/2024, CT reviewed and again discussed possible likely a carcinoid tumor, I recommended surgical resection. Discussed with pt risks of delay in treatment and possible spreading of the disease. Pt is still hesitant about surgery and would continue with surveillance scan. RTC in March 2024 with repeat CT Chest   CT chest on 03/01/2024: -  Mild interval enlargement of the solid 1.2 x 1.0 cm anterior LLL nodule (series 2, image 71), previously measuring 1.0 x 0.9 cm in January 2023 and 6 mm in 2013 - Coronary artery calcification. - Mildly increased pneumobilia since February 2023 PET/CT; Cholecystectomy.  CT chest on 08/14/2024:  - Unchanged left lower lobe 1.2 cm circumscribed solid nodule (and slowly growing compared to more remote studies, for example, 0.7 cm on 7/22/2014). Unchanged (2014) punctate subpleural nodule in the left upper lobe (2-43).    Depression screening completed on   Patient is here today for a follow up.

## 2024-08-30 NOTE — ASSESSMENT
[FreeTextEntry1] : Mr. SHWETA VELAZCO, 73 year old male, never smoker, w/ hx of arthritis, kidney stones, renal cyst, HLD, seasonal allergies - on allergy shots in past, CHEL on PAP therapy, Abnormal Chest CT w/ nodule (known for about 16 years) & Covid-19 infection 2020, referred by Dr. Dre Buenrostro (pulm) for enlarged LLL pulmonary nodule. Patient was initially consulted on 12/07/2022.   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 I have explained this to the patient and have recommended resection. The patient is very anxious and is reluctant to proceed. He is going to follow up with his pulmonologist to discuss it further and obtain PFTs. I have asked the patient to return to the office after he meets with his pulmonologist. Patient did not f/u with me.   On 11/13/2023, CT chest reviewed and explained to patient, stable LLL nodule, likely a carcinoid tumor, recommended surgical resection, will schedule Left VATS, LLL Lobectomy on 01/25/2024.   On 01/18/2024, CT reviewed and again discussed possible likely a carcinoid tumor, I recommended surgical resection. Discussed with pt risks of delay in treatment and possible spreading of the disease. Pt is still hesitant about surgery and would continue with surveillance scan.

## 2024-08-30 NOTE — HISTORY OF PRESENT ILLNESS
[FreeTextEntry1] : Mr. SHWETA VELAZCO, 73 year old male, never smoker, w/ hx of arthritis, kidney stones, renal cyst, HLD, seasonal allergies - on allergy shots in past, CHEL on PAP therapy, Abnormal Chest CT w/ nodule (known for about 16 years) & Covid-19 infection 2020, referred by Dr. Dre Buenrostro (pulm) for enlarged LLL pulmonary nodule. Patient was initially consulted on 12/07/2022.   CT Abd/Pelvis on 11/03/2022: - 1.1 cm left lower lobe pulmonary nodule is slightly increased in size compared with 2019 (9 mm) and with prior examinations dating back to 2009 (4 mm).  CT chest on 01/03/2022:  - 1 cm solid nodule in the left lower lobe has increased in size when compared to study of 7/22/2014. Exact etiology is unclear.   PET/CT on 01/23/2023:  - Known left lower lobe lung nodule is similar in size to most recent chest CT and exhibits no abnormal FDG uptake. - FDG avid right high inguinal lymph node is noted. Further correlation as clinically indicated. Differential includes benign inflammatory and malignant etiologies. - Prostate uptake is noted. Further correlation with dedicated pelvic imaging may be of additional value as clinically indicated.  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 Nodule measured 1.1 x 0.9 cm on the diagnostic CT with differences in size likely related to technical factors. - Prostate gland measures 4.3 cm in transverse dimension with heterogeneous activity showing SUV 8.4 in the right lobe and 5.3 in the left lobe. Findings are nonspecific but should be correlated with PSA.  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 I have explained this to the patient and have recommended resection. The patient is very anxious and is reluctant to proceed. He is going to follow up with his pulmonologist to discuss it further and obtain PFTs. I have asked the patient to return to the office after he meets with his pulmonologist. Patient did not f/u with me.   PFTs on 10/11/2023: FEV1 2.08 (74%), DLCO 115%.   CT chest on 10/31/2023: ([facility]) - Left lower lobe solid nodule: Measures 1.3 cm x 1.0 cm (series 10, image 477), previously this measured 1.2 cm x 1.1 cm (series 8, image 100). On CT abdomen pelvis 10/9/2020 this measured 1.1 cm x 1.0 cm (series 3, image 7). - Severe coronary artery calcifications. - Possible mild reflux esophagitis. Correlation with symptomatology is recommended. If clinically warranted, endoscopy could be performed to further evaluate.  On 11/13/2023, CT chest reviewed and explained to patient, stable LLL nodule, likely a carcinoid tumor, recommended surgical resection, will schedule Left VATS, LLLobectomy on 01/25/2024.   On 01/18/2024, CT reviewed and again discussed possible likely a carcinoid tumor, I recommended surgical resection. Discussed with pt risks of delay in treatment and possible spreading of the disease. Pt is still hesitant about surgery and would continue with surveillance scan. RTC in March 2024 with repeat CT Chest   CT chest on 03/01/2024: -  Mild interval enlargement of the solid 1.2 x 1.0 cm anterior LLL nodule (series 2, image 71), previously measuring 1.0 x 0.9 cm in January 2023 and 6 mm in 2013 - Coronary artery calcification. - Mildly increased pneumobilia since February 2023 PET/CT; Cholecystectomy.  CT chest on 08/14/2024:  - Unchanged left lower lobe 1.2 cm circumscribed solid nodule (and slowly growing compared to more remote studies, for example, 0.7 cm on 7/22/2014). Unchanged (2014) punctate subpleural nodule in the left upper lobe (2-43).    Depression screening completed on   Patient is here today for a follow up.

## 2024-09-04 ENCOUNTER — APPOINTMENT (OUTPATIENT)
Dept: THORACIC SURGERY | Facility: CLINIC | Age: 74
End: 2024-09-04

## 2024-09-04 DIAGNOSIS — R91.8 OTHER NONSPECIFIC ABNORMAL FINDING OF LUNG FIELD: ICD-10-CM

## 2024-09-18 ENCOUNTER — APPOINTMENT (OUTPATIENT)
Dept: UROLOGY | Facility: CLINIC | Age: 74
End: 2024-09-18
Payer: COMMERCIAL

## 2024-09-18 VITALS
RESPIRATION RATE: 16 BRPM | BODY MASS INDEX: 32.58 KG/M2 | HEART RATE: 80 BPM | WEIGHT: 220 LBS | HEIGHT: 69 IN | OXYGEN SATURATION: 95 % | DIASTOLIC BLOOD PRESSURE: 69 MMHG | SYSTOLIC BLOOD PRESSURE: 108 MMHG

## 2024-09-18 DIAGNOSIS — N40.0 BENIGN PROSTATIC HYPERPLASIA WITHOUT LOWER URINARY TRACT SYMPMS: ICD-10-CM

## 2024-09-18 PROCEDURE — 99212 OFFICE O/P EST SF 10 MIN: CPT

## 2024-09-18 NOTE — END OF VISIT
[FreeTextEntry3] : Recommendation: Labs are sent and a renal sonogram was recommended, but declined by the patient. He will follow up in one year or as needed. All medical record entries made by Rosmery Gonzalez (On license of [facility]) were at my, Dr. Eason, direction and personally dictated by me on 09/18/2024. I have reviewed the chart and agree that the record accurately reflects my personal performance of the history, physical exam, assessment, and plan. I have also personally directed, reviewed, and agreed with the chart.

## 2024-09-18 NOTE — HISTORY OF PRESENT ILLNESS
[FreeTextEntry1] :  SHWETA VELAZCO is a 73 year male with CC of checkup. Patient has history of kidney stones in the past, along with prostatism. He states that he has been urinating well with no complaints.

## 2024-09-19 LAB
APPEARANCE: CLEAR
BACTERIA: NEGATIVE /HPF
BILIRUBIN URINE: NEGATIVE
BLOOD URINE: NEGATIVE
CAST: 0 /LPF
COLOR: YELLOW
EPITHELIAL CELLS: 1 /HPF
GLUCOSE QUALITATIVE U: NEGATIVE MG/DL
KETONES URINE: NEGATIVE MG/DL
LEUKOCYTE ESTERASE URINE: ABNORMAL
MICROSCOPIC-UA: NORMAL
NITRITE URINE: NEGATIVE
PH URINE: 7
PROTEIN URINE: NEGATIVE MG/DL
PSA SERPL-MCNC: 0.31 NG/ML
RED BLOOD CELLS URINE: 3 /HPF
SPECIFIC GRAVITY URINE: 1.02
UROBILINOGEN URINE: 1 MG/DL
WHITE BLOOD CELLS URINE: 1 /HPF

## 2024-09-20 ENCOUNTER — NON-APPOINTMENT (OUTPATIENT)
Age: 74
End: 2024-09-20

## 2024-09-23 NOTE — DISCUSSION/SUMMARY
[de-identified] : Patient doing well. Continue physical therapy/encourage ambulation, prescription for outpatient physical therapy given. Patient may be weightbearing as tolerated. Cane as needed. May ambulate without the brace unless doing any strenuous activities. Active and passive range of motion okay with physical therapy with brace unlocked. Pain control as needed. A prescription for celecoxib with the appropriate warnings for cardiac, and renal side effects was given to the patient. Patient was instructed not to use any other NSAIDs with this medication. Patient to follow-up in 6 weeks or sooner if any concerns.

## 2024-09-23 NOTE — HISTORY OF PRESENT ILLNESS
[de-identified] : 9/24/24:  Patient here for follow-up status post revision right total knee replacement on 6/17/2024. Is not taking anything for pain. Ambulating with a cane. Has been putting weight on the knee and he has not had any pain. Has been using the brace and going to physical therapy. Does complain of some pain mid lower extremity, describes as burning sensation with pins-and-needles. He is taking gabapentin for his back although does not feel this is doing anything. Stairs are still difficult.

## 2024-09-23 NOTE — PHYSICAL EXAM
[de-identified] : Left knee Incision well-healed without signs of infection ROM 0-110 Stable to varus/valgus stress Minimal AP laxity in flexion Neurovascularly intact [de-identified] : Left knee 3 views-hardware in good alignment

## 2024-09-24 ENCOUNTER — APPOINTMENT (OUTPATIENT)
Dept: ORTHOPEDIC SURGERY | Facility: CLINIC | Age: 74
End: 2024-09-24

## 2024-09-25 ENCOUNTER — APPOINTMENT (OUTPATIENT)
Dept: THORACIC SURGERY | Facility: CLINIC | Age: 74
End: 2024-09-25
Payer: COMMERCIAL

## 2024-09-25 VITALS
WEIGHT: 220 LBS | SYSTOLIC BLOOD PRESSURE: 123 MMHG | DIASTOLIC BLOOD PRESSURE: 77 MMHG | BODY MASS INDEX: 32.58 KG/M2 | HEART RATE: 72 BPM | OXYGEN SATURATION: 95 % | HEIGHT: 69 IN

## 2024-09-25 DIAGNOSIS — R91.8 OTHER NONSPECIFIC ABNORMAL FINDING OF LUNG FIELD: ICD-10-CM

## 2024-09-25 PROCEDURE — G2211 COMPLEX E/M VISIT ADD ON: CPT | Mod: NC

## 2024-09-25 PROCEDURE — 99213 OFFICE O/P EST LOW 20 MIN: CPT

## 2024-09-26 NOTE — CONSULT LETTER
[FreeTextEntry2] : Dr. Dre Buenrostro (pulm/ref) [FreeTextEntry3] : Jaime Francis MD\par  Attending Surgeon\par  Division of Thoracic Surgery\par  , Cardiovascular and Thoracic Surgery\par  [facility] [employer] [facility]

## 2024-09-26 NOTE — ASSESSMENT
[FreeTextEntry1] : Mr. SHWETA VELAZCO, 73 year old male, never smoker, w/ hx of arthritis, kidney stones, renal cyst, HLD, seasonal allergies - on allergy shots in past, CHEL on PAP therapy, Abnormal Chest CT w/ nodule (known for about 16 years) & Covid-19 infection 2020, referred by Dr. Dre Buenrostro (pulm) for enlarged LLL pulmonary nodule. Patient was initially consulted on 12/07/2022.   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 I have explained this to the patient and have recommended resection. The patient is very anxious and is reluctant to proceed. He is going to follow up with his pulmonologist to discuss it further and obtain PFTs. I have asked the patient to return to the office after he meets with his pulmonologist. Patient did not f/u with me.   On 11/13/2023, CT chest reviewed and explained to patient, stable LLL nodule, likely a carcinoid tumor, recommended surgical resection, will schedule Left VATS, LLL Lobectomy on 01/25/2024.   On 01/18/2024, CT reviewed and again discussed possible likely a carcinoid tumor, I recommended surgical resection. Discussed with pt risks of delay in treatment and possible spreading of the disease. Pt is still hesitant about surgery and would continue with surveillance scan.   On 3/06/2024: Most recent CT of the chest shows a stable nodule from prior scan but larger from more remote scans. Pt wishes to continue surveillance. Once again, I explained that this is likely a carcinoid and does have a small chance of metastasis in the future. He understands and wishes surveillance. I have asked the patient to obtain a repeat CT of the chest in 6 months and to follow up with me in my office at that time. I have answered all of his questions, and he understands the importance of follow up.  I have reviewed the patient's medical records and diagnostic images at time of this office consultation and have made the following recommendation: 1. CT chest shows stable lung nodule but has gradually increased since 2014. Discussed this is most likely a carcinoid and has a risk for metastasis, recommended surgical resection of the nodule. Patient opted for conservative management. Recommended to repeat CT chest without contrast in 6 months to re-evaluate. Answered all of his questions, and he understands the importance of follow up.  I, BUDDY Alvarado, personally performed the evaluation and management (E/M) services for this established patient who presents today with (a) new problem(s)/exacerbation of (an) existing condition(s). That E/M includes conducting the examination, assessing all new/exacerbated conditions, and establishing a new plan of care. Today, my ACP, SENAIT Gordillo, was here to observe my evaluation and management services for this new problem/exacerbated condition to be followed going forward.

## 2024-09-26 NOTE — CONSULT LETTER
[FreeTextEntry2] : Dr. Dre Buenrostro (pulm/ref) [FreeTextEntry3] : Jaime Francis MD\par  Attending Surgeon\par  Division of Thoracic Surgery\par  , Cardiovascular and Thoracic Surgery\par  [facility] [employer] [facility]

## 2024-09-26 NOTE — HISTORY OF PRESENT ILLNESS
[FreeTextEntry1] : Mr. SHWETA VELAZCO, 73 year old male, never smoker, w/ hx of arthritis, kidney stones, renal cyst, HLD, seasonal allergies - on allergy shots in past, CHEL on PAP therapy, Abnormal Chest CT w/ nodule (known for about 16 years) & Covid-19 infection 2020, referred by Dr. Dre Buenrostro (pulm) for enlarged LLL pulmonary nodule. Patient was initially consulted on 12/07/2022.   CT Abd/Pelvis on 11/03/2022: - 1.1 cm left lower lobe pulmonary nodule is slightly increased in size compared with 2019 (9 mm) and with prior examinations dating back to 2009 (4 mm).  CT chest on 01/03/2022:  - 1 cm solid nodule in the left lower lobe has increased in size when compared to study of 7/22/2014. Exact etiology is unclear.   PET/CT on 01/23/2023:  - Known left lower lobe lung nodule is similar in size to most recent chest CT and exhibits no abnormal FDG uptake. - FDG avid right high inguinal lymph node is noted. Further correlation as clinically indicated. Differential includes benign inflammatory and malignant etiologies. - Prostate uptake is noted. Further correlation with dedicated pelvic imaging may be of additional value as clinically indicated.  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 Nodule measured 1.1 x 0.9 cm on the diagnostic CT with differences in size likely related to technical factors. - Prostate gland measures 4.3 cm in transverse dimension with heterogeneous activity showing SUV 8.4 in the right lobe and 5.3 in the left lobe. Findings are nonspecific but should be correlated with PSA.  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 I have explained this to the patient and have recommended resection. The patient is very anxious and is reluctant to proceed. He is going to follow up with his pulmonologist to discuss it further and obtain PFTs. I have asked the patient to return to the office after he meets with his pulmonologist. Patient did not f/u with me.   PFTs on 10/11/2023: FEV1 2.08 (74%), DLCO 115%.   CT chest on 10/31/2023: ([facility]) - Left lower lobe solid nodule: Measures 1.3 cm x 1.0 cm (series 10, image 477), previously this measured 1.2 cm x 1.1 cm (series 8, image 100). On CT abdomen pelvis 10/9/2020 this measured 1.1 cm x 1.0 cm (series 3, image 7). - Severe coronary artery calcifications. - Possible mild reflux esophagitis. Correlation with symptomatology is recommended. If clinically warranted, endoscopy could be performed to further evaluate.  On 11/13/2023, CT chest reviewed and explained to patient, stable LLL nodule, likely a carcinoid tumor, recommended surgical resection, will schedule Left VATS, LLLobectomy on 01/25/2024.   On 01/18/2024, CT reviewed and again discussed possible likely a carcinoid tumor, I recommended surgical resection. Discussed with pt risks of delay in treatment and possible spreading of the disease. Pt is still hesitant about surgery and would continue with surveillance scan. RTC in March 2024 with repeat CT Chest   CT chest on 03/01/2024: -  Mild interval enlargement of the solid 1.2 x 1.0 cm anterior LLL nodule (series 2, image 71), previously measuring 1.0 x 0.9 cm in January 2023 and 6 mm in 2013 - Coronary artery calcification. - Mildly increased pneumobilia since February 2023 PET/CT; Cholecystectomy.  CT chest on 08/14/2024:  - Unchanged left lower lobe 1.2 cm circumscribed solid nodule (and slowly growing compared to more remote studies, for example, 0.7 cm on 7/22/2014). Unchanged (2014) punctate subpleural nodule in the left upper lobe (2-43).    Depression screening completed on 9/25/2024  Patient is here today for a follow up. He reports feeling well, denies any chest pain, SOB, cough, fever or chills.

## 2024-09-26 NOTE — HISTORY OF PRESENT ILLNESS
[FreeTextEntry1] : Mr. SHWETA VELAZCO, 73 year old male, never smoker, w/ hx of arthritis, kidney stones, renal cyst, HLD, seasonal allergies - on allergy shots in past, CHEL on PAP therapy, Abnormal Chest CT w/ nodule (known for about 16 years) & Covid-19 infection 2020, referred by Dr. Dre Buenrostro (pulm) for enlarged LLL pulmonary nodule. Patient was initially consulted on 12/07/2022.   CT Abd/Pelvis on 11/03/2022: - 1.1 cm left lower lobe pulmonary nodule is slightly increased in size compared with 2019 (9 mm) and with prior examinations dating back to 2009 (4 mm).  CT chest on 01/03/2022:  - 1 cm solid nodule in the left lower lobe has increased in size when compared to study of 7/22/2014. Exact etiology is unclear.   PET/CT on 01/23/2023:  - Known left lower lobe lung nodule is similar in size to most recent chest CT and exhibits no abnormal FDG uptake. - FDG avid right high inguinal lymph node is noted. Further correlation as clinically indicated. Differential includes benign inflammatory and malignant etiologies. - Prostate uptake is noted. Further correlation with dedicated pelvic imaging may be of additional value as clinically indicated.  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 Nodule measured 1.1 x 0.9 cm on the diagnostic CT with differences in size likely related to technical factors. - Prostate gland measures 4.3 cm in transverse dimension with heterogeneous activity showing SUV 8.4 in the right lobe and 5.3 in the left lobe. Findings are nonspecific but should be correlated with PSA.  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 I have explained this to the patient and have recommended resection. The patient is very anxious and is reluctant to proceed. He is going to follow up with his pulmonologist to discuss it further and obtain PFTs. I have asked the patient to return to the office after he meets with his pulmonologist. Patient did not f/u with me.   PFTs on 10/11/2023: FEV1 2.08 (74%), DLCO 115%.   CT chest on 10/31/2023: ([facility]) - Left lower lobe solid nodule: Measures 1.3 cm x 1.0 cm (series 10, image 477), previously this measured 1.2 cm x 1.1 cm (series 8, image 100). On CT abdomen pelvis 10/9/2020 this measured 1.1 cm x 1.0 cm (series 3, image 7). - Severe coronary artery calcifications. - Possible mild reflux esophagitis. Correlation with symptomatology is recommended. If clinically warranted, endoscopy could be performed to further evaluate.  On 11/13/2023, CT chest reviewed and explained to patient, stable LLL nodule, likely a carcinoid tumor, recommended surgical resection, will schedule Left VATS, LLLobectomy on 01/25/2024.   On 01/18/2024, CT reviewed and again discussed possible likely a carcinoid tumor, I recommended surgical resection. Discussed with pt risks of delay in treatment and possible spreading of the disease. Pt is still hesitant about surgery and would continue with surveillance scan. RTC in March 2024 with repeat CT Chest   CT chest on 03/01/2024: -  Mild interval enlargement of the solid 1.2 x 1.0 cm anterior LLL nodule (series 2, image 71), previously measuring 1.0 x 0.9 cm in January 2023 and 6 mm in 2013 - Coronary artery calcification. - Mildly increased pneumobilia since February 2023 PET/CT; Cholecystectomy.  CT chest on 08/14/2024:  - Unchanged left lower lobe 1.2 cm circumscribed solid nodule (and slowly growing compared to more remote studies, for example, 0.7 cm on 7/22/2014). Unchanged (2014) punctate subpleural nodule in the left upper lobe (2-43).    Depression screening completed on 9/25/2024  Patient is here today for a follow up. He reports feeling well, denies any chest pain, SOB, cough, fever or chills.

## 2024-09-26 NOTE — CONSULT LETTER
[FreeTextEntry2] : Dr. Dre Buenrostro (pulm/ref) [FreeTextEntry3] : Jaime Francis MD\par  Attending Surgeon\par  Division of Thoracic Surgery\par  , Cardiovascular and Thoracic Surgery\par  [facility] [employer] [facility] at [facility]

## 2024-09-26 NOTE — HISTORY OF PRESENT ILLNESS
[FreeTextEntry1] : Mr. SHWETA VELAZCO, 73 year old male, never smoker, w/ hx of arthritis, kidney stones, renal cyst, HLD, seasonal allergies - on allergy shots in past, CHEL on PAP therapy, Abnormal Chest CT w/ nodule (known for about 16 years) & Covid-19 infection 2020, referred by Dr. Dre Buenrostro (pulm) for enlarged LLL pulmonary nodule. Patient was initially consulted on 12/07/2022.   CT Abd/Pelvis on 11/03/2022: - 1.1 cm left lower lobe pulmonary nodule is slightly increased in size compared with 2019 (9 mm) and with prior examinations dating back to 2009 (4 mm).  CT chest on 01/03/2022:  - 1 cm solid nodule in the left lower lobe has increased in size when compared to study of 7/22/2014. Exact etiology is unclear.   PET/CT on 01/23/2023:  - Known left lower lobe lung nodule is similar in size to most recent chest CT and exhibits no abnormal FDG uptake. - FDG avid right high inguinal lymph node is noted. Further correlation as clinically indicated. Differential includes benign inflammatory and malignant etiologies. - Prostate uptake is noted. Further correlation with dedicated pelvic imaging may be of additional value as clinically indicated.  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 Nodule measured 1.1 x 0.9 cm on the diagnostic CT with differences in size likely related to technical factors. - Prostate gland measures 4.3 cm in transverse dimension with heterogeneous activity showing SUV 8.4 in the right lobe and 5.3 in the left lobe. Findings are nonspecific but should be correlated with PSA.  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 I have explained this to the patient and have recommended resection. The patient is very anxious and is reluctant to proceed. He is going to follow up with his pulmonologist to discuss it further and obtain PFTs. I have asked the patient to return to the office after he meets with his pulmonologist. Patient did not f/u with me.   PFTs on 10/11/2023: FEV1 2.08 (74%), DLCO 115%.   CT chest on 10/31/2023: ([facility]) - Left lower lobe solid nodule: Measures 1.3 cm x 1.0 cm (series 10, image 477), previously this measured 1.2 cm x 1.1 cm (series 8, image 100). On CT abdomen pelvis 10/9/2020 this measured 1.1 cm x 1.0 cm (series 3, image 7). - Severe coronary artery calcifications. - Possible mild reflux esophagitis. Correlation with symptomatology is recommended. If clinically warranted, endoscopy could be performed to further evaluate.  On 11/13/2023, CT chest reviewed and explained to patient, stable LLL nodule, likely a carcinoid tumor, recommended surgical resection, will schedule Left VATS, LLLobectomy on 01/25/2024.   On 01/18/2024, CT reviewed and again discussed possible likely a carcinoid tumor, I recommended surgical resection. Discussed with pt risks of delay in treatment and possible spreading of the disease. Pt is still hesitant about surgery and would continue with surveillance scan. RTC in March 2024 with repeat CT Chest   CT chest on 03/01/2024: -  Mild interval enlargement of the solid 1.2 x 1.0 cm anterior LLL nodule (series 2, image 71), previously measuring 1.0 x 0.9 cm in January 2023 and 6 mm in 2013 - Coronary artery calcification. - Mildly increased pneumobilia since February 2023 PET/CT; Cholecystectomy.  CT chest on 08/14/2024:  - Unchanged left lower lobe 1.2 cm circumscribed solid nodule (and slowly growing compared to more remote studies, for example, 0.7 cm on 7/22/2014). Unchanged (2014) punctate subpleural nodule in the left upper lobe (2-43).    Depression screening completed on 9/25/2024  Patient is here today for a follow up. He reports feeling well, denies any chest pain, SOB, cough, fever or chills.

## 2024-09-27 ENCOUNTER — APPOINTMENT (OUTPATIENT)
Dept: ORTHOPEDIC SURGERY | Facility: CLINIC | Age: 74
End: 2024-09-27
Payer: OTHER MISCELLANEOUS

## 2024-09-27 VITALS
SYSTOLIC BLOOD PRESSURE: 126 MMHG | DIASTOLIC BLOOD PRESSURE: 80 MMHG | WEIGHT: 220 LBS | BODY MASS INDEX: 32.58 KG/M2 | HEIGHT: 69 IN | HEART RATE: 66 BPM

## 2024-09-27 PROCEDURE — 73562 X-RAY EXAM OF KNEE 3: CPT | Mod: RT

## 2024-09-27 PROCEDURE — 99214 OFFICE O/P EST MOD 30 MIN: CPT

## 2024-09-27 NOTE — HISTORY OF PRESENT ILLNESS
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 Is taking Celebrex for pain although is unsure if it helps. Ambulating without assistance as he lost his cane at [employer]. Does complain of some pain mid lower extremity, describes as burning sensation with pins-and-needles.  Also some pain on the medial aspect of the knee.  Is feeling better overall however.  Has been traveling to [place] as his sister recently suffered a cardiac event and has significant brain damage. [FreeTextEntry1] : 9/27/24: Patient here for follow-up status post revision right total knee replacement on 6/17/2024. Is taking Celebrex for pain although is unsure if it helps. Ambulating without assistance as he lost his cane at [employer]. Does complain of some pain mid lower extremity, describes as burning sensation with pins-and-needles. Also some pain on the medial aspect of the knee. Is feeling better overall however. Has been traveling to [place] as his sister recently suffered a cardiac event and has significant brain damage.

## 2024-09-27 NOTE — HISTORY OF PRESENT ILLNESS
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 Is taking Celebrex for pain although is unsure if it helps. Ambulating without assistance as he lost his cane at [employer]. Does complain of some pain mid lower extremity, describes as burning sensation with pins-and-needles.  Also some pain on the medial aspect of the knee.  Is feeling better overall however.  Has been traveling to [place] as his sister recently suffered a cardiac event and has significant brain damage. [FreeTextEntry1] : 9/27/24: Patient here for follow-up status post revision right total knee replacement on 6/17/2024. Is taking Celebrex for pain although is unsure if it helps. Ambulating without assistance as he lost his cane at [employer]. Does complain of some pain mid lower extremity, describes as burning sensation with pins-and-needles. Also some pain on the medial aspect of the knee. Is feeling better overall however. Has been traveling to [place] as his sister recently suffered a cardiac event and has significant brain damage.

## 2024-09-27 NOTE — DISCUSSION/SUMMARY
[de-identified] : Patient doing well. Continue physical therapy/encourage ambulation. Pain control as needed. Continue Celebrex. Patient to follow-up in 3 months  My cumulative time spent on this patient's visit included: Preparation for the visit, review of the medical records, review of pertinent diagnostic studies, examination and counseling of the patient on the above diagnosis, treatment plan and prognosis, orders of diagnostic tests, medications and/or appropriate procedures and documentation in the medical records of today's visit. Time spent on separately reportable procedures is excluded.

## 2024-09-27 NOTE — PHYSICAL EXAM
[de-identified] : Left knee ROM 0-120 Stable to varus/valgus stress Minimal AP laxity in flexion Neurovascularly intact   [de-identified] : Left knee 3 views-hardware in good alignment.

## 2024-09-27 NOTE — PHYSICAL EXAM
[de-identified] : Left knee ROM 0-120 Stable to varus/valgus stress Minimal AP laxity in flexion Neurovascularly intact   [de-identified] : Left knee 3 views-hardware in good alignment.

## 2024-09-27 NOTE — HISTORY OF PRESENT ILLNESS
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 Is taking Celebrex for pain although is unsure if it helps. Ambulating without assistance as he lost his cane at [employer]. Does complain of some pain mid lower extremity, describes as burning sensation with pins-and-needles.  Also some pain on the medial aspect of the knee.  Is feeling better overall however.  Has been traveling to [place] as his sister recently suffered a cardiac event and has significant brain damage. [FreeTextEntry1] : 9/27/24: Patient here for follow-up status post revision right total knee replacement on 6/17/2024. Is taking Celebrex for pain although is unsure if it helps. Ambulating without assistance as he lost his cane at [employer]. Does complain of some pain mid lower extremity, describes as burning sensation with pins-and-needles. Also some pain on the medial aspect of the knee. Is feeling better overall however. Has been traveling to [place] as his sister recently suffered a cardiac event and has significant brain damage.

## 2024-09-27 NOTE — DISCUSSION/SUMMARY
[de-identified] : Patient doing well. Continue physical therapy/encourage ambulation. Pain control as needed. Continue Celebrex. Patient to follow-up in 3 months  My cumulative time spent on this patient's visit included: Preparation for the visit, review of the medical records, review of pertinent diagnostic studies, examination and counseling of the patient on the above diagnosis, treatment plan and prognosis, orders of diagnostic tests, medications and/or appropriate procedures and documentation in the medical records of today's visit. Time spent on separately reportable procedures is excluded.

## 2024-09-27 NOTE — PHYSICAL EXAM
[de-identified] : Left knee ROM 0-120 Stable to varus/valgus stress Minimal AP laxity in flexion Neurovascularly intact   [de-identified] : Left knee 3 views-hardware in good alignment.

## 2024-09-27 NOTE — DISCUSSION/SUMMARY
[de-identified] : Patient doing well. Continue physical therapy/encourage ambulation. Pain control as needed. Continue Celebrex. Patient to follow-up in 3 months  My cumulative time spent on this patient's visit included: Preparation for the visit, review of the medical records, review of pertinent diagnostic studies, examination and counseling of the patient on the above diagnosis, treatment plan and prognosis, orders of diagnostic tests, medications and/or appropriate procedures and documentation in the medical records of today's visit. Time spent on separately reportable procedures is excluded.

## 2024-10-03 DIAGNOSIS — Z96.651 PRESENCE OF RIGHT ARTIFICIAL KNEE JOINT: ICD-10-CM

## 2024-11-12 DIAGNOSIS — Z96.651 PRESENCE OF RIGHT ARTIFICIAL KNEE JOINT: ICD-10-CM

## 2024-11-22 ENCOUNTER — NON-APPOINTMENT (OUTPATIENT)
Age: 74
End: 2024-11-22

## 2024-12-27 ENCOUNTER — APPOINTMENT (OUTPATIENT)
Dept: ORTHOPEDIC SURGERY | Facility: CLINIC | Age: 74
End: 2024-12-27
Payer: OTHER MISCELLANEOUS

## 2024-12-27 DIAGNOSIS — Z96.651 PRESENCE OF RIGHT ARTIFICIAL KNEE JOINT: ICD-10-CM

## 2024-12-27 PROCEDURE — 99214 OFFICE O/P EST MOD 30 MIN: CPT

## 2024-12-27 PROCEDURE — 73562 X-RAY EXAM OF KNEE 3: CPT | Mod: RT

## 2024-12-27 RX ORDER — METHYLPREDNISOLONE 4 MG/1
4 TABLET ORAL
Qty: 1 | Refills: 0 | Status: ACTIVE | COMMUNITY
Start: 2024-12-27 | End: 1900-01-01

## 2025-01-20 ENCOUNTER — RX RENEWAL (OUTPATIENT)
Age: 75
End: 2025-01-20
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
## 2025-04-07 ENCOUNTER — APPOINTMENT (OUTPATIENT)
Dept: CT IMAGING | Facility: CLINIC | Age: 75
End: 2025-04-07
Payer: COMMERCIAL

## 2025-04-07 ENCOUNTER — OUTPATIENT (OUTPATIENT)
Dept: OUTPATIENT SERVICES | Facility: HOSPITAL | Age: 75
LOS: 1 days | End: 2025-04-07
Payer: COMMERCIAL

## 2025-04-07 DIAGNOSIS — Z96.651 PRESENCE OF RIGHT ARTIFICIAL KNEE JOINT: Chronic | ICD-10-CM

## 2025-04-07 DIAGNOSIS — R91.1 SOLITARY PULMONARY NODULE: ICD-10-CM

## 2025-04-07 DIAGNOSIS — Z98.890 OTHER SPECIFIED POSTPROCEDURAL STATES: Chronic | ICD-10-CM

## 2025-04-07 DIAGNOSIS — Z00.8 ENCOUNTER FOR OTHER GENERAL EXAMINATION: ICD-10-CM

## 2025-04-07 DIAGNOSIS — Z90.49 ACQUIRED ABSENCE OF OTHER SPECIFIED PARTS OF DIGESTIVE TRACT: Chronic | ICD-10-CM

## 2025-04-07 DIAGNOSIS — Z96.652 PRESENCE OF LEFT ARTIFICIAL KNEE JOINT: Chronic | ICD-10-CM

## 2025-04-07 DIAGNOSIS — Z98.89 OTHER SPECIFIED POSTPROCEDURAL STATES: Chronic | ICD-10-CM

## 2025-04-07 PROCEDURE — 71250 CT THORAX DX C-: CPT

## 2025-04-07 PROCEDURE — 71250 CT THORAX DX C-: CPT | Mod: 26

## 2025-04-11 ENCOUNTER — NON-APPOINTMENT (OUTPATIENT)
Age: 75
End: 2025-04-11

## 2025-04-14 ENCOUNTER — APPOINTMENT (OUTPATIENT)
Dept: THORACIC SURGERY | Facility: CLINIC | Age: 75
End: 2025-04-14

## 2025-04-16 ENCOUNTER — APPOINTMENT (OUTPATIENT)
Dept: THORACIC SURGERY | Facility: CLINIC | Age: 75
End: 2025-04-16
Payer: COMMERCIAL

## 2025-04-16 VITALS
HEART RATE: 67 BPM | SYSTOLIC BLOOD PRESSURE: 151 MMHG | DIASTOLIC BLOOD PRESSURE: 82 MMHG | OXYGEN SATURATION: 96 % | BODY MASS INDEX: 32.58 KG/M2 | WEIGHT: 220 LBS | HEIGHT: 69 IN

## 2025-04-16 DIAGNOSIS — R91.8 OTHER NONSPECIFIC ABNORMAL FINDING OF LUNG FIELD: ICD-10-CM

## 2025-04-16 PROCEDURE — 99214 OFFICE O/P EST MOD 30 MIN: CPT
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
## 2025-05-15 NOTE — DIETITIAN INITIAL EVALUATION ADULT. - OTHER INFO
69 years old male with PMH of Cholelithiasis / Choledocholithiasis s/p ERCP + Lap Cholecystectomy, Hepatitis B (in remission), Renal Calculi, HTN, HLD, Gout, Osteoarthritis, COVID-19 and Sleep Apnea on Nocturnal CPAP referred to [facility] yesterday by his PMD with liver lesion. Pt irritable during assessment- denied interview. No Ht/Wt documented in EMR, per past admission BMI 32.5. Pt with good po intake per documentation, consuming 100% of meals. Last documented BM 10/12. RD to follow up with nutrition education and obtain hx when feasible. 37.2

## 2025-05-26 ENCOUNTER — RX RENEWAL (OUTPATIENT)
Age: 75
End: 2025-05-26

## 2025-06-03 NOTE — PATIENT PROFILE ADULT - NSPROIMPLANTSMEDDEV_GEN_A_NUR
Initiate Treatment: Miconazorb AF 2 % topical powder \\nQuantity: 71.0 g  Days Supply: 30\\nSig: Apply to affected area under breast once daily In the morning.
Discontinue Regimen: Betamethasone lotion.
Render In Strict Bullet Format?: No
Detail Level: Zone
Modify Regimen: Use betamethasone lotion on body  as needed for itching.
Initiate Treatment: Imperial Cream from [facility] \\nHydroquinone 5%\\nKojic Acid 2%\\nVitamin C2.5%\\nHydrocortisone 1%\\nHyaluronic Acid 0.1%
None

## 2025-06-10 ENCOUNTER — NON-APPOINTMENT (OUTPATIENT)
Age: 75
End: 2025-06-10

## 2025-06-27 ENCOUNTER — APPOINTMENT (OUTPATIENT)
Dept: ORTHOPEDIC SURGERY | Facility: CLINIC | Age: 75
End: 2025-06-27
Payer: OTHER MISCELLANEOUS

## 2025-06-27 PROCEDURE — 99213 OFFICE O/P EST LOW 20 MIN: CPT

## 2025-06-27 PROCEDURE — 73562 X-RAY EXAM OF KNEE 3: CPT | Mod: RT

## 2025-08-20 ENCOUNTER — RX RENEWAL (OUTPATIENT)
Age: 75
End: 2025-08-20

## 2025-09-08 ENCOUNTER — APPOINTMENT (OUTPATIENT)
Dept: UROLOGY | Facility: CLINIC | Age: 75
End: 2025-09-08

## 2025-09-16 ENCOUNTER — APPOINTMENT (OUTPATIENT)
Dept: UROLOGY | Facility: CLINIC | Age: 75
End: 2025-09-16
Payer: COMMERCIAL

## 2025-09-16 VITALS
TEMPERATURE: 98 F | DIASTOLIC BLOOD PRESSURE: 71 MMHG | RESPIRATION RATE: 15 BRPM | WEIGHT: 230 LBS | SYSTOLIC BLOOD PRESSURE: 117 MMHG | OXYGEN SATURATION: 95 % | BODY MASS INDEX: 34.07 KG/M2 | HEIGHT: 69 IN | HEART RATE: 66 BPM

## 2025-09-16 DIAGNOSIS — N52.9 MALE ERECTILE DYSFUNCTION, UNSPECIFIED: ICD-10-CM

## 2025-09-16 DIAGNOSIS — N20.0 CALCULUS OF KIDNEY: ICD-10-CM

## 2025-09-16 PROCEDURE — 99213 OFFICE O/P EST LOW 20 MIN: CPT

## 2025-09-17 LAB
APPEARANCE: CLEAR
BACTERIA: NEGATIVE /HPF
BILIRUBIN URINE: NEGATIVE
BLOOD URINE: NEGATIVE
CAST: 1 /LPF
COLOR: NORMAL
EPITHELIAL CELLS: 2 /HPF
GLUCOSE QUALITATIVE U: NEGATIVE MG/DL
KETONES URINE: ABNORMAL MG/DL
LEUKOCYTE ESTERASE URINE: ABNORMAL
MICROSCOPIC-UA: NORMAL
NITRITE URINE: NEGATIVE
PH URINE: 7
PROTEIN URINE: NORMAL MG/DL
PSA SERPL-MCNC: 0.36 NG/ML
RED BLOOD CELLS URINE: 3 /HPF
SPECIFIC GRAVITY URINE: 1.02
TESTOST SERPL-MCNC: 514 NG/DL
UROBILINOGEN URINE: 1 MG/DL
WHITE BLOOD CELLS URINE: 1 /HPF